# Patient Record
Sex: FEMALE | Race: BLACK OR AFRICAN AMERICAN
[De-identification: names, ages, dates, MRNs, and addresses within clinical notes are randomized per-mention and may not be internally consistent; named-entity substitution may affect disease eponyms.]

---

## 2019-08-16 ENCOUNTER — HOSPITAL ENCOUNTER (OUTPATIENT)
Dept: HOSPITAL 74 - JER | Age: 69
Discharge: HOME | End: 2019-08-16
Payer: SELF-PAY

## 2023-08-12 ENCOUNTER — EMERGENCY (EMERGENCY)
Facility: HOSPITAL | Age: 73
LOS: 0 days | Discharge: HOME | End: 2023-08-12
Payer: MEDICAID

## 2023-08-12 VITALS
HEART RATE: 80 BPM | HEIGHT: 65 IN | OXYGEN SATURATION: 96 % | WEIGHT: 154.98 LBS | TEMPERATURE: 99 F | RESPIRATION RATE: 16 BRPM | SYSTOLIC BLOOD PRESSURE: 133 MMHG | DIASTOLIC BLOOD PRESSURE: 79 MMHG

## 2023-08-12 DIAGNOSIS — Z99.2 DEPENDENCE ON RENAL DIALYSIS: ICD-10-CM

## 2023-08-12 DIAGNOSIS — Z95.5 PRESENCE OF CORONARY ANGIOPLASTY IMPLANT AND GRAFT: Chronic | ICD-10-CM

## 2023-08-12 DIAGNOSIS — I12.0 HYPERTENSIVE CHRONIC KIDNEY DISEASE WITH STAGE 5 CHRONIC KIDNEY DISEASE OR END STAGE RENAL DISEASE: ICD-10-CM

## 2023-08-12 DIAGNOSIS — E11.22 TYPE 2 DIABETES MELLITUS WITH DIABETIC CHRONIC KIDNEY DISEASE: ICD-10-CM

## 2023-08-12 DIAGNOSIS — L97.919 NON-PRESSURE CHRONIC ULCER OF UNSPECIFIED PART OF RIGHT LOWER LEG WITH UNSPECIFIED SEVERITY: ICD-10-CM

## 2023-08-12 DIAGNOSIS — M79.661 PAIN IN RIGHT LOWER LEG: ICD-10-CM

## 2023-08-12 DIAGNOSIS — N18.6 END STAGE RENAL DISEASE: ICD-10-CM

## 2023-08-12 DIAGNOSIS — Z86.79 PERSONAL HISTORY OF OTHER DISEASES OF THE CIRCULATORY SYSTEM: ICD-10-CM

## 2023-08-12 DIAGNOSIS — Z95.5 PRESENCE OF CORONARY ANGIOPLASTY IMPLANT AND GRAFT: ICD-10-CM

## 2023-08-12 PROCEDURE — 73590 X-RAY EXAM OF LOWER LEG: CPT | Mod: 26,RT

## 2023-08-12 PROCEDURE — 99284 EMERGENCY DEPT VISIT MOD MDM: CPT

## 2023-08-12 RX ORDER — ACETAMINOPHEN 500 MG
2 TABLET ORAL
Qty: 32 | Refills: 0
Start: 2023-08-12 | End: 2023-08-15

## 2023-08-12 RX ORDER — CEPHALEXIN 500 MG
500 CAPSULE ORAL ONCE
Refills: 0 | Status: COMPLETED | OUTPATIENT
Start: 2023-08-12 | End: 2023-08-12

## 2023-08-12 RX ORDER — TRAMADOL HYDROCHLORIDE 50 MG/1
50 TABLET ORAL ONCE
Refills: 0 | Status: DISCONTINUED | OUTPATIENT
Start: 2023-08-12 | End: 2023-08-12

## 2023-08-12 RX ORDER — CEPHALEXIN 500 MG
1 CAPSULE ORAL
Qty: 7 | Refills: 0
Start: 2023-08-12 | End: 2023-08-18

## 2023-08-12 RX ADMIN — TRAMADOL HYDROCHLORIDE 50 MILLIGRAM(S): 50 TABLET ORAL at 13:44

## 2023-08-12 RX ADMIN — Medication 500 MILLIGRAM(S): at 15:43

## 2023-08-12 NOTE — ED PROVIDER NOTE - NSICDXPASTMEDICALHX_GEN_ALL_CORE_FT
PAST MEDICAL HISTORY:  CAD (coronary artery disease)     DM (diabetes mellitus)     ESRD on dialysis     Hypertension

## 2023-08-12 NOTE — ED PROVIDER NOTE - CLINICAL SUMMARY MEDICAL DECISION MAKING FREE TEXT BOX
74 y/o female with ESRD MWF, dm, htn cad with stent here with right leg injury sustained 1 week ago with superficial ulcer.   Will obtain xray r/o fracture. Pt up to date on tetanus. NO signs of cellulitis Vs stable.   I have reviewed the xray and no fracture or dislocation.   Will cover with keflex.  Rx keflex sent to pharmacy.   Advised to keep wound clean and dry and follow up with PMD/ wound care. 72 y/o female with ESRD MWF, dm, htn cad with stent here with right leg injury sustained 1 week ago with superficial ulcer.   Will obtain xray r/o fracture. Pt up to date on tetanus. NO signs of cellulitis Vs stable.   I have reviewed the xray and no fracture or dislocation. Superficial ulcer to the right shin dressed with gauze and bacitracin.   Will cover with keflex.  Rx keflex sent to pharmacy.   Advised to keep wound clean and dry and follow up with PMD/ wound care.

## 2023-08-12 NOTE — ED ADULT TRIAGE NOTE - CHIEF COMPLAINT QUOTE
BIBEMS c/o right chin pain and getting worse started last week when hit chin on bed frame. hx dm   sore to right chin

## 2023-08-12 NOTE — ED PROVIDER NOTE - OBJECTIVE STATEMENT
74 y/o female with ESRD MWF, DM, htn, cad with stent here with right lower pain s/p injury. Pt states she bumped her right lower leg last week and has been having pain to the area. Denies fever, bleeding. Pt states she normally ambulates with a  walker. Denies numbness, tingling. Last dialysis yesterday. Pt took tylenol with no relief. Pt concerned for possible infection

## 2023-08-12 NOTE — ED PROVIDER NOTE - PATIENT PORTAL LINK FT
You can access the FollowMyHealth Patient Portal offered by [facility] by registering at the following website: [email or web address]. By joining [employer]’s FollowMyHealth portal, you will also be able to view your health information using other applications (apps) compatible with our system.

## 2023-08-12 NOTE — ED ADULT NURSE NOTE - OBJECTIVE STATEMENT
Patient is alert and oriented x4. Came in for wound on right shin. As per daughter, patient hit right shin 1 week ago. Patient is alert and oriented x4. Came in for wound on right shin. As per daughter, patient hit right shin 1 week ago on a bed. Denies any falls. Approximately 1 cm wound noted on right shin. No bleeding noted. Daughter reports patient has been having difficulty ambulating since then. PMH DM.

## 2023-08-12 NOTE — ED PROVIDER NOTE - NSFOLLOWUPINSTRUCTIONS_ED_ALL_ED_FT
Rest, drink plenty of fluids.  Advance activity as tolerated.  Continue all previously prescribed medications as directed.  Follow up with your primary care physician in 48-72 hours- bring copies of your results.  Return to the ER for worsening or persistent symptoms, and/or ANY NEW OR CONCERNING SYMPTOMS. If you have issues obtaining follow up, please call: [phone] to obtain a doctor or specialist who takes your insurance in your area.  You may call [phone] to make an appointment with the internal medicine clinic.

## 2023-08-12 NOTE — ED PROVIDER NOTE - PHYSICAL EXAMINATION
GEN: Awake, alert, interactive, NAD.  HEAD AND NECK: NC/AT. Airway patent. Neck supple.   EYES:  Clear b/l.   ENT: Moist mucus membranes.   CARDIAC: Regular rate, regular rhythm. No evident pedal edema.    RESP/CHEST: Normal respiratory effort with no use of accessory muscles or retractions. Clear throughout on auscultation.  ABD: soft, non-distended, non-tender. No rebound, no guarding.   BACK: No midline spinal TTP. No CVAT.   EXTREMITIES: RLE: (+) 1.5cm circular superficial ulcer to the right lower shin, (-) edema, (-) erythema, (-) drainage, (+) FROM of the knee, ankle and toes,  (+) pulses intact by doppler  SKIN: Warm, dry, intact normal color. No rash.   NEURO: AOx3, no focal deficits.   PSYCH: Appropriate mood and affect.

## 2023-08-12 NOTE — ED ADULT NURSE NOTE - NSFALLHARMRISKINTERV_ED_ALL_ED

## 2023-08-12 NOTE — ED PROVIDER NOTE - NSFOLLOWUPCLINICS_GEN_ALL_ED_FT
[facility]  Wound Care  [address] [place] [address]  Phone: [phone]  Fax: [phone]  Follow Up Time: 4-6 Days

## 2023-08-20 ENCOUNTER — INPATIENT (INPATIENT)
Facility: HOSPITAL | Age: 73
LOS: 4 days | Discharge: HOME | End: 2023-08-25
Attending: INTERNAL MEDICINE | Admitting: INTERNAL MEDICINE
Payer: MEDICAID

## 2023-08-20 VITALS
DIASTOLIC BLOOD PRESSURE: 70 MMHG | HEART RATE: 89 BPM | OXYGEN SATURATION: 98 % | SYSTOLIC BLOOD PRESSURE: 174 MMHG | HEIGHT: 65 IN | WEIGHT: 164.91 LBS

## 2023-08-20 DIAGNOSIS — Z95.5 PRESENCE OF CORONARY ANGIOPLASTY IMPLANT AND GRAFT: Chronic | ICD-10-CM

## 2023-08-20 LAB
ALBUMIN SERPL ELPH-MCNC: 3 G/DL — LOW (ref 3.3–5)
ALP SERPL-CCNC: 176 U/L — HIGH (ref 40–120)
ALT FLD-CCNC: 58 U/L — SIGNIFICANT CHANGE UP (ref 12–78)
ANION GAP SERPL CALC-SCNC: 12 MMOL/L — SIGNIFICANT CHANGE UP (ref 5–17)
AST SERPL-CCNC: 53 U/L — HIGH (ref 15–37)
BASOPHILS # BLD AUTO: 0.04 K/UL — SIGNIFICANT CHANGE UP (ref 0–0.2)
BASOPHILS NFR BLD AUTO: 0.7 % — SIGNIFICANT CHANGE UP (ref 0–2)
BILIRUB SERPL-MCNC: 0.3 MG/DL — SIGNIFICANT CHANGE UP (ref 0.2–1.2)
BUN SERPL-MCNC: 57 MG/DL — HIGH (ref 7–23)
CALCIUM SERPL-MCNC: 8.5 MG/DL — SIGNIFICANT CHANGE UP (ref 8.5–10.1)
CHLORIDE SERPL-SCNC: 101 MMOL/L — SIGNIFICANT CHANGE UP (ref 96–108)
CO2 SERPL-SCNC: 25 MMOL/L — SIGNIFICANT CHANGE UP (ref 22–31)
CREAT SERPL-MCNC: 11.6 MG/DL — HIGH (ref 0.5–1.3)
EGFR: 3 ML/MIN/1.73M2 — LOW
EOSINOPHIL # BLD AUTO: 0.08 K/UL — SIGNIFICANT CHANGE UP (ref 0–0.5)
EOSINOPHIL NFR BLD AUTO: 1.4 % — SIGNIFICANT CHANGE UP (ref 0–6)
GLUCOSE SERPL-MCNC: 135 MG/DL — HIGH (ref 70–99)
HCT VFR BLD CALC: 32.1 % — LOW (ref 34.5–45)
HGB BLD-MCNC: 9.8 G/DL — LOW (ref 11.5–15.5)
IMM GRANULOCYTES NFR BLD AUTO: 0.5 % — SIGNIFICANT CHANGE UP (ref 0–0.9)
LIDOCAIN IGE QN: 90 U/L — SIGNIFICANT CHANGE UP (ref 73–393)
LYMPHOCYTES # BLD AUTO: 1.16 K/UL — SIGNIFICANT CHANGE UP (ref 1–3.3)
LYMPHOCYTES # BLD AUTO: 19.7 % — SIGNIFICANT CHANGE UP (ref 13–44)
MCHC RBC-ENTMCNC: 28.8 PG — SIGNIFICANT CHANGE UP (ref 27–34)
MCHC RBC-ENTMCNC: 30.5 G/DL — LOW (ref 32–36)
MCV RBC AUTO: 94.4 FL — SIGNIFICANT CHANGE UP (ref 80–100)
MONOCYTES # BLD AUTO: 0.41 K/UL — SIGNIFICANT CHANGE UP (ref 0–0.9)
MONOCYTES NFR BLD AUTO: 7 % — SIGNIFICANT CHANGE UP (ref 2–14)
NEUTROPHILS # BLD AUTO: 4.16 K/UL — SIGNIFICANT CHANGE UP (ref 1.8–7.4)
NEUTROPHILS NFR BLD AUTO: 70.7 % — SIGNIFICANT CHANGE UP (ref 43–77)
NRBC # BLD: 0 /100 WBCS — SIGNIFICANT CHANGE UP (ref 0–0)
PLATELET # BLD AUTO: 217 K/UL — SIGNIFICANT CHANGE UP (ref 150–400)
POTASSIUM SERPL-MCNC: 4.3 MMOL/L — SIGNIFICANT CHANGE UP (ref 3.5–5.3)
POTASSIUM SERPL-SCNC: 4.3 MMOL/L — SIGNIFICANT CHANGE UP (ref 3.5–5.3)
PROT SERPL-MCNC: 6.8 GM/DL — SIGNIFICANT CHANGE UP (ref 6–8.3)
RBC # BLD: 3.4 M/UL — LOW (ref 3.8–5.2)
RBC # FLD: 13.6 % — SIGNIFICANT CHANGE UP (ref 10.3–14.5)
SODIUM SERPL-SCNC: 138 MMOL/L — SIGNIFICANT CHANGE UP (ref 135–145)
TROPONIN I, HIGH SENSITIVITY RESULT: 201.8 NG/L — HIGH
WBC # BLD: 5.88 K/UL — SIGNIFICANT CHANGE UP (ref 3.8–10.5)
WBC # FLD AUTO: 5.88 K/UL — SIGNIFICANT CHANGE UP (ref 3.8–10.5)

## 2023-08-20 PROCEDURE — 74177 CT ABD & PELVIS W/CONTRAST: CPT | Mod: 26,MA

## 2023-08-20 PROCEDURE — 99285 EMERGENCY DEPT VISIT HI MDM: CPT

## 2023-08-20 RX ORDER — FAMOTIDINE 10 MG/ML
20 INJECTION INTRAVENOUS ONCE
Refills: 0 | Status: COMPLETED | OUTPATIENT
Start: 2023-08-20 | End: 2023-08-20

## 2023-08-20 RX ORDER — ONDANSETRON 8 MG/1
4 TABLET, FILM COATED ORAL ONCE
Refills: 0 | Status: COMPLETED | OUTPATIENT
Start: 2023-08-20 | End: 2023-08-20

## 2023-08-20 RX ORDER — HYDROMORPHONE HYDROCHLORIDE 2 MG/ML
0.2 INJECTION INTRAMUSCULAR; INTRAVENOUS; SUBCUTANEOUS ONCE
Refills: 0 | Status: DISCONTINUED | OUTPATIENT
Start: 2023-08-20 | End: 2023-08-20

## 2023-08-20 RX ADMIN — ONDANSETRON 4 MILLIGRAM(S): 8 TABLET, FILM COATED ORAL at 21:01

## 2023-08-20 RX ADMIN — HYDROMORPHONE HYDROCHLORIDE 0.2 MILLIGRAM(S): 2 INJECTION INTRAMUSCULAR; INTRAVENOUS; SUBCUTANEOUS at 21:28

## 2023-08-20 RX ADMIN — FAMOTIDINE 20 MILLIGRAM(S): 10 INJECTION INTRAVENOUS at 21:01

## 2023-08-20 NOTE — ED ADULT NURSE NOTE - OBJECTIVE STATEMENT
pt a&O x3 fistula l arm  dialys m/w/f. walks with faulkner. pt c/o abdominal pain, nausea and vomiting for 3 days. pt states she was seen at [facility] on Tuesday for similar symptoms told fibroids and to consult surgery here at [facility]. history of htn, dm and ESRD on dialysis.

## 2023-08-20 NOTE — ED ADULT NURSE NOTE - CHIEF COMPLAINT QUOTE
pt c/o abdominal pain, nausea and vomiting for 3 days. pt states she was seen at [facility] on Tuesday for similar symptoms. history of htn, dm and ESRD on dialysis.

## 2023-08-20 NOTE — ED ADULT TRIAGE NOTE - CHIEF COMPLAINT QUOTE
pt c/o abdominal pain, nausea and vomiting for 3 days. pt states she was seen at [facility] on Tuesday for similar symptoms. history of htn, dm and ESRD on dialysis.

## 2023-08-20 NOTE — ED ADULT NURSE NOTE - NSFALLHARMRISKINTERV_ED_ALL_ED

## 2023-08-21 DIAGNOSIS — N18.6 END STAGE RENAL DISEASE: ICD-10-CM

## 2023-08-21 DIAGNOSIS — R10.9 UNSPECIFIED ABDOMINAL PAIN: ICD-10-CM

## 2023-08-21 DIAGNOSIS — D64.9 ANEMIA, UNSPECIFIED: ICD-10-CM

## 2023-08-21 DIAGNOSIS — R77.8 OTHER SPECIFIED ABNORMALITIES OF PLASMA PROTEINS: ICD-10-CM

## 2023-08-21 PROBLEM — I10 ESSENTIAL (PRIMARY) HYPERTENSION: Chronic | Status: ACTIVE | Noted: 2023-08-12

## 2023-08-21 LAB
GLUCOSE BLDC GLUCOMTR-MCNC: 119 MG/DL — HIGH (ref 70–99)
GLUCOSE BLDC GLUCOMTR-MCNC: 186 MG/DL — HIGH (ref 70–99)
GLUCOSE BLDC GLUCOMTR-MCNC: 197 MG/DL — HIGH (ref 70–99)
TROPONIN I, HIGH SENSITIVITY RESULT: 217.7 NG/L — HIGH

## 2023-08-21 PROCEDURE — 99223 1ST HOSP IP/OBS HIGH 75: CPT

## 2023-08-21 RX ORDER — DEXTROSE 50 % IN WATER 50 %
12.5 SYRINGE (ML) INTRAVENOUS ONCE
Refills: 0 | Status: DISCONTINUED | OUTPATIENT
Start: 2023-08-21 | End: 2023-08-25

## 2023-08-21 RX ORDER — DEXTROSE 50 % IN WATER 50 %
25 SYRINGE (ML) INTRAVENOUS ONCE
Refills: 0 | Status: DISCONTINUED | OUTPATIENT
Start: 2023-08-21 | End: 2023-08-25

## 2023-08-21 RX ORDER — AMLODIPINE BESYLATE 2.5 MG/1
10 TABLET ORAL DAILY
Refills: 0 | Status: DISCONTINUED | OUTPATIENT
Start: 2023-08-21 | End: 2023-08-25

## 2023-08-21 RX ORDER — PIPERACILLIN AND TAZOBACTAM 4; .5 G/20ML; G/20ML
3.38 INJECTION, POWDER, LYOPHILIZED, FOR SOLUTION INTRAVENOUS EVERY 12 HOURS
Refills: 0 | Status: DISCONTINUED | OUTPATIENT
Start: 2023-08-21 | End: 2023-08-25

## 2023-08-21 RX ORDER — CIPROFLOXACIN LACTATE 400MG/40ML
VIAL (ML) INTRAVENOUS
Refills: 0 | Status: DISCONTINUED | OUTPATIENT
Start: 2023-08-21 | End: 2023-08-21

## 2023-08-21 RX ORDER — HYDRALAZINE HCL 50 MG
50 TABLET ORAL EVERY 12 HOURS
Refills: 0 | Status: DISCONTINUED | OUTPATIENT
Start: 2023-08-21 | End: 2023-08-25

## 2023-08-21 RX ORDER — PANTOPRAZOLE SODIUM 20 MG/1
40 TABLET, DELAYED RELEASE ORAL
Refills: 0 | Status: DISCONTINUED | OUTPATIENT
Start: 2023-08-21 | End: 2023-08-25

## 2023-08-21 RX ORDER — ACETAMINOPHEN 500 MG
650 TABLET ORAL EVERY 6 HOURS
Refills: 0 | Status: DISCONTINUED | OUTPATIENT
Start: 2023-08-21 | End: 2023-08-25

## 2023-08-21 RX ORDER — DEXTROSE 50 % IN WATER 50 %
15 SYRINGE (ML) INTRAVENOUS ONCE
Refills: 0 | Status: DISCONTINUED | OUTPATIENT
Start: 2023-08-21 | End: 2023-08-25

## 2023-08-21 RX ORDER — SODIUM CHLORIDE 9 MG/ML
1000 INJECTION, SOLUTION INTRAVENOUS
Refills: 0 | Status: DISCONTINUED | OUTPATIENT
Start: 2023-08-21 | End: 2023-08-25

## 2023-08-21 RX ORDER — CIPROFLOXACIN LACTATE 400MG/40ML
400 VIAL (ML) INTRAVENOUS ONCE
Refills: 0 | Status: COMPLETED | OUTPATIENT
Start: 2023-08-21 | End: 2023-08-21

## 2023-08-21 RX ORDER — HYDROMORPHONE HYDROCHLORIDE 2 MG/ML
0.2 INJECTION INTRAMUSCULAR; INTRAVENOUS; SUBCUTANEOUS ONCE
Refills: 0 | Status: DISCONTINUED | OUTPATIENT
Start: 2023-08-21 | End: 2023-08-21

## 2023-08-21 RX ORDER — ONDANSETRON 8 MG/1
4 TABLET, FILM COATED ORAL EVERY 8 HOURS
Refills: 0 | Status: DISCONTINUED | OUTPATIENT
Start: 2023-08-21 | End: 2023-08-25

## 2023-08-21 RX ORDER — INSULIN LISPRO 100/ML
VIAL (ML) SUBCUTANEOUS
Refills: 0 | Status: DISCONTINUED | OUTPATIENT
Start: 2023-08-21 | End: 2023-08-25

## 2023-08-21 RX ORDER — ASPIRIN/CALCIUM CARB/MAGNESIUM 324 MG
81 TABLET ORAL DAILY
Refills: 0 | Status: DISCONTINUED | OUTPATIENT
Start: 2023-08-21 | End: 2023-08-25

## 2023-08-21 RX ORDER — METRONIDAZOLE 500 MG
500 TABLET ORAL ONCE
Refills: 0 | Status: COMPLETED | OUTPATIENT
Start: 2023-08-21 | End: 2023-08-21

## 2023-08-21 RX ORDER — GLUCAGON INJECTION, SOLUTION 0.5 MG/.1ML
1 INJECTION, SOLUTION SUBCUTANEOUS ONCE
Refills: 0 | Status: DISCONTINUED | OUTPATIENT
Start: 2023-08-21 | End: 2023-08-25

## 2023-08-21 RX ORDER — LANOLIN ALCOHOL/MO/W.PET/CERES
3 CREAM (GRAM) TOPICAL AT BEDTIME
Refills: 0 | Status: DISCONTINUED | OUTPATIENT
Start: 2023-08-21 | End: 2023-08-25

## 2023-08-21 RX ORDER — INSULIN LISPRO 100/ML
VIAL (ML) SUBCUTANEOUS AT BEDTIME
Refills: 0 | Status: DISCONTINUED | OUTPATIENT
Start: 2023-08-21 | End: 2023-08-25

## 2023-08-21 RX ADMIN — Medication 50 MILLIGRAM(S): at 05:19

## 2023-08-21 RX ADMIN — Medication 650 MILLIGRAM(S): at 05:08

## 2023-08-21 RX ADMIN — HYDROMORPHONE HYDROCHLORIDE 0.2 MILLIGRAM(S): 2 INJECTION INTRAMUSCULAR; INTRAVENOUS; SUBCUTANEOUS at 01:18

## 2023-08-21 RX ADMIN — Medication 200 MILLIGRAM(S): at 01:36

## 2023-08-21 RX ADMIN — PIPERACILLIN AND TAZOBACTAM 25 GRAM(S): 4; .5 INJECTION, POWDER, LYOPHILIZED, FOR SOLUTION INTRAVENOUS at 18:32

## 2023-08-21 RX ADMIN — AMLODIPINE BESYLATE 10 MILLIGRAM(S): 2.5 TABLET ORAL at 05:20

## 2023-08-21 RX ADMIN — Medication 1: at 11:15

## 2023-08-21 RX ADMIN — Medication 650 MILLIGRAM(S): at 11:14

## 2023-08-21 RX ADMIN — PIPERACILLIN AND TAZOBACTAM 25 GRAM(S): 4; .5 INJECTION, POWDER, LYOPHILIZED, FOR SOLUTION INTRAVENOUS at 05:08

## 2023-08-21 RX ADMIN — Medication 81 MILLIGRAM(S): at 11:14

## 2023-08-21 RX ADMIN — Medication 500 MILLIGRAM(S): at 01:39

## 2023-08-21 RX ADMIN — Medication 650 MILLIGRAM(S): at 17:23

## 2023-08-21 RX ADMIN — HYDROMORPHONE HYDROCHLORIDE 0.2 MILLIGRAM(S): 2 INJECTION INTRAMUSCULAR; INTRAVENOUS; SUBCUTANEOUS at 00:48

## 2023-08-21 RX ADMIN — ONDANSETRON 4 MILLIGRAM(S): 8 TABLET, FILM COATED ORAL at 03:05

## 2023-08-21 RX ADMIN — Medication 50 MILLIGRAM(S): at 18:31

## 2023-08-21 RX ADMIN — PANTOPRAZOLE SODIUM 40 MILLIGRAM(S): 20 TABLET, DELAYED RELEASE ORAL at 05:26

## 2023-08-21 RX ADMIN — Medication 100 MILLIGRAM(S): at 00:39

## 2023-08-21 NOTE — PATIENT PROFILE ADULT - FUNCTIONAL ASSESSMENT - BASIC MOBILITY 6.
Class II - visualization of the soft palate, fauces, and uvula
 3-calculated by average/Not able to assess (calculate score using [facility] averaging method)

## 2023-08-21 NOTE — H&P ADULT - PROBLEM SELECTOR PLAN 1
1 week history of abdominal pain   Recently discharged from [facility] for the same symptom   CT- Abd: Edematous wall thickening of the large bowel, most consistent with colitis  - IVF   - Zosyn   - Pain management

## 2023-08-21 NOTE — H&P ADULT - HISTORY OF PRESENT ILLNESS
73 year old female with a PMH of ESRD on dialysis (MWF), HTN, DM  presents to the ED with 1 week history of abdominal pain, patient went to [facility] for the same symptoms and was discharged 3 days ago. Endorses she is unclear of what her diagnosis was, she was prescribed antibiotics, but the pain persisted associated with nausea and NBNB vomiting.  Denies diarrhea, constipation, prior abd surgery, fever, SOB, chest pain, dysuria or any other symptoms. CT- Abd: Edematous wall thickening of the large bowel, most consistent with colitis.

## 2023-08-21 NOTE — CONSULT NOTE ADULT - CONSULT REQUESTED DATE/TIME
In an effort to ensure that our patients [employer], a clinical Team Member has reviewed your chart and identified an opportunity to provide the best care possible. An Outreach Attempt was made to discuss or schedule overdue Preventative or Disease Management screening.     The Outcome of the Outreach was Contact was not made, letter/portal message sent. Care Gaps include Immunizations and Wellness Visits.      
21-Aug-2023 11:27

## 2023-08-21 NOTE — ED PROVIDER NOTE - PROGRESS NOTE DETAILS
Colitis on CT, will treat with IV antibiotics as patient has had to hobble tolerating p.o. at home, also required 2 rounds of Dilaudid for pain control.  Troponin elevated, however no prior troponin likely from ESRD.  Patient will receive dialysis tomorrow as she is scheduled and received contrast today

## 2023-08-21 NOTE — ED PROVIDER NOTE - PHYSICAL EXAMINATION
General: No acute distress, mentation at baseline,  well nourished, well developed  HEENT: NCAT, Neck supple without meningismus, PERRL, no conjunctival injection  Lungs: CTAB, No wheeze or crackles, No retractions, No increased work of breathing  Heart: S1S2 RRR, No M/R/G, Pules equal Bilaterally in upper and lower extremities distally  Abd: soft, lower abd tenderness, ND, No guarding, No rebound.  No hernias, no palpable masses.  Extrem: FROM in all joints, no gross deformities appreciated, no significant edema noted, No ulcers. Cap refil < 2sec.  Skin: No rash noted, warm dry.  Neuro:  Grossly normal.  No difficulty ambulating. No focal deficits.  Psychiatric: Appropriate mood and affect.

## 2023-08-21 NOTE — H&P ADULT - NSHPPHYSICALEXAM_GEN_ALL_CORE
General: No acute distress  HEENT: NCAT, Neck supple without meningismus, PERRL, no conjunctival injection  Lungs: CTAB, No wheeze or crackles, No retractions, No increased work of breathing  Heart: = RRR, No M/R/G, Pules equal Bilaterally in upper and lower extremities distally  Abd: +BS, soft, lower abd tenderness, No guarding, No rebound.  No hernias, no palpable masses.  EXT: Full ROM. no gross deformities appreciated, no significant edema noted  Skin: No rash noted, warm dry.  Neuro:  Grossly normal.  No difficulty ambulating. No focal deficits.  Psychiatric: Appropriate mood and affect.

## 2023-08-21 NOTE — CONSULT NOTE ADULT - SUBJECTIVE AND OBJECTIVE BOX
[facility] NEPHROLOGY SERVICES, [facility]  NEPHROLOGY AND HYPERTENSION  [address]  [address]  [place] [address]  [phone]    MD GINA GIBSON MD YELENA ROSENBERG, MD BINNY KOSHY, MD CHRISTOPHER CAPUTO, MD EDWARD BOVER, MD      Information from chart:  "Patient is a 73y old  Female who presents with a chief complaint of Abdominal Pain (21 Aug 2023 02:55)    HPI:  73 year old female with a PMH of ESRD on dialysis (MWF), HTN, DM  presents to the ED with 1 week history of abdominal pain, patient went to [facility] for the same symptoms and was discharged 3 days ago. Endorses she is unclear of what her diagnosis was, she was prescribed antibiotics, but the pain persisted associated with nausea and NBNB vomiting.  Denies diarrhea, constipation, prior abd surgery, fever, SOB, chest pain, dysuria or any other symptoms. CT- Abd: Edematous wall thickening of the large bowel, most consistent with colitis.   (21 Aug 2023 02:55)   "  No distress    HD at [facility]  Dr. Smith    PAST MEDICAL & SURGICAL HISTORY:  ESRD on dialysis      DM (diabetes mellitus)      Hypertension      CAD (coronary artery disease)      H/O heart artery stent        FAMILY HISTORY:  No pertinent family history in first degree relatives      Allergies    No Known Allergies    Intolerances      Home Medications:    MEDICATIONS  (STANDING):  amLODIPine   Tablet 10 milliGRAM(s) Oral daily  aspirin  chewable 81 milliGRAM(s) Oral daily  dextrose 5%. 1000 milliLiter(s) (50 mL/Hr) IV Continuous <Continuous>  dextrose 5%. 1000 milliLiter(s) (100 mL/Hr) IV Continuous <Continuous>  dextrose 50% Injectable 12.5 Gram(s) IV Push once  dextrose 50% Injectable 25 Gram(s) IV Push once  dextrose 50% Injectable 25 Gram(s) IV Push once  glucagon  Injectable 1 milliGRAM(s) IntraMuscular once  hydrALAZINE 50 milliGRAM(s) Oral every 12 hours  insulin lispro (ADMELOG) corrective regimen sliding scale   SubCutaneous at bedtime  insulin lispro (ADMELOG) corrective regimen sliding scale   SubCutaneous three times a day before meals  pantoprazole    Tablet 40 milliGRAM(s) Oral before breakfast  piperacillin/tazobactam IVPB.. 3.375 Gram(s) IV Intermittent every 12 hours    MEDICATIONS  (PRN):  acetaminophen     Tablet .. 650 milliGRAM(s) Oral every 6 hours PRN Temp greater or equal to 38C (100.4F), Mild Pain (1 - 3)  aluminum hydroxide/magnesium hydroxide/simethicone Suspension 30 milliLiter(s) Oral every 4 hours PRN Dyspepsia  dextrose Oral Gel 15 Gram(s) Oral once PRN Blood Glucose LESS THAN 70 milliGRAM(s)/deciliter  melatonin 3 milliGRAM(s) Oral at bedtime PRN Insomnia  ondansetron Injectable 4 milliGRAM(s) IV Push every 8 hours PRN Nausea and/or Vomiting    Vital Signs Last 24 Hrs  T(C): 36.7 (21 Aug 2023 08:27), Max: 36.8 (21 Aug 2023 04:13)  T(F): 98 (21 Aug 2023 08:27), Max: 98.2 (21 Aug 2023 04:13)  HR: 79 (21 Aug 2023 08:27) (77 - 102)  BP: 187/78 (21 Aug 2023 08:27) (161/64 - 195/76)  BP(mean): --  RR: 18 (21 Aug 2023 08:27) (18 - 18)  SpO2: 98% (21 Aug 2023 08:27) (96% - 100%)    Parameters below as of 21 Aug 2023 08:27  Patient On (Oxygen Delivery Method): room air        Daily Height in cm: 165.1 (20 Aug 2023 19:05)    Daily     CAPILLARY BLOOD GLUCOSE      POCT Blood Glucose.: 186 mg/dL (21 Aug 2023 11:01)  POCT Blood Glucose.: 119 mg/dL (21 Aug 2023 04:36)    PHYSICAL EXAM:      T(C): 36.7 (08-21-23 @ 08:27), Max: 36.8 (08-21-23 @ 04:13)  HR: 79 (08-21-23 @ 08:27) (77 - 102)  BP: 187/78 (08-21-23 @ 08:27) (161/64 - 195/76)  RR: 18 (08-21-23 @ 08:27) (18 - 18)  SpO2: 98% (08-21-23 @ 08:27) (96% - 100%)  Wt(kg): --  Lungs clear  Heart S1S2  Abd soft NT ND  Extremities:   tr edema              08-20    138  |  101  |  57<H>  ----------------------------<  135<H>  4.3   |  25  |  11.60<H>    Ca    8.5      20 Aug 2023 20:50    TPro  6.8  /  Alb  3.0<L>  /  TBili  0.3  /  DBili  x   /  AST  53<H>  /  ALT  58  /  AlkPhos  176<H>  08-20                          9.8    5.88  )-----------( 217      ( 20 Aug 2023 20:50 )             32.1     Creatinine Trend: 11.60<--  Urinalysis Basic - ( 20 Aug 2023 20:50 )    Color: x / Appearance: x / SG: x / pH: x  Gluc: 135 mg/dL / Ketone: x  / Bili: x / Urobili: x   Blood: x / Protein: x / Nitrite: x   Leuk Esterase: x / RBC: x / WBC x   Sq Epi: x / Non Sq Epi: x / Bacteria: x            Assessment   ESRD; colitis;   Incidental adrenal nodule; HTN    Plan  Maintenance HD for today  Jaylen: renin level    Nicko Jean MD

## 2023-08-21 NOTE — PATIENT PROFILE ADULT - FALL HARM RISK - RISK INTERVENTIONS

## 2023-08-21 NOTE — H&P ADULT - NSHPLABSRESULTS_GEN_ALL_CORE
9.8    5.88  )-----------( 217      ( 20 Aug 2023 20:50 )             32.1     08-20    138  |  101  |  57<H>  ----------------------------<  135<H>  4.3   |  25  |  11.60<H>    Ca    8.5      20 Aug 2023 20:50    TPro  6.8  /  Alb  3.0<L>  /  TBili  0.3  /  DBili  x   /  AST  53<H>  /  ALT  58  /  AlkPhos  176<H>  08-20      Urinalysis Basic - ( 20 Aug 2023 20:50 )    Color: x / Appearance: x / SG: x / pH: x  Gluc: 135 mg/dL / Ketone: x  / Bili: x / Urobili: x   Blood: x / Protein: x / Nitrite: x   Leuk Esterase: x / RBC: x / WBC x   Sq Epi: x / Non Sq Epi: x / Bacteria: x    CT-Abd  Edematous wall thickening of the large bowel, most consistent with   colitis.    Cholelithiasis and/or gallbladder sludge.    1.4 cm right adrenal gland nodule cannot be adequately characterized on   this exam.    6 mm right lower lobe pulmonary nodule. Follow-up CT chest in 6-12 months   can be obtained.

## 2023-08-21 NOTE — ED PROVIDER NOTE - CLINICAL SUMMARY MEDICAL DECISION MAKING FREE TEXT BOX
Differential diagnosis includes: colitis vs appy vs SBO. Abdominal exam without peritoneal signs. No evidence of acute abdomen at this time. Well appearing. Low suspicion for acute hepatobiliary disease (includng acute cholecystitis), acute pancreatitis, PUD (including perforation), acute infectious processes (pneumonia, hepatitis, pyelonephritis), vascular catastrophe, or viscus perforation. Presentation not consistent with other acute, emergent causes of abdominal pain at this time.    Plan: labs, UA, CT AP, pain control, serial reassessment

## 2023-08-21 NOTE — H&P ADULT - ASSESSMENT
73 year old female with a PMH of ESRD on dialysis (MWF), HTN, DM  presents to the ED with 1 week history of abdominal pain, patient went to [facility] for the same symptoms and was discharged 3 days ago. Endorses she is unclear of what her diagnosis was, she was prescribed antibiotics, but the pain persisted associated with nausea and NBNB vomiting.  Denies diarrhea, constipation, prior abd surgery, fever, SOB, chest pain, dysuria or any other symptoms. CT- Abd: Edematous wall thickening of the large bowel, most consistent with colitis.

## 2023-08-21 NOTE — ED PROVIDER NOTE - NS ED ROS FT
CONST: no fevers, no chills  EYES: no pain, no vision changes  ENT: no sore throat, no ear pain, no change in hearing  CV: no chest pain, no leg swelling  RESP: no shortness of breath, no cough  ABD: (+) abdominal pain, nausea, vomiting  : no dysuria, no flank pain, no hematuria  MSK: no back pain, no extremity pain  NEURO: no headache or additional neurologic complaints  HEME: no easy bleeding  SKIN:  no rash

## 2023-08-21 NOTE — ED PROVIDER NOTE - OBJECTIVE STATEMENT
73-year-old female past ministry of ESRD dialysis Monday Wednesday Friday, hypertension diabetes presenting with abdominal pain for 1 week.  Patient states she was discharged from [facility] 3 days ago for same complaint.  Does not remember diagnosis at that time.  States that she has had persistent pain despite outpatient medications, has vomited medication few times.  Denies diarrhea constipation or prior abdominal surgery.  Denies fever vaginal bleeding discharge or dysuria or hematuria

## 2023-08-22 LAB
A1C WITH ESTIMATED AVERAGE GLUCOSE RESULT: 7.8 % — HIGH (ref 4–5.6)
ALBUMIN SERPL ELPH-MCNC: 2.6 G/DL — LOW (ref 3.3–5)
ALDOST SERPL-MCNC: 30.7 NG/DL — HIGH
ALDOSTERONE / DIRECT RENIN RATIO RESULT: 6 RATIO — HIGH
ALP SERPL-CCNC: 156 U/L — HIGH (ref 40–120)
ALT FLD-CCNC: 68 U/L — SIGNIFICANT CHANGE UP (ref 12–78)
ANION GAP SERPL CALC-SCNC: 6 MMOL/L — SIGNIFICANT CHANGE UP (ref 5–17)
AST SERPL-CCNC: 47 U/L — HIGH (ref 15–37)
BILIRUB SERPL-MCNC: 0.2 MG/DL — SIGNIFICANT CHANGE UP (ref 0.2–1.2)
BUN SERPL-MCNC: 30 MG/DL — HIGH (ref 7–23)
CALCIUM SERPL-MCNC: 8.6 MG/DL — SIGNIFICANT CHANGE UP (ref 8.5–10.1)
CHLORIDE SERPL-SCNC: 104 MMOL/L — SIGNIFICANT CHANGE UP (ref 96–108)
CHOLEST SERPL-MCNC: 141 MG/DL — SIGNIFICANT CHANGE UP
CO2 SERPL-SCNC: 27 MMOL/L — SIGNIFICANT CHANGE UP (ref 22–31)
CREAT SERPL-MCNC: 7.7 MG/DL — HIGH (ref 0.5–1.3)
EGFR: 5 ML/MIN/1.73M2 — LOW
ESTIMATED AVERAGE GLUCOSE: 177 MG/DL — HIGH (ref 68–114)
GLUCOSE BLDC GLUCOMTR-MCNC: 111 MG/DL — HIGH (ref 70–99)
GLUCOSE BLDC GLUCOMTR-MCNC: 139 MG/DL — HIGH (ref 70–99)
GLUCOSE BLDC GLUCOMTR-MCNC: 175 MG/DL — HIGH (ref 70–99)
GLUCOSE BLDC GLUCOMTR-MCNC: 177 MG/DL — HIGH (ref 70–99)
GLUCOSE SERPL-MCNC: 104 MG/DL — HIGH (ref 70–99)
HCT VFR BLD CALC: 29.4 % — LOW (ref 34.5–45)
HCV AB S/CO SERPL IA: 0.06 S/CO — SIGNIFICANT CHANGE UP (ref 0–0.99)
HCV AB SERPL-IMP: SIGNIFICANT CHANGE UP
HDLC SERPL-MCNC: 48 MG/DL — LOW
HGB BLD-MCNC: 8.8 G/DL — LOW (ref 11.5–15.5)
LIPID PNL WITH DIRECT LDL SERPL: 77 MG/DL — SIGNIFICANT CHANGE UP
MCHC RBC-ENTMCNC: 28.4 PG — SIGNIFICANT CHANGE UP (ref 27–34)
MCHC RBC-ENTMCNC: 29.9 G/DL — LOW (ref 32–36)
MCV RBC AUTO: 94.8 FL — SIGNIFICANT CHANGE UP (ref 80–100)
NON HDL CHOLESTEROL: 94 MG/DL — SIGNIFICANT CHANGE UP
NRBC # BLD: 0 /100 WBCS — SIGNIFICANT CHANGE UP (ref 0–0)
PLATELET # BLD AUTO: 195 K/UL — SIGNIFICANT CHANGE UP (ref 150–400)
POTASSIUM SERPL-MCNC: 4.1 MMOL/L — SIGNIFICANT CHANGE UP (ref 3.5–5.3)
POTASSIUM SERPL-SCNC: 4.1 MMOL/L — SIGNIFICANT CHANGE UP (ref 3.5–5.3)
PROT SERPL-MCNC: 6 GM/DL — SIGNIFICANT CHANGE UP (ref 6–8.3)
RBC # BLD: 3.1 M/UL — LOW (ref 3.8–5.2)
RBC # FLD: 13.7 % — SIGNIFICANT CHANGE UP (ref 10.3–14.5)
RENIN DIRECT, PLASMA: 5.1 PG/ML — SIGNIFICANT CHANGE UP
SODIUM SERPL-SCNC: 137 MMOL/L — SIGNIFICANT CHANGE UP (ref 135–145)
TRIGL SERPL-MCNC: 84 MG/DL — SIGNIFICANT CHANGE UP
WBC # BLD: 4.91 K/UL — SIGNIFICANT CHANGE UP (ref 3.8–10.5)
WBC # FLD AUTO: 4.91 K/UL — SIGNIFICANT CHANGE UP (ref 3.8–10.5)

## 2023-08-22 PROCEDURE — 99233 SBSQ HOSP IP/OBS HIGH 50: CPT

## 2023-08-22 RX ADMIN — PIPERACILLIN AND TAZOBACTAM 25 GRAM(S): 4; .5 INJECTION, POWDER, LYOPHILIZED, FOR SOLUTION INTRAVENOUS at 17:57

## 2023-08-22 RX ADMIN — Medication 1: at 12:28

## 2023-08-22 RX ADMIN — PIPERACILLIN AND TAZOBACTAM 25 GRAM(S): 4; .5 INJECTION, POWDER, LYOPHILIZED, FOR SOLUTION INTRAVENOUS at 05:39

## 2023-08-22 RX ADMIN — PANTOPRAZOLE SODIUM 40 MILLIGRAM(S): 20 TABLET, DELAYED RELEASE ORAL at 12:30

## 2023-08-22 RX ADMIN — AMLODIPINE BESYLATE 10 MILLIGRAM(S): 2.5 TABLET ORAL at 05:39

## 2023-08-22 RX ADMIN — Medication 650 MILLIGRAM(S): at 20:58

## 2023-08-22 RX ADMIN — Medication 650 MILLIGRAM(S): at 21:58

## 2023-08-22 RX ADMIN — ONDANSETRON 4 MILLIGRAM(S): 8 TABLET, FILM COATED ORAL at 20:58

## 2023-08-22 RX ADMIN — Medication 50 MILLIGRAM(S): at 17:57

## 2023-08-22 RX ADMIN — Medication 50 MILLIGRAM(S): at 05:40

## 2023-08-22 RX ADMIN — Medication 81 MILLIGRAM(S): at 12:29

## 2023-08-23 ENCOUNTER — TRANSCRIPTION ENCOUNTER (OUTPATIENT)
Age: 73
End: 2023-08-23

## 2023-08-23 LAB
GLUCOSE BLDC GLUCOMTR-MCNC: 117 MG/DL — HIGH (ref 70–99)
GLUCOSE BLDC GLUCOMTR-MCNC: 161 MG/DL — HIGH (ref 70–99)
GLUCOSE BLDC GLUCOMTR-MCNC: 180 MG/DL — HIGH (ref 70–99)
GLUCOSE BLDC GLUCOMTR-MCNC: 287 MG/DL — HIGH (ref 70–99)
HCT VFR BLD CALC: 29.3 % — LOW (ref 34.5–45)
HGB BLD-MCNC: 8.8 G/DL — LOW (ref 11.5–15.5)
MCHC RBC-ENTMCNC: 28.5 PG — SIGNIFICANT CHANGE UP (ref 27–34)
MCHC RBC-ENTMCNC: 30 G/DL — LOW (ref 32–36)
MCV RBC AUTO: 94.8 FL — SIGNIFICANT CHANGE UP (ref 80–100)
NRBC # BLD: 0 /100 WBCS — SIGNIFICANT CHANGE UP (ref 0–0)
PLATELET # BLD AUTO: 208 K/UL — SIGNIFICANT CHANGE UP (ref 150–400)
RBC # BLD: 3.09 M/UL — LOW (ref 3.8–5.2)
RBC # FLD: 13.8 % — SIGNIFICANT CHANGE UP (ref 10.3–14.5)
WBC # BLD: 4.99 K/UL — SIGNIFICANT CHANGE UP (ref 3.8–10.5)
WBC # FLD AUTO: 4.99 K/UL — SIGNIFICANT CHANGE UP (ref 3.8–10.5)

## 2023-08-23 PROCEDURE — 99232 SBSQ HOSP IP/OBS MODERATE 35: CPT

## 2023-08-23 RX ORDER — INSULIN GLARGINE 100 [IU]/ML
10 INJECTION, SOLUTION SUBCUTANEOUS AT BEDTIME
Refills: 0 | Status: DISCONTINUED | OUTPATIENT
Start: 2023-08-23 | End: 2023-08-25

## 2023-08-23 RX ORDER — CIPROFLOXACIN LACTATE 400MG/40ML
1 VIAL (ML) INTRAVENOUS
Qty: 8 | Refills: 0
Start: 2023-08-23 | End: 2023-08-30

## 2023-08-23 RX ORDER — INSULIN DEGLUDEC 100 U/ML
10 INJECTION, SOLUTION SUBCUTANEOUS
Qty: 5 | Refills: 0
Start: 2023-08-23 | End: 2023-09-21

## 2023-08-23 RX ORDER — AMLODIPINE BESYLATE 2.5 MG/1
1 TABLET ORAL
Qty: 30 | Refills: 0
Start: 2023-08-23 | End: 2023-09-21

## 2023-08-23 RX ORDER — METRONIDAZOLE 500 MG
1 TABLET ORAL
Qty: 24 | Refills: 0
Start: 2023-08-23 | End: 2023-08-30

## 2023-08-23 RX ORDER — INSULIN LISPRO 100/ML
1 VIAL (ML) SUBCUTANEOUS
Refills: 0 | DISCHARGE

## 2023-08-23 RX ORDER — PANTOPRAZOLE SODIUM 20 MG/1
1 TABLET, DELAYED RELEASE ORAL
Refills: 0 | DISCHARGE

## 2023-08-23 RX ORDER — SODIUM ZIRCONIUM CYCLOSILICATE 10 G/10G
10 POWDER, FOR SUSPENSION ORAL
Refills: 0 | DISCHARGE

## 2023-08-23 RX ORDER — INSULIN DETEMIR 100/ML (3)
10 INSULIN PEN (ML) SUBCUTANEOUS
Qty: 5 | Refills: 0
Start: 2023-08-23 | End: 2023-09-21

## 2023-08-23 RX ORDER — PANTOPRAZOLE SODIUM 20 MG/1
1 TABLET, DELAYED RELEASE ORAL
Qty: 30 | Refills: 0
Start: 2023-08-23 | End: 2023-09-21

## 2023-08-23 RX ORDER — INSULIN ASPART 100 [IU]/ML
1 INJECTION, SOLUTION SUBCUTANEOUS
Qty: 5 | Refills: 0
Start: 2023-08-23 | End: 2023-09-21

## 2023-08-23 RX ORDER — HYDRALAZINE HCL 50 MG
1 TABLET ORAL
Qty: 60 | Refills: 0
Start: 2023-08-23 | End: 2023-09-21

## 2023-08-23 RX ORDER — ASPIRIN/CALCIUM CARB/MAGNESIUM 324 MG
1 TABLET ORAL
Qty: 30 | Refills: 0
Start: 2023-08-23 | End: 2023-09-21

## 2023-08-23 RX ORDER — INSULIN LISPRO 100/ML
1 VIAL (ML) SUBCUTANEOUS
Qty: 5 | Refills: 0
Start: 2023-08-23 | End: 2023-09-21

## 2023-08-23 RX ORDER — ATORVASTATIN CALCIUM 80 MG/1
1 TABLET, FILM COATED ORAL
Refills: 0 | DISCHARGE

## 2023-08-23 RX ORDER — INSULIN DETEMIR 100/ML (3)
10 INSULIN PEN (ML) SUBCUTANEOUS
Refills: 0 | DISCHARGE

## 2023-08-23 RX ADMIN — Medication 81 MILLIGRAM(S): at 11:17

## 2023-08-23 RX ADMIN — AMLODIPINE BESYLATE 10 MILLIGRAM(S): 2.5 TABLET ORAL at 06:40

## 2023-08-23 RX ADMIN — INSULIN GLARGINE 10 UNIT(S): 100 INJECTION, SOLUTION SUBCUTANEOUS at 22:09

## 2023-08-23 RX ADMIN — Medication 50 MILLIGRAM(S): at 17:37

## 2023-08-23 RX ADMIN — Medication 3 MILLIGRAM(S): at 01:23

## 2023-08-23 RX ADMIN — PIPERACILLIN AND TAZOBACTAM 25 GRAM(S): 4; .5 INJECTION, POWDER, LYOPHILIZED, FOR SOLUTION INTRAVENOUS at 17:37

## 2023-08-23 RX ADMIN — Medication 50 MILLIGRAM(S): at 06:40

## 2023-08-23 RX ADMIN — Medication 650 MILLIGRAM(S): at 06:59

## 2023-08-23 RX ADMIN — Medication 1: at 17:37

## 2023-08-23 RX ADMIN — PIPERACILLIN AND TAZOBACTAM 25 GRAM(S): 4; .5 INJECTION, POWDER, LYOPHILIZED, FOR SOLUTION INTRAVENOUS at 06:48

## 2023-08-23 RX ADMIN — Medication 3: at 11:21

## 2023-08-23 RX ADMIN — PANTOPRAZOLE SODIUM 40 MILLIGRAM(S): 20 TABLET, DELAYED RELEASE ORAL at 08:12

## 2023-08-23 NOTE — DISCHARGE NOTE PROVIDER - HOSPITAL COURSE
73 year old female with a PMH of ESRD on dialysis (MWF), HTN, DM  presents to the ED with 1 week history of abdominal pain, patient went to [facility] for the same symptoms and was discharged 3 days ago. Endorses she is unclear of what her diagnosis was, she was prescribed antibiotics, but the pain persisted associated with nausea and NBNB vomiting.  Denies diarrhea, constipation, prior abd surgery, fever, SOB, chest pain, dysuria or any other symptoms. CT- Abd: Edematous wall thickening of the large bowel, most consistent with colitis.    1. Acute abdominal pain:  - Unchanged from yesterday.  - Colitis on CT  - On zosyn day 2  - Afebrile  - C.diff negative    2. ESRD:  - On IHD MWF    3. DM-2:  - SSI    On 8/23/23 this case was reviewed with Dr. Espitia, the patient is medically stable and optimized for discharge as per attending. All medications were reviewed and prescriptions were sent to mutually agreed upon pharmacy. Discharge plan reviewed with patient and/or family.   73 year old female with a PMH of ESRD on dialysis (MWF), HTN, DM  presents to the ED with 1 week history of abdominal pain, patient went to [facility] for the same symptoms and was discharged 3 days ago. Endorses she is unclear of what her diagnosis was, she was prescribed antibiotics, but the pain persisted associated with nausea and NBNB vomiting.  Denies diarrhea, constipation, prior abd surgery, fever, SOB, chest pain, dysuria or any other symptoms. CT- Abd: Edematous wall thickening of the large bowel, most consistent with colitis.    1. Acute abdominal pain:  - Colitis on CT, - C.diff negative  - On zosyn day 2, transition to cipro 250 mg q24 hrs and flagyl 500 mg TID for remainder of 10 day course on discharge    2. ESRD:  continue HD MWF    3. DM-2: A1C 7.2%, continue home dose of lantus and humalog, follow up with [facility] as scheduled on 9/7/23.       On 8/23/23 this case was reviewed with Dr. Espitia, the patient is medically stable and optimized for discharge as per attending. All medications were reviewed and prescriptions were sent to mutually agreed upon pharmacy. Discharge plan reviewed with patient and/or family.   73 year old female with a PMH of ESRD on dialysis (MWF), HTN, DM  presents to the ED with 1 week history of abdominal pain, patient went to [facility] for the same symptoms and was discharged 3 days ago. Endorses she is unclear of what her diagnosis was, she was prescribed antibiotics, but the pain persisted associated with nausea and NBNB vomiting.  Denies diarrhea, constipation, prior abd surgery, fever, SOB, chest pain, dysuria or any other symptoms. CT- Abd: Edematous wall thickening of the large bowel, most consistent with colitis.    1. Acute abdominal pain:  - Colitis on CT, - C.diff negative  - On zosyn day 2, transition to Cipro 250 mg q24 hrs and flagyl 500 mg TID for remainder of 10 day course on discharge    2. ESRD:  continue HD MWF    3. DM-2: A1C 7.2%, continue home dose of Lantus and Humalog, follow up with [facility] as scheduled on 9/7/23.     On 8/23/23 this case was reviewed with Dr. Espitia, the patient is medically stable and optimized for discharge as per attending. All medications were reviewed and prescriptions were sent to mutually agreed upon pharmacy. Discharge plan reviewed with patient and/or family.

## 2023-08-23 NOTE — DISCHARGE NOTE NURSING/CASE MANAGEMENT/SOCIAL WORK - NSDCPEFALRISK_GEN_ALL_CORE
For information on Fall & Injury Prevention, visit: [email or web address] OR  [email or web address] OR  https://www.cdc.gov/steadi/patient.html

## 2023-08-23 NOTE — DISCHARGE NOTE NURSING/CASE MANAGEMENT/SOCIAL WORK - PATIENT PORTAL LINK FT
You can access the FollowMyHealth Patient Portal offered by [facility] by registering at the following website: [email or web address]. By joining [employer]’s FollowMyHealth portal, you will also be able to view your health information using other applications (apps) compatible with our system.

## 2023-08-23 NOTE — DISCHARGE NOTE PROVIDER - NSDCCPCAREPLAN_GEN_ALL_CORE_FT
PRINCIPAL DISCHARGE DIAGNOSIS  Diagnosis: Colitis  Assessment and Plan of Treatment: Colitis means inflammation in your colon, where digested food becomes poop. Inflammation in your colon can make your poop more urgent, painful, runny or bloody. You can get temporary colitis from an infection.  Your provider has prescribed antibiotics to treat your colitis. Take the entire prescribed course of medication (8 days).         SECONDARY DISCHARGE DIAGNOSES  Diagnosis: Hypertensive kidney disease with ESRD on dialysis  Assessment and Plan of Treatment: .Continue blood pressure medication regimen as directed. Monitor for any visual changes, headaches or dizziness.  Monitor blood pressure regularly.  Follow up with your primary care provider for further management for high blood pressure.      Diagnosis: Anemia  Assessment and Plan of Treatment: Follow-up with your outpatient provider for further monitoring of your blood counts.   Monitor for signs/symptoms indicating worsening of disease, such as, easy bleeding/bruising, pale skin, fatigue, dizziness, increased heart rate, or chest pain.      Diagnosis: ESRD on dialysis  Assessment and Plan of Treatment: ,Please continue to follow your dialysis schedule and refer to your primary provider/nephrologist for further care and monitoring of kidney function and electrolytes. Continue a renal restricted diet (Avoiding foods high in potassium and phosphorus), your prescribed medications, and supplementations as directed.      Diagnosis: Type II diabetes mellitus with end-stage renal disease  Assessment and Plan of Treatment: .Your HgA1C during hospitalization was noted to be 7.2%  (Provide such information to your primary care).  Continue your medication regimen and a consistent carbohydrate diet (Meaning eating the same amount of carbohydrates at the same time each day). Monitor blood glucose levels throughout the day before meals and at bedtime. Record blood sugars and bring to outpatient providers appointment in order to be reviewed by your doctor for management modifications. If your sugars are more than 400 or less than 70 you should contact your PCP immediately.   Monitor for signs/symptoms of low blood glucose, such as, dizziness, altered mental status, or cool/clammy skin. In addition, monitor for signs/symptoms of high blood glucose, such as, feeling hot, dry, fatigued, or with increased thirst/urination.   Make regular podiatry appointments in order to have feet checked for wounds and uncontrolled toe nail growth to prevent infections, as well as, appointments with an ophthalmologist to monitor your vision.  continue home dose of lantus and humalog, follow up with [facility] as scheduled on 9/7/23.   ***KEEP INSULIN REFRIDGERATED***      Diagnosis: HLD (hyperlipidemia)  Assessment and Plan of Treatment: .Continue prescribed medications to control your cholesterol levels and a DASH (Low fat/salt) diet. Follow up with your primary care provider upon discharge for further management and monitoring of cholesterol levels.      Diagnosis: CAD (coronary artery disease)  Assessment and Plan of Treatment: .Please continue your statin medication to control cholesterol levels, as well as, Aspirin as prescribed in order to optimize your heart health.   Follow-up with your primary provider/cardiologist as outpatient for ongoing care. If you have persistent chest pain, shortness of breath, heart palpitations, or dizziness you should return to the emergency room.

## 2023-08-23 NOTE — DISCHARGE NOTE PROVIDER - CARE PROVIDER_API CALL
[facility]  Phone: (   )    -  Fax: (   )    -  Established Patient  Scheduled Appointment: 09/07/2023

## 2023-08-23 NOTE — DISCHARGE NOTE PROVIDER - PROVIDER TOKENS
FREE:[LAST:[[facility]],FIRST:[Medical Clinic],PHONE:[(   )    -],FAX:[(   )    -],SCHEDULEDAPPT:[09/07/2023],ESTABLISHEDPATIENT:[T]]

## 2023-08-23 NOTE — DISCHARGE NOTE PROVIDER - NSDCMRMEDTOKEN_GEN_ALL_CORE_FT
amLODIPine 10 mg oral tablet: 1 tab(s) orally once a day  aspirin 81 mg oral tablet, chewable: 1 tab(s) orally once a day  cephalexin 500 mg oral tablet: 1 tab(s) orally once a day  hydrALAZINE 50 mg oral tablet: 1 tab(s) orally every 12 hours  metroNIDAZOLE 500 mg oral tablet: 1 tab(s) orally 3 times a day  pantoprazole 40 mg oral delayed release tablet: 1 tab(s) orally once a day (before a meal)  Tylenol 325 mg oral capsule: 2 cap(s) orally every 6 hours   allopurinol 100 mg oral tablet: 1 tab(s) orally once a day  amLODIPine 10 mg oral tablet: 1 tab(s) orally once a day  aspirin 81 mg oral tablet, chewable: 1 tab(s) orally once a day  atorvastatin 80 mg oral tablet: 1 tab(s) orally once a day (at bedtime)  Cipro 250 mg oral tablet: 1 tab(s) orally once a day  gabapentin 100 mg oral capsule: 1 cap(s) orally 3 times a day  HumaLOG KwikPen 100 units/mL injectable solution: 1 unit(s) subcutaneously 3 times a day (with meals)  hydrALAZINE 50 mg oral tablet: 1 tab(s) orally every 12 hours  Levemir FlexTouch 100 units/mL subcutaneous solution: 10 unit(s) subcutaneous once a day (at bedtime)  metroNIDAZOLE 500 mg oral tablet: 1 tab(s) orally 3 times a day  pantoprazole 40 mg oral delayed release tablet: 1 tab(s) orally once a day (before a meal)  Renvela 800 mg oral tablet: 1 tab(s) orally 3 times a day  semaglutide 0.25 mg/0.5 mL (0.25 mg dose) subcutaneous solution: 0.25 milligram(s) subcutaneously every 7 days  sensipar: 60 mg po daily  Tylenol 325 mg oral capsule: 2 cap(s) orally every 6 hours   allopurinol 100 mg oral tablet: 1 tab(s) orally once a day  amLODIPine 10 mg oral tablet: 1 tab(s) orally once a day  aspirin 81 mg oral tablet, chewable: 1 tab(s) orally once a day  atorvastatin 80 mg oral tablet: 1 tab(s) orally once a day (at bedtime)  Cipro 250 mg oral tablet: 1 tab(s) orally once a day  gabapentin 100 mg oral capsule: 1 cap(s) orally 3 times a day  hydrALAZINE 50 mg oral tablet: 1 tab(s) orally every 12 hours  Insulin Pen Needles, 4mm: 1 application subcutaneously 4 times a day. ** Use with insulin pen **  metroNIDAZOLE 500 mg oral tablet: 1 tab(s) orally 3 times a day  NovoLOG FlexPen 100 units/mL injectable solution: 1 unit(s) injectable 3 times a day (with meals)  pantoprazole 40 mg oral delayed release tablet: 1 tab(s) orally once a day (before a meal)  Renvela 800 mg oral tablet: 1 tab(s) orally 3 times a day  semaglutide 0.25 mg/0.5 mL (0.25 mg dose) subcutaneous solution: 0.25 milligram(s) subcutaneously every 7 days  sensipar: 60 mg po daily  Tylenol 325 mg oral capsule: 2 cap(s) orally every 6 hours

## 2023-08-23 NOTE — CHART NOTE - NSCHARTNOTEFT_GEN_A_CORE
To Whom It May Concern,     Ms. Marina Reyes requires a refrigerator in her room to store her diabetic medications.   Please ensure that she has access to this medically necessary device.     If there are any additional questions or concerns please feel free to call.       GERARD Ruiz NP-BC  [phone]  On behalf of Dr. Espitia

## 2023-08-24 LAB
C DIFF BY PCR RESULT: SIGNIFICANT CHANGE UP
GLUCOSE BLDC GLUCOMTR-MCNC: 118 MG/DL — HIGH (ref 70–99)
GLUCOSE BLDC GLUCOMTR-MCNC: 176 MG/DL — HIGH (ref 70–99)
GLUCOSE BLDC GLUCOMTR-MCNC: 203 MG/DL — HIGH (ref 70–99)
GLUCOSE BLDC GLUCOMTR-MCNC: 214 MG/DL — HIGH (ref 70–99)

## 2023-08-24 PROCEDURE — 99232 SBSQ HOSP IP/OBS MODERATE 35: CPT

## 2023-08-24 RX ORDER — INSULIN ASPART 100 [IU]/ML
1 INJECTION, SOLUTION SUBCUTANEOUS
Qty: 5 | Refills: 0
Start: 2023-08-24 | End: 2023-09-22

## 2023-08-24 RX ADMIN — PANTOPRAZOLE SODIUM 40 MILLIGRAM(S): 20 TABLET, DELAYED RELEASE ORAL at 08:07

## 2023-08-24 RX ADMIN — ONDANSETRON 4 MILLIGRAM(S): 8 TABLET, FILM COATED ORAL at 10:17

## 2023-08-24 RX ADMIN — Medication 50 MILLIGRAM(S): at 06:22

## 2023-08-24 RX ADMIN — PIPERACILLIN AND TAZOBACTAM 25 GRAM(S): 4; .5 INJECTION, POWDER, LYOPHILIZED, FOR SOLUTION INTRAVENOUS at 17:17

## 2023-08-24 RX ADMIN — INSULIN GLARGINE 10 UNIT(S): 100 INJECTION, SOLUTION SUBCUTANEOUS at 21:44

## 2023-08-24 RX ADMIN — Medication 1: at 16:53

## 2023-08-24 RX ADMIN — PIPERACILLIN AND TAZOBACTAM 25 GRAM(S): 4; .5 INJECTION, POWDER, LYOPHILIZED, FOR SOLUTION INTRAVENOUS at 06:22

## 2023-08-24 RX ADMIN — Medication 2: at 11:34

## 2023-08-24 RX ADMIN — Medication 650 MILLIGRAM(S): at 08:15

## 2023-08-24 RX ADMIN — Medication 50 MILLIGRAM(S): at 17:17

## 2023-08-24 RX ADMIN — Medication 81 MILLIGRAM(S): at 11:34

## 2023-08-24 RX ADMIN — AMLODIPINE BESYLATE 10 MILLIGRAM(S): 2.5 TABLET ORAL at 06:22

## 2023-08-24 RX ADMIN — Medication 3 MILLIGRAM(S): at 00:27

## 2023-08-24 RX ADMIN — Medication 650 MILLIGRAM(S): at 09:15

## 2023-08-24 NOTE — PROGRESS NOTE ADULT - ASSESSMENT
A/P    1. Acute abdominal pain:  - Improved.  - Colitis on CT  - On zosyn day 3  - Afebrile  - Checking C.diff    2. ESRD:  - On IHD MWF    3. DM-2:  - SSI    Discharge ready - working on social issues.       Ryan Espitia MD    
A/P    1. Acute abdominal pain:  - Unchanged from yesterday.  - Colitis on CT  - On zosyn day 2  - Afebrile  - Checking C.diff    2. ESRD:  - On IHD MWF    3. DM-2:  - SSI      Ryan Espitia MD
A/P    1. Acute abdominal pain:  - Improved.  - Colitis on CT  - On zosyn day 4  - Afebrile  - Checking C.diff    2. ESRD:  - On IHD MWF    3. DM-2:  - SSI    Discharge tomorrow to shelter.       Ryan Espitia MD

## 2023-08-25 VITALS
OXYGEN SATURATION: 97 % | SYSTOLIC BLOOD PRESSURE: 152 MMHG | RESPIRATION RATE: 18 BRPM | HEART RATE: 117 BPM | DIASTOLIC BLOOD PRESSURE: 78 MMHG | TEMPERATURE: 97 F

## 2023-08-25 LAB
GLUCOSE BLDC GLUCOMTR-MCNC: 106 MG/DL — HIGH (ref 70–99)
GLUCOSE BLDC GLUCOMTR-MCNC: 142 MG/DL — HIGH (ref 70–99)
GLUCOSE BLDC GLUCOMTR-MCNC: 230 MG/DL — HIGH (ref 70–99)

## 2023-08-25 PROCEDURE — 99239 HOSP IP/OBS DSCHRG MGMT >30: CPT

## 2023-08-25 PROCEDURE — 93010 ELECTROCARDIOGRAM REPORT: CPT

## 2023-08-25 RX ADMIN — Medication 30 MILLILITER(S): at 12:18

## 2023-08-25 RX ADMIN — PANTOPRAZOLE SODIUM 40 MILLIGRAM(S): 20 TABLET, DELAYED RELEASE ORAL at 07:47

## 2023-08-25 RX ADMIN — Medication 650 MILLIGRAM(S): at 03:08

## 2023-08-25 RX ADMIN — Medication 3 MILLIGRAM(S): at 03:09

## 2023-08-25 RX ADMIN — Medication 2: at 16:12

## 2023-08-25 RX ADMIN — AMLODIPINE BESYLATE 10 MILLIGRAM(S): 2.5 TABLET ORAL at 06:21

## 2023-08-25 RX ADMIN — Medication 50 MILLIGRAM(S): at 06:22

## 2023-08-25 RX ADMIN — Medication 81 MILLIGRAM(S): at 13:39

## 2023-08-25 RX ADMIN — Medication 50 MILLIGRAM(S): at 17:38

## 2023-08-25 NOTE — PROGRESS NOTE ADULT - SUBJECTIVE AND OBJECTIVE BOX
[facility] NEPHROLOGY SERVICES, [facility]  NEPHROLOGY AND HYPERTENSION  [address]  [address]  [place] [address]  [phone]    MD GINA GIBSON MD YELENA ROSENBERG, MD BINNY KOSHY, MD CHRISTOPHER CAPUTO, MD EDWARD BOVER, MD          Patient events noted    MEDICATIONS  (STANDING):  amLODIPine   Tablet 10 milliGRAM(s) Oral daily  aspirin  chewable 81 milliGRAM(s) Oral daily  dextrose 5%. 1000 milliLiter(s) (50 mL/Hr) IV Continuous <Continuous>  dextrose 5%. 1000 milliLiter(s) (100 mL/Hr) IV Continuous <Continuous>  dextrose 50% Injectable 25 Gram(s) IV Push once  dextrose 50% Injectable 12.5 Gram(s) IV Push once  dextrose 50% Injectable 25 Gram(s) IV Push once  glucagon  Injectable 1 milliGRAM(s) IntraMuscular once  hydrALAZINE 50 milliGRAM(s) Oral every 12 hours  insulin lispro (ADMELOG) corrective regimen sliding scale   SubCutaneous three times a day before meals  insulin lispro (ADMELOG) corrective regimen sliding scale   SubCutaneous at bedtime  pantoprazole    Tablet 40 milliGRAM(s) Oral before breakfast  piperacillin/tazobactam IVPB.. 3.375 Gram(s) IV Intermittent every 12 hours    MEDICATIONS  (PRN):  acetaminophen     Tablet .. 650 milliGRAM(s) Oral every 6 hours PRN Temp greater or equal to 38C (100.4F), Mild Pain (1 - 3)  aluminum hydroxide/magnesium hydroxide/simethicone Suspension 30 milliLiter(s) Oral every 4 hours PRN Dyspepsia  dextrose Oral Gel 15 Gram(s) Oral once PRN Blood Glucose LESS THAN 70 milliGRAM(s)/deciliter  melatonin 3 milliGRAM(s) Oral at bedtime PRN Insomnia  ondansetron Injectable 4 milliGRAM(s) IV Push every 8 hours PRN Nausea and/or Vomiting      08-21-23 @ 07:01  -  08-22-23 @ 07:00  --------------------------------------------------------  IN: 0 mL / OUT: 1000 mL / NET: -1000 mL      PHYSICAL EXAM:      T(C): 36.4 (08-22-23 @ 11:35), Max: 37.1 (08-22-23 @ 05:24)  HR: 78 (08-22-23 @ 11:35) (76 - 81)  BP: 133/69 (08-22-23 @ 11:35) (133/69 - 167/70)  RR: 17 (08-22-23 @ 11:35) (17 - 17)  SpO2: 97% (08-22-23 @ 11:35) (97% - 97%)  Wt(kg): --  Lungs clear  Heart S1S2  Abd soft NT ND  Extremities:   tr edema                                    8.8    4.91  )-----------( 195      ( 22 Aug 2023 06:20 )             29.4     08-22    137  |  104  |  30<H>  ----------------------------<  104<H>  4.1   |  27  |  7.70<H>    Ca    8.6      22 Aug 2023 06:20    TPro  6.0  /  Alb  2.6<L>  /  TBili  0.2  /  DBili  x   /  AST  47<H>  /  ALT  68  /  AlkPhos  156<H>  08-22      LIVER FUNCTIONS - ( 22 Aug 2023 06:20 )  Alb: 2.6 g/dL / Pro: 6.0 gm/dL / ALK PHOS: 156 U/L / ALT: 68 U/L / AST: 47 U/L / GGT: x           Creatinine Trend: 7.70<--, 11.60<--    Aldosterone / Direct Renin Ratio (08.21.23 @ 14:15)    Aldosterone, Serum: 30.7: Values flagged as "Out of Range" require correlation with information  below to determine if clinically abnormal.  The expected values for Aldosterone are ng/dL  Patients Posture                                  Age            Range (ng/dL)  Upright                                            21-65                  <3.0-39.2  Supine                                               21-65                  <3.0-23.2  Upright - EDTA                               21-65          <3.0-35.3  Supine - EDTA                               21-65           <3.0-23.6 ng/dL   Renin Direct, Plasma: 5.1: Values flagged as "Out of Range" require correlation with information  below to determine if clinically abnormal.  The expected values for Renin are pg/mL  Patient's Posture                                  Age     Range(pg/mL)  Upright                                  < 40                4.2 - 52.2  Supine                                                  < 40                3.2 - 33.2  Upright                                                 > 40                3.6 - 81.6  Supine                                               > 40                2.5 - 45.1 pg/mL   Aldosterone / Direct Renin Ratio Result: 6.0: An Aldosterone/Direct Renin Activity Ratio (ARR) of greater than 3.7 is  suggestive of primary hyperaldosteronism (The Journal of Clinical  Endocrinology & Metabolism, Volume 101, Issue 5, 1 May 2016, Pages  5720-2094, https://doi.org/10.1210/nico.8157-5018). A positive ARR result  should be followed by one or more confirmatory tests to definitively  confirm or exclude the diagnosis. ratio        Assessment   ESRD; colitis;   Incidental adrenal nodule; HTN    Plan  Maintenance HD for tomorrow  Jaylen: renin level noted; ratio not significant     Nicko Jean MD
[facility] NEPHROLOGY SERVICES, [facility]  NEPHROLOGY AND HYPERTENSION  [address]  [address]  [place] [address]  [phone]    MD GINA GIBSON MD YELENA ROSENBERG, MD BINNY KOSHY, MD CHRISTOPHER CAPUTO, MD EDWARD BOVER, MD          Patient events noted    MEDICATIONS  (STANDING):  amLODIPine   Tablet 10 milliGRAM(s) Oral daily  aspirin  chewable 81 milliGRAM(s) Oral daily  dextrose 5%. 1000 milliLiter(s) (50 mL/Hr) IV Continuous <Continuous>  dextrose 5%. 1000 milliLiter(s) (100 mL/Hr) IV Continuous <Continuous>  dextrose 50% Injectable 12.5 Gram(s) IV Push once  dextrose 50% Injectable 25 Gram(s) IV Push once  dextrose 50% Injectable 25 Gram(s) IV Push once  glucagon  Injectable 1 milliGRAM(s) IntraMuscular once  hydrALAZINE 50 milliGRAM(s) Oral every 12 hours  insulin glargine Injectable (LANTUS) 10 Unit(s) SubCutaneous at bedtime  insulin lispro (ADMELOG) corrective regimen sliding scale   SubCutaneous at bedtime  insulin lispro (ADMELOG) corrective regimen sliding scale   SubCutaneous three times a day before meals  pantoprazole    Tablet 40 milliGRAM(s) Oral before breakfast  piperacillin/tazobactam IVPB.. 3.375 Gram(s) IV Intermittent every 12 hours    MEDICATIONS  (PRN):  acetaminophen     Tablet .. 650 milliGRAM(s) Oral every 6 hours PRN Temp greater or equal to 38C (100.4F), Mild Pain (1 - 3)  aluminum hydroxide/magnesium hydroxide/simethicone Suspension 30 milliLiter(s) Oral every 4 hours PRN Dyspepsia  dextrose Oral Gel 15 Gram(s) Oral once PRN Blood Glucose LESS THAN 70 milliGRAM(s)/deciliter  melatonin 3 milliGRAM(s) Oral at bedtime PRN Insomnia  ondansetron Injectable 4 milliGRAM(s) IV Push every 8 hours PRN Nausea and/or Vomiting      08-23-23 @ 07:01  -  08-23-23 @ 21:45  --------------------------------------------------------  IN: 0 mL / OUT: 1000 mL / NET: -1000 mL      PHYSICAL EXAM:      T(C): 36.7 (08-23-23 @ 16:28), Max: 36.7 (08-23-23 @ 10:17)  HR: 83 (08-23-23 @ 16:28) (77 - 86)  BP: 139/72 (08-23-23 @ 16:28) (134/72 - 178/73)  RR: 18 (08-23-23 @ 16:28) (17 - 18)  SpO2: 98% (08-23-23 @ 16:28) (97% - 99%)  Wt(kg): --  Lungs clear  Heart S1S2  Abd soft NT [place]  Extremities:   tr edema                                    8.8    4.99  )-----------( 208      ( 23 Aug 2023 10:10 )             29.3     08-22    137  |  104  |  30<H>  ----------------------------<  104<H>  4.1   |  27  |  7.70<H>    Ca    8.6      22 Aug 2023 06:20    TPro  6.0  /  Alb  2.6<L>  /  TBili  0.2  /  DBili  x   /  AST  47<H>  /  ALT  68  /  AlkPhos  156<H>  08-22      LIVER FUNCTIONS - ( 22 Aug 2023 06:20 )  Alb: 2.6 g/dL / Pro: 6.0 gm/dL / ALK PHOS: 156 U/L / ALT: 68 U/L / AST: 47 U/L / GGT: x           Creatinine Trend: 7.70<--, 11.60<--      Assessment   ESRD, maintenance    Plan:  HD for today  UF as tolerated     Nicko Jean MD
[facility] NEPHROLOGY SERVICES, [facility]  NEPHROLOGY AND HYPERTENSION  [address]  [address]  [place] [address]  [phone]    MD GINA GIBSON MD YELENA ROSENBERG, MD BINNY KOSHY, MD CHRISTOPHER CAPUTO, MD EDWARD BOVER, MD          Patient events noted  No distress  on hd     MEDICATIONS  (STANDING):  amLODIPine   Tablet 10 milliGRAM(s) Oral daily  aspirin  chewable 81 milliGRAM(s) Oral daily  dextrose 5%. 1000 milliLiter(s) (50 mL/Hr) IV Continuous <Continuous>  dextrose 5%. 1000 milliLiter(s) (100 mL/Hr) IV Continuous <Continuous>  dextrose 50% Injectable 12.5 Gram(s) IV Push once  dextrose 50% Injectable 25 Gram(s) IV Push once  dextrose 50% Injectable 25 Gram(s) IV Push once  glucagon  Injectable 1 milliGRAM(s) IntraMuscular once  hydrALAZINE 50 milliGRAM(s) Oral every 12 hours  insulin glargine Injectable (LANTUS) 10 Unit(s) SubCutaneous at bedtime  insulin lispro (ADMELOG) corrective regimen sliding scale   SubCutaneous three times a day before meals  insulin lispro (ADMELOG) corrective regimen sliding scale   SubCutaneous at bedtime  pantoprazole    Tablet 40 milliGRAM(s) Oral before breakfast  piperacillin/tazobactam IVPB.. 3.375 Gram(s) IV Intermittent every 12 hours    MEDICATIONS  (PRN):  acetaminophen     Tablet .. 650 milliGRAM(s) Oral every 6 hours PRN Temp greater or equal to 38C (100.4F), Mild Pain (1 - 3)  aluminum hydroxide/magnesium hydroxide/simethicone Suspension 30 milliLiter(s) Oral every 4 hours PRN Dyspepsia  dextrose Oral Gel 15 Gram(s) Oral once PRN Blood Glucose LESS THAN 70 milliGRAM(s)/deciliter  melatonin 3 milliGRAM(s) Oral at bedtime PRN Insomnia  ondansetron Injectable 4 milliGRAM(s) IV Push every 8 hours PRN Nausea and/or Vomiting      08-25-23 @ 07:01  -  08-25-23 @ 20:51  --------------------------------------------------------  IN: 160 mL / OUT: 1000 mL / NET: -840 mL      PHYSICAL EXAM:      T(C): 36.3 (08-25-23 @ 17:29), Max: 36.9 (08-25-23 @ 05:57)  HR: 117 (08-25-23 @ 17:29) (77 - 117)  BP: 152/78 (08-25-23 @ 17:29) (130/66 - 160/71)  RR: 18 (08-25-23 @ 17:29) (18 - 18)  SpO2: 97% (08-25-23 @ 17:29) (96% - 100%)  Wt(kg): --  Lungs clear  Heart S1S2  Abd soft NT ND  Extremities:   tr edema                          Creatinine Trend: 7.70<--, 11.60<--      Assessment   ESRD maintenance       Plan:  HD for today   UF as tolerated   Will follow  Discharge planning     Nicko Jean MD
Patient is a 73y old  Female who presents with a chief complaint of Abdominal Pain (23 Aug 2023 11:15)      INTERVAL HPI/OVERNIGHT EVENTS: No acute events overnight, pain improved.     MEDICATIONS  (STANDING):  amLODIPine   Tablet 10 milliGRAM(s) Oral daily  aspirin  chewable 81 milliGRAM(s) Oral daily  dextrose 5%. 1000 milliLiter(s) (50 mL/Hr) IV Continuous <Continuous>  dextrose 5%. 1000 milliLiter(s) (100 mL/Hr) IV Continuous <Continuous>  dextrose 50% Injectable 25 Gram(s) IV Push once  dextrose 50% Injectable 12.5 Gram(s) IV Push once  dextrose 50% Injectable 25 Gram(s) IV Push once  glucagon  Injectable 1 milliGRAM(s) IntraMuscular once  hydrALAZINE 50 milliGRAM(s) Oral every 12 hours  insulin lispro (ADMELOG) corrective regimen sliding scale   SubCutaneous three times a day before meals  insulin lispro (ADMELOG) corrective regimen sliding scale   SubCutaneous at bedtime  pantoprazole    Tablet 40 milliGRAM(s) Oral before breakfast  piperacillin/tazobactam IVPB.. 3.375 Gram(s) IV Intermittent every 12 hours    MEDICATIONS  (PRN):  acetaminophen     Tablet .. 650 milliGRAM(s) Oral every 6 hours PRN Temp greater or equal to 38C (100.4F), Mild Pain (1 - 3)  aluminum hydroxide/magnesium hydroxide/simethicone Suspension 30 milliLiter(s) Oral every 4 hours PRN Dyspepsia  dextrose Oral Gel 15 Gram(s) Oral once PRN Blood Glucose LESS THAN 70 milliGRAM(s)/deciliter  melatonin 3 milliGRAM(s) Oral at bedtime PRN Insomnia  ondansetron Injectable 4 milliGRAM(s) IV Push every 8 hours PRN Nausea and/or Vomiting      Allergies    No Known Allergies    Intolerances        REVIEW OF SYSTEMS:  CONSTITUTIONAL: +ve for fatigue  EYES: No eye pain, visual disturbances, or discharge  ENMT:  No difficulty hearing, tinnitus, vertigo; No sinus or throat pain  NECK: No pain or stiffness      Vital Signs Last 24 Hrs  T(C): 36.5 (23 Aug 2023 11:25), Max: 36.7 (23 Aug 2023 10:17)  T(F): 97.7 (23 Aug 2023 11:25), Max: 98 (23 Aug 2023 10:17)  HR: 85 (23 Aug 2023 11:25) (77 - 85)  BP: 152/66 (23 Aug 2023 11:25) (144/73 - 178/73)  BP(mean): --  RR: 17 (23 Aug 2023 11:25) (17 - 18)  SpO2: 99% (23 Aug 2023 11:25) (97% - 99%)    Parameters below as of 23 Aug 2023 11:25  Patient On (Oxygen Delivery Method): room air        PHYSICAL EXAM:  GENERAL: NAD  HEAD:  Atraumatic, Normocephalic  EYES: EOMI, PERRLA, conjunctiva and sclera clear  ENMT: No tonsillar erythema, exudates, or enlargement; Moist mucous membranes, Good dentition, No lesions  NECK: Supple, No JVD, Normal thyroid      LABS:                        8.8    4.99  )-----------( 208      ( 23 Aug 2023 10:10 )             29.3     08-22    137  |  104  |  30<H>  ----------------------------<  104<H>  4.1   |  27  |  7.70<H>    Ca    8.6      22 Aug 2023 06:20    TPro  6.0  /  Alb  2.6<L>  /  TBili  0.2  /  DBili  x   /  AST  47<H>  /  ALT  68  /  AlkPhos  156<H>  08-22      Urinalysis Basic - ( 22 Aug 2023 06:20 )    Color: x / Appearance: x / SG: x / pH: x  Gluc: 104 mg/dL / Ketone: x  / Bili: x / Urobili: x   Blood: x / Protein: x / Nitrite: x   Leuk Esterase: x / RBC: x / WBC x   Sq Epi: x / Non Sq Epi: x / Bacteria: x      
Patient is a 73y old  Female who presents with a chief complaint of Abdominal Pain (23 Aug 2023 21:45)      INTERVAL HPI/OVERNIGHT EVENTS: No acute events overnight, afebrile.     MEDICATIONS  (STANDING):  amLODIPine   Tablet 10 milliGRAM(s) Oral daily  aspirin  chewable 81 milliGRAM(s) Oral daily  dextrose 5%. 1000 milliLiter(s) (50 mL/Hr) IV Continuous <Continuous>  dextrose 5%. 1000 milliLiter(s) (100 mL/Hr) IV Continuous <Continuous>  dextrose 50% Injectable 25 Gram(s) IV Push once  dextrose 50% Injectable 12.5 Gram(s) IV Push once  dextrose 50% Injectable 25 Gram(s) IV Push once  glucagon  Injectable 1 milliGRAM(s) IntraMuscular once  hydrALAZINE 50 milliGRAM(s) Oral every 12 hours  insulin glargine Injectable (LANTUS) 10 Unit(s) SubCutaneous at bedtime  insulin lispro (ADMELOG) corrective regimen sliding scale   SubCutaneous at bedtime  insulin lispro (ADMELOG) corrective regimen sliding scale   SubCutaneous three times a day before meals  pantoprazole    Tablet 40 milliGRAM(s) Oral before breakfast  piperacillin/tazobactam IVPB.. 3.375 Gram(s) IV Intermittent every 12 hours    MEDICATIONS  (PRN):  acetaminophen     Tablet .. 650 milliGRAM(s) Oral every 6 hours PRN Temp greater or equal to 38C (100.4F), Mild Pain (1 - 3)  aluminum hydroxide/magnesium hydroxide/simethicone Suspension 30 milliLiter(s) Oral every 4 hours PRN Dyspepsia  dextrose Oral Gel 15 Gram(s) Oral once PRN Blood Glucose LESS THAN 70 milliGRAM(s)/deciliter  melatonin 3 milliGRAM(s) Oral at bedtime PRN Insomnia  ondansetron Injectable 4 milliGRAM(s) IV Push every 8 hours PRN Nausea and/or Vomiting      Allergies    No Known Allergies    Intolerances        REVIEW OF SYSTEMS:  CONSTITUTIONAL: +ve for fatigue  EYES: No eye pain, visual disturbances, or discharge  ENMT:  No difficulty hearing, tinnitus, vertigo; No sinus or throat pain  NECK: No pain or stiffness      Vital Signs Last 24 Hrs  T(C): 36.5 (24 Aug 2023 11:06), Max: 36.7 (23 Aug 2023 16:28)  T(F): 97.7 (24 Aug 2023 11:06), Max: 98.1 (23 Aug 2023 16:28)  HR: 80 (24 Aug 2023 11:06) (73 - 86)  BP: 144/74 (24 Aug 2023 11:06) (134/72 - 169/75)  BP(mean): 107 (24 Aug 2023 05:15) (104 - 107)  RR: 18 (24 Aug 2023 11:06) (17 - 19)  SpO2: 100% (24 Aug 2023 11:06) (98% - 100%)    Parameters below as of 24 Aug 2023 11:06  Patient On (Oxygen Delivery Method): room air        PHYSICAL EXAM:    HEAD:  Atraumatic, Normocephalic  EYES: EOMI, PERRLA, conjunctiva and sclera clear  ENMT: No tonsillar erythema, exudates, or enlargement; Moist mucous membranes, Good dentition, No lesions  NECK: Supple, No JVD, Normal thyroid  NERVOUS SYSTEM:  Alert & Oriented X3, Good concentration; Motor Strength 5/5 B/L upper and lower extremities; DTRs 2+ intact and symmetric      LABS:                        8.8    4.99  )-----------( 208      ( 23 Aug 2023 10:10 )             29.3               CAPILLARY BLOOD GLUCOSE      POCT Blood Glucose.: 214 mg/dL (24 Aug 2023 11:20)  POCT Blood Glucose.: 118 mg/dL (24 Aug 2023 07:45)  POCT Blood Glucose.: 180 mg/dL (23 Aug 2023 22:00)  POCT Blood Glucose.: 161 mg/dL (23 Aug 2023 17:05)    
Patient is a 73y old  Female who presents with a chief complaint of Abdominal Pain (21 Aug 2023 11:26)      INTERVAL HPI/OVERNIGHT EVENTS: No acute events overnight, still complaining of abdominal pain - no different than yesterday. Afebrile. Able to tolerate diet.     MEDICATIONS  (STANDING):  amLODIPine   Tablet 10 milliGRAM(s) Oral daily  aspirin  chewable 81 milliGRAM(s) Oral daily  dextrose 5%. 1000 milliLiter(s) (50 mL/Hr) IV Continuous <Continuous>  dextrose 5%. 1000 milliLiter(s) (100 mL/Hr) IV Continuous <Continuous>  dextrose 50% Injectable 25 Gram(s) IV Push once  dextrose 50% Injectable 12.5 Gram(s) IV Push once  dextrose 50% Injectable 25 Gram(s) IV Push once  glucagon  Injectable 1 milliGRAM(s) IntraMuscular once  hydrALAZINE 50 milliGRAM(s) Oral every 12 hours  insulin lispro (ADMELOG) corrective regimen sliding scale   SubCutaneous three times a day before meals  insulin lispro (ADMELOG) corrective regimen sliding scale   SubCutaneous at bedtime  pantoprazole    Tablet 40 milliGRAM(s) Oral before breakfast  piperacillin/tazobactam IVPB.. 3.375 Gram(s) IV Intermittent every 12 hours    MEDICATIONS  (PRN):  acetaminophen     Tablet .. 650 milliGRAM(s) Oral every 6 hours PRN Temp greater or equal to 38C (100.4F), Mild Pain (1 - 3)  aluminum hydroxide/magnesium hydroxide/simethicone Suspension 30 milliLiter(s) Oral every 4 hours PRN Dyspepsia  dextrose Oral Gel 15 Gram(s) Oral once PRN Blood Glucose LESS THAN 70 milliGRAM(s)/deciliter  melatonin 3 milliGRAM(s) Oral at bedtime PRN Insomnia  ondansetron Injectable 4 milliGRAM(s) IV Push every 8 hours PRN Nausea and/or Vomiting      Allergies    No Known Allergies    Intolerances        REVIEW OF SYSTEMS:  CONSTITUTIONAL: No fever, weight loss, or fatigue  EYES: No eye pain, visual disturbances, or discharge  ENMT:  No difficulty hearing, tinnitus, vertigo; No sinus or throat pain  RESPIRATORY: No cough, wheezing, chills or hemoptysis; No shortness of breath  CARDIOVASCULAR: No chest pain, palpitations, dizziness, or leg swelling  GASTROINTESTINAL: +ve for abdominal pain.   GENITOURINARY: No dysuria, frequency, hematuria, or incontinence  NEUROLOGICAL: No headaches, memory loss, loss of strength, numbness, or tremors  SKIN: No itching, burning, rashes, or lesions   LYMPH NODES: No enlarged glands  ENDOCRINE: No heat or cold intolerance; No hair loss  MUSCULOSKELETAL: No joint pain or swelling; No muscle, back, or extremity pain  PSYCHIATRIC: No depression, anxiety, mood swings, or difficulty sleeping  HEME/LYMPH: No easy bruising, or bleeding gums  ALLERGY AND IMMUNOLOGIC: No hives or eczema    Vital Signs Last 24 Hrs  T(C): 37.1 (22 Aug 2023 05:24), Max: 37.2 (21 Aug 2023 11:32)  T(F): 98.8 (22 Aug 2023 05:24), Max: 98.9 (21 Aug 2023 11:32)  HR: 76 (22 Aug 2023 05:24) (76 - 83)  BP: 167/70 (22 Aug 2023 05:24) (145/69 - 167/70)  BP(mean): --  RR: 17 (22 Aug 2023 05:24) (16 - 20)  SpO2: 97% (22 Aug 2023 05:24) (97% - 99%)    Parameters below as of 22 Aug 2023 05:24  Patient On (Oxygen Delivery Method): room air        PHYSICAL EXAM:  GENERAL: NAD  HEAD:  Atraumatic, Normocephalic  EYES: EOMI, PERRLA, conjunctiva and sclera clear  ENMT: No tonsillar erythema, exudates, or enlargement; Moist mucous membranes, Good dentition, No lesions  NECK: Supple, No JVD, Normal thyroid  NERVOUS SYSTEM:  Alert & Oriented X3, Good concentration; Motor Strength 5/5 B/L upper and lower extremities; DTRs 2+ intact and symmetric  CHEST/LUNG: Clear to ascultation  bilaterally; No rales, rhonchi, wheezing, or rubs      LABS:                        8.8    4.91  )-----------( 195      ( 22 Aug 2023 06:20 )             29.4     08-22    137  |  104  |  30<H>  ----------------------------<  104<H>  4.1   |  27  |  7.70<H>    Ca    8.6      22 Aug 2023 06:20    TPro  6.0  /  Alb  2.6<L>  /  TBili  0.2  /  DBili  x   /  AST  47<H>  /  ALT  68  /  AlkPhos  156<H>  08-22      Urinalysis Basic - ( 22 Aug 2023 06:20 )    Color: x / Appearance: x / SG: x / pH: x  Gluc: 104 mg/dL / Ketone: x  / Bili: x / Urobili: x   Blood: x / Protein: x / Nitrite: x   Leuk Esterase: x / RBC: x / WBC x   Sq Epi: x / Non Sq Epi: x / Bacteria: x      CAPILLARY BLOOD GLUCOSE      POCT Blood Glucose.: 111 mg/dL (22 Aug 2023 07:55)  POCT Blood Glucose.: 197 mg/dL (21 Aug 2023 21:42)  POCT Blood Glucose.: 186 mg/dL (21 Aug 2023 11:01)

## 2023-08-27 ENCOUNTER — EMERGENCY (EMERGENCY)
Facility: HOSPITAL | Age: 73
LOS: 0 days | Discharge: HOME | End: 2023-08-27
Attending: STUDENT IN AN ORGANIZED HEALTH CARE EDUCATION/TRAINING PROGRAM
Payer: MEDICAID

## 2023-08-27 VITALS
WEIGHT: 164.91 LBS | TEMPERATURE: 98 F | DIASTOLIC BLOOD PRESSURE: 66 MMHG | OXYGEN SATURATION: 99 % | SYSTOLIC BLOOD PRESSURE: 141 MMHG | HEART RATE: 102 BPM | HEIGHT: 65 IN | RESPIRATION RATE: 16 BRPM

## 2023-08-27 VITALS
OXYGEN SATURATION: 98 % | HEART RATE: 88 BPM | DIASTOLIC BLOOD PRESSURE: 73 MMHG | TEMPERATURE: 98 F | RESPIRATION RATE: 18 BRPM | SYSTOLIC BLOOD PRESSURE: 180 MMHG

## 2023-08-27 DIAGNOSIS — R11.10 VOMITING, UNSPECIFIED: ICD-10-CM

## 2023-08-27 DIAGNOSIS — Z95.5 PRESENCE OF CORONARY ANGIOPLASTY IMPLANT AND GRAFT: Chronic | ICD-10-CM

## 2023-08-27 DIAGNOSIS — R19.7 DIARRHEA, UNSPECIFIED: ICD-10-CM

## 2023-08-27 DIAGNOSIS — E11.22 TYPE 2 DIABETES MELLITUS WITH DIABETIC CHRONIC KIDNEY DISEASE: ICD-10-CM

## 2023-08-27 DIAGNOSIS — Z87.19 PERSONAL HISTORY OF OTHER DISEASES OF THE DIGESTIVE SYSTEM: ICD-10-CM

## 2023-08-27 DIAGNOSIS — N18.6 END STAGE RENAL DISEASE: ICD-10-CM

## 2023-08-27 DIAGNOSIS — Z79.85 LONG-TERM (CURRENT) USE OF INJECTABLE NON-INSULIN ANTIDIABETIC DRUGS: ICD-10-CM

## 2023-08-27 DIAGNOSIS — R10.30 LOWER ABDOMINAL PAIN, UNSPECIFIED: ICD-10-CM

## 2023-08-27 DIAGNOSIS — I25.10 ATHEROSCLEROTIC HEART DISEASE OF NATIVE CORONARY ARTERY WITHOUT ANGINA PECTORIS: ICD-10-CM

## 2023-08-27 DIAGNOSIS — I12.0 HYPERTENSIVE CHRONIC KIDNEY DISEASE WITH STAGE 5 CHRONIC KIDNEY DISEASE OR END STAGE RENAL DISEASE: ICD-10-CM

## 2023-08-27 DIAGNOSIS — Z79.82 LONG TERM (CURRENT) USE OF ASPIRIN: ICD-10-CM

## 2023-08-27 DIAGNOSIS — Z79.4 LONG TERM (CURRENT) USE OF INSULIN: ICD-10-CM

## 2023-08-27 DIAGNOSIS — Z99.2 DEPENDENCE ON RENAL DIALYSIS: ICD-10-CM

## 2023-08-27 LAB
ALBUMIN SERPL ELPH-MCNC: 2.9 G/DL — LOW (ref 3.3–5)
ALP SERPL-CCNC: 139 U/L — HIGH (ref 40–120)
ALT FLD-CCNC: 88 U/L — HIGH (ref 12–78)
ANION GAP SERPL CALC-SCNC: 8 MMOL/L — SIGNIFICANT CHANGE UP (ref 5–17)
AST SERPL-CCNC: 53 U/L — HIGH (ref 15–37)
BASOPHILS # BLD AUTO: 0.03 K/UL — SIGNIFICANT CHANGE UP (ref 0–0.2)
BASOPHILS NFR BLD AUTO: 0.5 % — SIGNIFICANT CHANGE UP (ref 0–2)
BILIRUB SERPL-MCNC: 0.2 MG/DL — SIGNIFICANT CHANGE UP (ref 0.2–1.2)
BUN SERPL-MCNC: 47 MG/DL — HIGH (ref 7–23)
CALCIUM SERPL-MCNC: 9 MG/DL — SIGNIFICANT CHANGE UP (ref 8.5–10.1)
CHLORIDE SERPL-SCNC: 99 MMOL/L — SIGNIFICANT CHANGE UP (ref 96–108)
CO2 SERPL-SCNC: 28 MMOL/L — SIGNIFICANT CHANGE UP (ref 22–31)
CREAT SERPL-MCNC: 10.1 MG/DL — HIGH (ref 0.5–1.3)
EGFR: 4 ML/MIN/1.73M2 — LOW
EOSINOPHIL # BLD AUTO: 0.05 K/UL — SIGNIFICANT CHANGE UP (ref 0–0.5)
EOSINOPHIL NFR BLD AUTO: 0.8 % — SIGNIFICANT CHANGE UP (ref 0–6)
GLUCOSE SERPL-MCNC: 246 MG/DL — HIGH (ref 70–99)
HCT VFR BLD CALC: 24.8 % — LOW (ref 34.5–45)
HGB BLD-MCNC: 7.4 G/DL — LOW (ref 11.5–15.5)
IMM GRANULOCYTES NFR BLD AUTO: 0.3 % — SIGNIFICANT CHANGE UP (ref 0–0.9)
LIDOCAIN IGE QN: 47 U/L — SIGNIFICANT CHANGE UP (ref 13–75)
LYMPHOCYTES # BLD AUTO: 1.19 K/UL — SIGNIFICANT CHANGE UP (ref 1–3.3)
LYMPHOCYTES # BLD AUTO: 18.7 % — SIGNIFICANT CHANGE UP (ref 13–44)
MCHC RBC-ENTMCNC: 28.5 PG — SIGNIFICANT CHANGE UP (ref 27–34)
MCHC RBC-ENTMCNC: 29.8 G/DL — LOW (ref 32–36)
MCV RBC AUTO: 95.4 FL — SIGNIFICANT CHANGE UP (ref 80–100)
MONOCYTES # BLD AUTO: 0.48 K/UL — SIGNIFICANT CHANGE UP (ref 0–0.9)
MONOCYTES NFR BLD AUTO: 7.6 % — SIGNIFICANT CHANGE UP (ref 2–14)
NEUTROPHILS # BLD AUTO: 4.58 K/UL — SIGNIFICANT CHANGE UP (ref 1.8–7.4)
NEUTROPHILS NFR BLD AUTO: 72.1 % — SIGNIFICANT CHANGE UP (ref 43–77)
NRBC # BLD: 0 /100 WBCS — SIGNIFICANT CHANGE UP (ref 0–0)
PLATELET # BLD AUTO: 245 K/UL — SIGNIFICANT CHANGE UP (ref 150–400)
POTASSIUM SERPL-MCNC: 4.1 MMOL/L — SIGNIFICANT CHANGE UP (ref 3.5–5.3)
POTASSIUM SERPL-SCNC: 4.1 MMOL/L — SIGNIFICANT CHANGE UP (ref 3.5–5.3)
PROT SERPL-MCNC: 6.3 GM/DL — SIGNIFICANT CHANGE UP (ref 6–8.3)
RBC # BLD: 2.6 M/UL — LOW (ref 3.8–5.2)
RBC # FLD: 14.2 % — SIGNIFICANT CHANGE UP (ref 10.3–14.5)
SODIUM SERPL-SCNC: 135 MMOL/L — SIGNIFICANT CHANGE UP (ref 135–145)
WBC # BLD: 6.35 K/UL — SIGNIFICANT CHANGE UP (ref 3.8–10.5)
WBC # FLD AUTO: 6.35 K/UL — SIGNIFICANT CHANGE UP (ref 3.8–10.5)

## 2023-08-27 PROCEDURE — 74176 CT ABD & PELVIS W/O CONTRAST: CPT | Mod: 26,MA

## 2023-08-27 PROCEDURE — 99284 EMERGENCY DEPT VISIT MOD MDM: CPT

## 2023-08-27 RX ORDER — IOHEXOL 300 MG/ML
30 INJECTION, SOLUTION INTRAVENOUS ONCE
Refills: 0 | Status: COMPLETED | OUTPATIENT
Start: 2023-08-27 | End: 2023-08-27

## 2023-08-27 RX ADMIN — IOHEXOL 30 MILLILITER(S): 300 INJECTION, SOLUTION INTRAVENOUS at 19:16

## 2023-08-27 NOTE — ED PROVIDER NOTE - PHYSICAL EXAMINATION
GENERAL: Awake, alert, NAD  HEENT: NC/AT, moist mucous membranes  LUNGS: CTAB, no wheezes or crackles   CARDIAC: RRR, no m/r/g  ABDOMEN: Soft, very mild lower abd tenderness, non distended, no rebound, no guarding  EXT: No edema, no calf tenderness, 2+ DP pulses bilaterally, no deformities.  NEURO: A&Ox3. Moving all extremities.  SKIN: Warm and dry. No rash.  PSYCH: Normal affect.

## 2023-08-27 NOTE — ED ADULT NURSE NOTE - NS ED NURSE DC INFO COMPLEXITY
Simple: Patient demonstrates quick and easy understanding Simple: Patient demonstrates quick and easy understanding/Verbalized Understanding

## 2023-08-27 NOTE — ED ADULT NURSE NOTE - NSFALLUNIVINTERV_ED_ALL_ED
Bed/Stretcher in lowest position, wheels locked, appropriate side rails in place/Call bell, personal items and telephone in reach/Instruct patient to call for assistance before getting out of bed/chair/stretcher/Non-slip footwear applied when patient is off stretcher/[place] to call system/Physically safe environment - no spills, clutter or unnecessary equipment/Purposeful proactive rounding/Room/bathroom lighting operational, light cord in reach

## 2023-08-27 NOTE — ED PROVIDER NOTE - PATIENT PORTAL LINK FT
You can access the FollowMyHealth Patient Portal offered by [facility] by registering at the following website: [email or web address]. By joining [facility]’s FollowMyHealth portal, you will also be able to view your health information using other applications (apps) compatible with our system.

## 2023-08-27 NOTE — ED ADULT TRIAGE NOTE - CHIEF COMPLAINT QUOTE
BIBA from shelter for lower abdominal pain with vomiting on and off x 2 weeks, was seen two days for the same thing but has appt with gastro sept 9th

## 2023-08-27 NOTE — ED ADULT NURSE NOTE - OBJECTIVE STATEMENT
pt axox3 presents to the ED c/o lower abd pain x2 weeks with vomiting. Pt has dialysis MWF with L arm fistula access. Denies cp, sob, headache, dizziness, numbess, weakness. States she was here last week for similar issue but dc'd with no relief.

## 2023-08-27 NOTE — ED ADULT TRIAGE NOTE - HEART RATE (BEATS/MIN)
Consult Note


Consult Note


I was asked to evaluate the patient at the request of Dr. Silva for dialysis 

management


Patient seen in emergency room room 7 discussed with RN





Chief Complaint:  Shortness of breath





HPI: 85-year-old male history of ESRD on hemodialysis, COPD oxygen dependent at 

home presents for evaluation of shortness of breath.  


Per EMS, they were called by family stated he was having increased work of 

breathing and shortness of breath this morning.  No cough or fevers have been 

reported.  His son is currently in the intensive care unit for COVID-19 

infection.  Patient finished hemodialysis yesterday after a full treatment.  

Denies any swelling.  Currently denying any shortness of breath, chest pain, 

nausea, vomiting, diarrhea.


 


PMH: ESRD, hypertension, COPD


 


PSH: Reviewed


 


Allergies: None reported


 Social Hx: Denies drug or alcohol use


No Known Allergies (Unverified , 4/16/13)





Hx Hypertension:  Yes


Hx Diabetes:  Yes


Hx Dialysis:  Yes


Hx Seizures:  Yes - pt. claims to have had one 35 years ago


























Assessment/Plan








End-stage renal disease on hemodialysis


Has arm fistula for dialysis-Next dialysis Wednesday, April 15


Dyspnea and shortness of breath, history of COPD, oxygen dependent at home


Exposure to COVID 19


Diabetes mellitus


Hypertension


Remote history of seizure


Anemia of kidney disease








Hemodialysis tomorrow


Keep the blood pressure and blood sugar in check


2D echocardiogram


Antibiotics and pulmonary support per consultants





Chest x-ray:


Findings: There is a left chest  HERO catheter, tip projected at the level of 

the


superior vena cava. Some atelectatic bands are seen in the left midlung. The 

lungs


and pleural spaces are otherwise clear. The heart size is upper limits of 

normal. A


stent is seen in the left axilla


Impression: Left midlung atelectasis. No acute process otherwise














Vik Morrison MD Apr 14, 2020 19:27 102

## 2023-08-27 NOTE — ED PROVIDER NOTE - OBJECTIVE STATEMENT
74 y/o F with PMH HTN, DM, CAD, ESRD on dialysis presenting to the ED for abdominal pain. Patient was previously admitted on 8/23 for similar symptoms and diagnosed with colitis. States she was taking her antibiotics but still having some lower abd pain. She does endorse some intermittent vomiting and diarrhea. No fever or chills. She is scheduled to f/u with a GI in September.

## 2023-08-27 NOTE — ED ADULT TRIAGE NOTE - ESI TRIAGE ACUITY LEVEL, MLM
89 YO female with PMHx of esophageal dysfunction, hiatal hernia, ?CVA, and glaucoma who presented with chief complaint of progressive dysphagia and regurgitation possibly stricture vs. dysmotility vs. other pathology resulting in decreased PO intake. On hospital day 2 she was found to be hypotensive (SBPs 70s down from 120s) and in AFib with RVR with subsequent desaturation and hypoglycemia to 47. Transferred from [facility] to [facility] for management and workup for new onset AFib RVR which resolved s/p resuscitation and etiologies of esophageal dysphagia. Awaiting step down to regional medicine floor. 3

## 2023-08-27 NOTE — ED PROVIDER NOTE - CLINICAL SUMMARY MEDICAL DECISION MAKING FREE TEXT BOX
74 y/o F with PMH HTN, DM, CAD, ESRD on dialysis presenting to the ED for abdominal pain.  Vitals stable.  Patient is well appearing in NAD.  She has minimal lower abd tenderness.  Will repeat CT w/ oral contrast.  Reassess following labs/imaging    Labs reviewed and within normal limits. CT imaging reviewed and colitis has improved. Patient is hemodynamically stable and not in acute pain in the ED. Will discharge to f/u with her GI.

## 2023-08-27 NOTE — ED PROVIDER NOTE - NS ED ROS FT
CONST: no fevers, no chills  EYES: no pain, no vision changes  ENT: no sore throat, no ear pain, no change in hearing  CV: no chest pain, no leg swelling  RESP: no shortness of breath, no cough  ABD: + abdominal pain, no nausea, + vomiting, + diarrhea  : no dysuria, no flank pain, no hematuria  MSK: no back pain, no extremity pain  NEURO: no headache or additional neurologic complaints  HEME: no easy bleeding  SKIN:  no rash

## 2023-08-30 DIAGNOSIS — R77.8 OTHER SPECIFIED ABNORMALITIES OF PLASMA PROTEINS: ICD-10-CM

## 2023-08-30 DIAGNOSIS — Z99.2 DEPENDENCE ON RENAL DIALYSIS: ICD-10-CM

## 2023-08-30 DIAGNOSIS — I12.0 HYPERTENSIVE CHRONIC KIDNEY DISEASE WITH STAGE 5 CHRONIC KIDNEY DISEASE OR END STAGE RENAL DISEASE: ICD-10-CM

## 2023-08-30 DIAGNOSIS — R10.9 UNSPECIFIED ABDOMINAL PAIN: ICD-10-CM

## 2023-08-30 DIAGNOSIS — K52.9 NONINFECTIVE GASTROENTERITIS AND COLITIS, UNSPECIFIED: ICD-10-CM

## 2023-08-30 DIAGNOSIS — E11.22 TYPE 2 DIABETES MELLITUS WITH DIABETIC CHRONIC KIDNEY DISEASE: ICD-10-CM

## 2023-08-30 DIAGNOSIS — E78.5 HYPERLIPIDEMIA, UNSPECIFIED: ICD-10-CM

## 2023-08-30 DIAGNOSIS — Z79.4 LONG TERM (CURRENT) USE OF INSULIN: ICD-10-CM

## 2023-08-30 DIAGNOSIS — D64.9 ANEMIA, UNSPECIFIED: ICD-10-CM

## 2023-08-30 DIAGNOSIS — N18.6 END STAGE RENAL DISEASE: ICD-10-CM

## 2023-08-30 DIAGNOSIS — Z79.82 LONG TERM (CURRENT) USE OF ASPIRIN: ICD-10-CM

## 2023-10-09 ENCOUNTER — INPATIENT (INPATIENT)
Facility: HOSPITAL | Age: 73
LOS: 12 days | Discharge: NURSING, REHAB OR LONG-TERM CARE | DRG: 321 | End: 2023-10-22
Attending: INTERNAL MEDICINE | Admitting: INTERNAL MEDICINE
Payer: MEDICAID

## 2023-10-09 VITALS
WEIGHT: 163.58 LBS | RESPIRATION RATE: 17 BRPM | HEART RATE: 90 BPM | HEIGHT: 65 IN | TEMPERATURE: 97 F | DIASTOLIC BLOOD PRESSURE: 63 MMHG | OXYGEN SATURATION: 98 % | SYSTOLIC BLOOD PRESSURE: 142 MMHG

## 2023-10-09 DIAGNOSIS — E78.00 PURE HYPERCHOLESTEROLEMIA, UNSPECIFIED: ICD-10-CM

## 2023-10-09 DIAGNOSIS — I21.4 NON-ST ELEVATION (NSTEMI) MYOCARDIAL INFARCTION: ICD-10-CM

## 2023-10-09 DIAGNOSIS — I10 ESSENTIAL (PRIMARY) HYPERTENSION: ICD-10-CM

## 2023-10-09 DIAGNOSIS — M10.9 GOUT, UNSPECIFIED: ICD-10-CM

## 2023-10-09 DIAGNOSIS — N18.6 END STAGE RENAL DISEASE: ICD-10-CM

## 2023-10-09 DIAGNOSIS — Z95.5 PRESENCE OF CORONARY ANGIOPLASTY IMPLANT AND GRAFT: Chronic | ICD-10-CM

## 2023-10-09 DIAGNOSIS — E11.9 TYPE 2 DIABETES MELLITUS WITHOUT COMPLICATIONS: ICD-10-CM

## 2023-10-09 LAB
ALBUMIN SERPL ELPH-MCNC: 3.8 G/DL — SIGNIFICANT CHANGE UP (ref 3.3–5)
ALP SERPL-CCNC: 133 U/L — HIGH (ref 40–120)
ALT FLD-CCNC: <5 U/L — LOW (ref 10–45)
ANION GAP SERPL CALC-SCNC: 17 MMOL/L — SIGNIFICANT CHANGE UP (ref 5–17)
APTT BLD: 32 SEC — SIGNIFICANT CHANGE UP (ref 24.5–35.6)
AST SERPL-CCNC: 17 U/L — SIGNIFICANT CHANGE UP (ref 10–40)
BILIRUB SERPL-MCNC: 0.2 MG/DL — SIGNIFICANT CHANGE UP (ref 0.2–1.2)
BUN SERPL-MCNC: 28 MG/DL — HIGH (ref 7–23)
CALCIUM SERPL-MCNC: 9.2 MG/DL — SIGNIFICANT CHANGE UP (ref 8.4–10.5)
CHLORIDE SERPL-SCNC: 94 MMOL/L — LOW (ref 96–108)
CO2 SERPL-SCNC: 27 MMOL/L — SIGNIFICANT CHANGE UP (ref 22–31)
CREAT SERPL-MCNC: 8.18 MG/DL — HIGH (ref 0.5–1.3)
EGFR: 5 ML/MIN/1.73M2 — LOW
GLUCOSE SERPL-MCNC: 162 MG/DL — HIGH (ref 70–99)
HCT VFR BLD CALC: 38.6 % — SIGNIFICANT CHANGE UP (ref 34.5–45)
HGB BLD-MCNC: 11.7 G/DL — SIGNIFICANT CHANGE UP (ref 11.5–15.5)
INR BLD: 0.98 — SIGNIFICANT CHANGE UP (ref 0.85–1.18)
MAGNESIUM SERPL-MCNC: 2.3 MG/DL — SIGNIFICANT CHANGE UP (ref 1.6–2.6)
MCHC RBC-ENTMCNC: 30.2 PG — SIGNIFICANT CHANGE UP (ref 27–34)
MCHC RBC-ENTMCNC: 30.3 GM/DL — LOW (ref 32–36)
MCV RBC AUTO: 99.5 FL — SIGNIFICANT CHANGE UP (ref 80–100)
NRBC # BLD: 0 /100 WBCS — SIGNIFICANT CHANGE UP (ref 0–0)
PHOSPHATE SERPL-MCNC: 6.3 MG/DL — HIGH (ref 2.5–4.5)
PLATELET # BLD AUTO: 293 K/UL — SIGNIFICANT CHANGE UP (ref 150–400)
POTASSIUM SERPL-MCNC: 4.3 MMOL/L — SIGNIFICANT CHANGE UP (ref 3.5–5.3)
POTASSIUM SERPL-SCNC: 4.3 MMOL/L — SIGNIFICANT CHANGE UP (ref 3.5–5.3)
PROT SERPL-MCNC: 6.7 G/DL — SIGNIFICANT CHANGE UP (ref 6–8.3)
PROTHROM AB SERPL-ACNC: 11.2 SEC — SIGNIFICANT CHANGE UP (ref 9.5–13)
RBC # BLD: 3.88 M/UL — SIGNIFICANT CHANGE UP (ref 3.8–5.2)
RBC # FLD: 18 % — HIGH (ref 10.3–14.5)
SODIUM SERPL-SCNC: 138 MMOL/L — SIGNIFICANT CHANGE UP (ref 135–145)
WBC # BLD: 6.88 K/UL — SIGNIFICANT CHANGE UP (ref 3.8–10.5)
WBC # FLD AUTO: 6.88 K/UL — SIGNIFICANT CHANGE UP (ref 3.8–10.5)

## 2023-10-09 RX ORDER — SODIUM CHLORIDE 9 MG/ML
1000 INJECTION, SOLUTION INTRAVENOUS
Refills: 0 | Status: DISCONTINUED | OUTPATIENT
Start: 2023-10-09 | End: 2023-10-22

## 2023-10-09 RX ORDER — INSULIN LISPRO 100/ML
VIAL (ML) SUBCUTANEOUS
Refills: 0 | Status: DISCONTINUED | OUTPATIENT
Start: 2023-10-09 | End: 2023-10-22

## 2023-10-09 RX ORDER — ATORVASTATIN CALCIUM 80 MG/1
80 TABLET, FILM COATED ORAL AT BEDTIME
Refills: 0 | Status: DISCONTINUED | OUTPATIENT
Start: 2023-10-09 | End: 2023-10-22

## 2023-10-09 RX ORDER — ATORVASTATIN CALCIUM 80 MG/1
1 TABLET, FILM COATED ORAL
Refills: 0 | DISCHARGE

## 2023-10-09 RX ORDER — DEXTROSE 50 % IN WATER 50 %
25 SYRINGE (ML) INTRAVENOUS ONCE
Refills: 0 | Status: DISCONTINUED | OUTPATIENT
Start: 2023-10-09 | End: 2023-10-22

## 2023-10-09 RX ORDER — GABAPENTIN 400 MG/1
100 CAPSULE ORAL
Refills: 0 | Status: DISCONTINUED | OUTPATIENT
Start: 2023-10-11 | End: 2023-10-22

## 2023-10-09 RX ORDER — ASPIRIN/CALCIUM CARB/MAGNESIUM 324 MG
81 TABLET ORAL DAILY
Refills: 0 | Status: DISCONTINUED | OUTPATIENT
Start: 2023-10-09 | End: 2023-10-22

## 2023-10-09 RX ORDER — INSULIN LISPRO 100/ML
VIAL (ML) SUBCUTANEOUS AT BEDTIME
Refills: 0 | Status: DISCONTINUED | OUTPATIENT
Start: 2023-10-09 | End: 2023-10-22

## 2023-10-09 RX ORDER — INSULIN DETEMIR 100/ML (3)
10 INSULIN PEN (ML) SUBCUTANEOUS
Refills: 0 | DISCHARGE

## 2023-10-09 RX ORDER — GLUCAGON INJECTION, SOLUTION 0.5 MG/.1ML
1 INJECTION, SOLUTION SUBCUTANEOUS ONCE
Refills: 0 | Status: DISCONTINUED | OUTPATIENT
Start: 2023-10-09 | End: 2023-10-22

## 2023-10-09 RX ORDER — CLOPIDOGREL BISULFATE 75 MG/1
75 TABLET, FILM COATED ORAL DAILY
Refills: 0 | Status: DISCONTINUED | OUTPATIENT
Start: 2023-10-09 | End: 2023-10-22

## 2023-10-09 RX ORDER — DEXTROSE 50 % IN WATER 50 %
15 SYRINGE (ML) INTRAVENOUS ONCE
Refills: 0 | Status: DISCONTINUED | OUTPATIENT
Start: 2023-10-09 | End: 2023-10-22

## 2023-10-09 RX ORDER — ALLOPURINOL 300 MG
100 TABLET ORAL DAILY
Refills: 0 | Status: DISCONTINUED | OUTPATIENT
Start: 2023-10-09 | End: 2023-10-10

## 2023-10-09 RX ORDER — CINACALCET 30 MG/1
60 TABLET, FILM COATED ORAL DAILY
Refills: 0 | Status: DISCONTINUED | OUTPATIENT
Start: 2023-10-09 | End: 2023-10-22

## 2023-10-09 RX ORDER — CINACALCET 30 MG/1
1 TABLET, FILM COATED ORAL
Refills: 0 | DISCHARGE

## 2023-10-09 RX ORDER — DEXTROSE 50 % IN WATER 50 %
12.5 SYRINGE (ML) INTRAVENOUS ONCE
Refills: 0 | Status: DISCONTINUED | OUTPATIENT
Start: 2023-10-09 | End: 2023-10-22

## 2023-10-09 RX ORDER — GABAPENTIN 400 MG/1
1 CAPSULE ORAL
Refills: 0 | DISCHARGE

## 2023-10-09 RX ORDER — INSULIN LISPRO 100/ML
1 VIAL (ML) SUBCUTANEOUS
Refills: 0 | DISCHARGE

## 2023-10-09 RX ORDER — PANTOPRAZOLE SODIUM 20 MG/1
40 TABLET, DELAYED RELEASE ORAL
Refills: 0 | Status: DISCONTINUED | OUTPATIENT
Start: 2023-10-10 | End: 2023-10-22

## 2023-10-09 RX ORDER — ASPIRIN/CALCIUM CARB/MAGNESIUM 324 MG
1 TABLET ORAL
Refills: 0 | DISCHARGE

## 2023-10-09 RX ORDER — AMLODIPINE BESYLATE 2.5 MG/1
10 TABLET ORAL DAILY
Refills: 0 | Status: DISCONTINUED | OUTPATIENT
Start: 2023-10-09 | End: 2023-10-22

## 2023-10-09 RX ADMIN — ATORVASTATIN CALCIUM 80 MILLIGRAM(S): 80 TABLET, FILM COATED ORAL at 23:38

## 2023-10-09 NOTE — PATIENT PROFILE ADULT - NSPROHMDIABETHBA1C_GEN_A_NUR
Lipid abnormalities are improving with treatment.  Nutritional counseling was provided. and Pharmacotherapy as ordered.  Lipids will be reassessed in 3 months.   known

## 2023-10-09 NOTE — H&P ADULT - NSHPLABSRESULTS_GEN_ALL_CORE
11.7   6.88  )-----------( 293      ( 09 Oct 2023 23:07 )             38.6       10-09    138  |  94<L>  |  28<H>  ----------------------------<  162<H>  4.3   |  27  |  8.18<H>    Ca    9.2      09 Oct 2023 23:07  Phos  6.3     10-09  Mg     2.3     10-09    TPro  6.7  /  Alb  3.8  /  TBili  0.2  /  DBili  x   /  AST  17  /  ALT  <5<L>  /  AlkPhos  133<H>  10-09      PT/INR - ( 09 Oct 2023 23:07 )   PT: 11.2 sec;   INR: 0.98          PTT - ( 09 Oct 2023 23:07 )  PTT:32.0 sec          Urinalysis Basic - ( 09 Oct 2023 23:07 )    Color: x / Appearance: x / SG: x / pH: x  Gluc: 162 mg/dL / Ketone: x  / Bili: x / Urobili: x   Blood: x / Protein: x / Nitrite: x   Leuk Esterase: x / RBC: x / WBC x   Sq Epi: x / Non Sq Epi: x / Bacteria: x        EKG:

## 2023-10-09 NOTE — H&P ADULT - HISTORY OF PRESENT ILLNESS
**INCOMPLETE / IN PROGRESS NOTE**    72 yo F, residing in shelter, with PMHx of ESRD on HD via left arm fistula (MWF), HTN, DM-II, CAD s/p PCI 2017, gout, and fibroids, recently treated in August for Colitisn presented to [facility] on 10/7/23 complaining of sudden onset of lower abdominal pain at 1AM, 10/10 in severity. She endores associated palpitations but denied any dizziness, chest pain, diaphoresis, n/v/d, recent sick contacts.     Vitals in [facility] ED /92 mmHg, , RR 18, SpO2 100% on RA, T 98.1F. Labs significant for elevated BUN/Cr, AST, alk phos, and elevated troponin 0.25 > 2.75 > 3.6. CXR clear. CT A/P non significant. EKG with sinus tachycardia w possible T wave inversion in V4-V5. Pt received 4mg morphine, aspirin 325mg POx1, Plavix 300mg PO x1, initiated on hep gtt and admitted for NSTEMI.     S/p cardiac cath on 10/8/23 with Dr. Wilson revealing 3VCAD. LM minimal LI, severely calcified. mLAD 75% severly calcified, tortous. pLCx 70%, tortous. pRCA 90% at edge of previous stent, tortous.     TTE 10/1/23: LV EF 60-65%, LVH, normal wall motion. G1DD. Trace MR. Aortic valve mildly calcified.     Transferred to [facility] for rota/PCI of RCA with Dr. Wilson on Tuesday 10/10/23.    72 yo female, residing in shelter, with PMHx of ESRD on HD via left arm fistula (MWF), HTN, DM-II, CAD s/p PCI 2017, gout, and fibroids, recently treated in August for Colitis in August, presented to [facility] on 10/7/23 complaining of sudden onset of lower abdominal pain at 1AM, 10/10 in severity. She endorsed associated palpitations and vomiting but denied any dizziness, chest pain, diaphoresis, recent sick contacts.     Vitals in [facility] ED /92 mmHg, , RR 18, SpO2 100% on RA, T 98.1F. Labs significant for elevated BUN/Cr, AST, alk phos, and elevated troponin 0.25 > 2.75 > 3.6. CXR clear. CT A/P non significant. EKG with sinus tachycardia w possible T wave inversion in V4-V5. Pt received 4mg morphine, aspirin 325mg POx1, Plavix 300mg PO x1, initiated on heparin gtt and admitted for NSTEMI.     S/p cardiac cath on 10/8/23 with Dr. Wilson revealing 3VCAD. LM minimal LI, severely calcified. mLAD 75% severly calcified, tortous. pLCx 70%, tortous. pRCA 90% at edge of previous stent, tortous.     TTE 10/1/23: LV EF 60-65%, LVH, normal wall motion. G1DD. Trace MR. Aortic valve mildly calcified.     She is now transferred to [facility] for rota/PCI of RCA with Dr. Wilson on Tuesday 10/10/23.

## 2023-10-09 NOTE — H&P ADULT - NSHPREVIEWOFSYSTEMS_GEN_ALL_CORE
GENERAL, CONSTITUTIONAL : denies recent weight loss, fever, chills  EYES, VISION: denies changes in vision   EARS, NOSE, THROAT: denies hearing loss  HEART, CARDIOVASCULAR: +  palpitations. Denies chest pain, arrhythmia, SOB,  LE edema, claudication  RESPIRATORY: Denies cough, SOB, wheezing, PND, orthopnea  GASTROINTESTINAL: +  abdominal pain. Denies heartburn, bloody stool, dark tarry stool  GENITOURINARY: Denies frequent urination, urgency  MUSCULOSKELETAL: + legs neuropathy. Denies joint pain or swelling, restricted motion, musculoskeletal pain.   SKIN & INTEGUMENTARY: Denies rashes, sores, blisters, blisters, growths.  NEUROLOGICAL: Denies numbness or tingling sensations, sensation loss, burning.   PSYCHIATRIC: Denies nervousness, anxiety, depression  ENDOCRINE Denies heat or cold intolerance, excessive thirst  HEMATOLOGIC/LYMPHATIC: Denies abnormal bleeding, bleeding of any kind

## 2023-10-09 NOTE — H&P ADULT - ASSESSMENT
73F, HTN, HPL, DM-II, Gout, known CAD with prior PCI, transferred from [facility] where she was admitted with NSTEMI and underwent diagnostic cardiac catheterization for PCI roto of RCA 73F, HTN, HPL, DM-II, Gout, known CAD with prior PCI, transferred from [facility] where she was admitted with NSTEMI and underwent diagnostic cardiac catheterization for PCI roto of RCA  R/b/a/ of L/PCI d/w pt including, but not ltd to death, Mi, CVA, bleed. Pt wishes to proceed.      1. Cont ASA/Plavix  2, NPO for cath  3. Continue dialysis as per nephrology  4. Monitor CBC/lytes

## 2023-10-09 NOTE — PATIENT PROFILE ADULT - FUNCTIONAL SCREEN CURRENT LEVEL: COMMUNICATION, MLM
MD Laura Espino MA   Caller: Unspecified (Today,  8:48 AM)             Please let the patient know the CT scan showed an heterogenous mass in the tail (3.4 cm by 3.2 cm). It looks inflammatory (probable related to her pancreatitis). Will like to repeat her EUS in 1 month. I will also like to reviewed her previous CT scan (this was done in a different facility, will like a CD/DVD of the images for comparison).   Thanks   Demario Menjivar MD      Patient informed.   0 = understands/communicates without difficulty

## 2023-10-09 NOTE — H&P ADULT - NS ATTEND AMEND GEN_ALL_CORE FT
73F, HTN, HPL, DM-II, Gout, known CAD with prior PCI, transferred from [facility] where she was admitted with NSTEMI and underwent diagnostic cardiac catheterization for PCI roto of RCA  R/b/a/ of L/PCI d/w pt including, but not ltd to death, Mi, CVA, bleed. Pt wishes to proceed.      1. Cont ASA/Plavix  2, NPO for cath  3. Continue dialysis as per nephrology  4. Monitor CBC/lytes

## 2023-10-09 NOTE — PATIENT PROFILE ADULT - FALL HARM RISK - FACTORS
OOB with cane at home/Frequent toileting needed/Impaired gait/Impaired vision/IV and/or equipment tethered to patient/Poor balance/Weakness/Other

## 2023-10-09 NOTE — PATIENT PROFILE ADULT - FALL HARM RISK - HARM RISK INTERVENTIONS
Assistance with ambulation/Assistance OOB with selected safe patient handling equipment/Communicate Risk of Fall with Harm to all staff/Discuss with provider need for PT consult/Monitor gait and stability/Provide patient with walking aids - walker, cane, crutches/Reinforce activity limits and safety measures with patient and family/Sit up slowly, dangle for a short time, stand at bedside before walking/Tailored Fall Risk Interventions/Toileting schedule using arm’s reach rule for commode and bathroom/Use of alarms - bed, chair and/or voice tab/Visual Cue: Yellow wristband and red socks/Bed in lowest position, wheels locked, appropriate side rails in place/Call bell, personal items and telephone in reach/Instruct patient to call for assistance before getting out of bed or chair/Non-slip footwear when patient is out of bed/[place] to call system/Physically safe environment - no spills, clutter or unnecessary equipment/Purposeful Proactive Rounding/Room/bathroom lighting operational, light cord in reach

## 2023-10-09 NOTE — H&P ADULT - PROBLEM SELECTOR PLAN 2
ESRD, s/p HD prior to transfer  Cath on Tuesday, will get renal on board for HD post cath  Continue Cinacalcet  Lokelma prn on non HD days

## 2023-10-09 NOTE — H&P ADULT - NSICDXPASTMEDICALHX_GEN_ALL_CORE_FT
PAST MEDICAL HISTORY:  CAD (coronary artery disease)     Colitis     Diabetes mellitus     ESRD on dialysis     Gout     Hypertension     Mild hypercholesterolemia

## 2023-10-09 NOTE — H&P ADULT - NSHPPHYSICALEXAM_GEN_ALL_CORE
Appearance: Normal	  HEENT:  Normal oral mucosa, PERRL, EOMI	  Neck: Supple,  - JVD; No Carotid Bruit and 2+ pulses B/L  Cardiovascular: Normal S1 S2, No JVD, No murmurs  Respiratory: Lungs clear to auscultation, No Rales, Rhonchi, Wheezing	  Gastrointestinal:  Soft, Non-tender, + BS	  Skin: No rashes, No ecchymoses, No cyanosis  Extremities: Normal range of motion, No clubbing, cyanosis or edema  Vascular: Femoral pulses 1+ b/l without bruit, DP doppler+ b/l, PT doppler + b/l  Neurologic: Non-focal  Psychiatry: A & O x 3, Mood & affect appropriate

## 2023-10-09 NOTE — H&P ADULT - PROBLEM SELECTOR PLAN 1
Peak trop 3  S/P diagnostic cardiac catheterization with  revealing 3VCAD. LM minimal LI, severely calcified. mLAD 75% severly calcified, tortous. pLCx 70%, tortous. pRCA 90% at edge of previous stent, tortous.   NPO for PCI  Continue Aspirin and Plavix  No IVF

## 2023-10-10 ENCOUNTER — TRANSCRIPTION ENCOUNTER (OUTPATIENT)
Age: 73
End: 2023-10-10

## 2023-10-10 DIAGNOSIS — E11.9 TYPE 2 DIABETES MELLITUS WITHOUT COMPLICATIONS: ICD-10-CM

## 2023-10-10 DIAGNOSIS — G62.9 POLYNEUROPATHY, UNSPECIFIED: ICD-10-CM

## 2023-10-10 DIAGNOSIS — Z29.9 ENCOUNTER FOR PROPHYLACTIC MEASURES, UNSPECIFIED: ICD-10-CM

## 2023-10-10 LAB
A1C WITH ESTIMATED AVERAGE GLUCOSE RESULT: 5.1 % — SIGNIFICANT CHANGE UP (ref 4–5.6)
ALBUMIN SERPL ELPH-MCNC: 3.6 G/DL — SIGNIFICANT CHANGE UP (ref 3.3–5)
ALP SERPL-CCNC: 124 U/L — HIGH (ref 40–120)
ALT FLD-CCNC: <5 U/L — LOW (ref 10–45)
ANION GAP SERPL CALC-SCNC: 17 MMOL/L — SIGNIFICANT CHANGE UP (ref 5–17)
ANION GAP SERPL CALC-SCNC: 18 MMOL/L — HIGH (ref 5–17)
APTT BLD: 90.8 SEC — HIGH (ref 24.5–35.6)
AST SERPL-CCNC: 15 U/L — SIGNIFICANT CHANGE UP (ref 10–40)
BASOPHILS # BLD AUTO: 0.02 K/UL — SIGNIFICANT CHANGE UP (ref 0–0.2)
BASOPHILS NFR BLD AUTO: 0.3 % — SIGNIFICANT CHANGE UP (ref 0–2)
BILIRUB SERPL-MCNC: 0.2 MG/DL — SIGNIFICANT CHANGE UP (ref 0.2–1.2)
BLD GP AB SCN SERPL QL: NEGATIVE — SIGNIFICANT CHANGE UP
BUN SERPL-MCNC: 36 MG/DL — HIGH (ref 7–23)
BUN SERPL-MCNC: 41 MG/DL — HIGH (ref 7–23)
CALCIUM SERPL-MCNC: 9 MG/DL — SIGNIFICANT CHANGE UP (ref 8.4–10.5)
CALCIUM SERPL-MCNC: 9.1 MG/DL — SIGNIFICANT CHANGE UP (ref 8.4–10.5)
CHLORIDE SERPL-SCNC: 94 MMOL/L — LOW (ref 96–108)
CHLORIDE SERPL-SCNC: 96 MMOL/L — SIGNIFICANT CHANGE UP (ref 96–108)
CO2 SERPL-SCNC: 25 MMOL/L — SIGNIFICANT CHANGE UP (ref 22–31)
CO2 SERPL-SCNC: 26 MMOL/L — SIGNIFICANT CHANGE UP (ref 22–31)
CREAT SERPL-MCNC: 9.13 MG/DL — HIGH (ref 0.5–1.3)
CREAT SERPL-MCNC: 9.89 MG/DL — HIGH (ref 0.5–1.3)
EGFR: 4 ML/MIN/1.73M2 — LOW
EGFR: 4 ML/MIN/1.73M2 — LOW
EOSINOPHIL # BLD AUTO: 0.03 K/UL — SIGNIFICANT CHANGE UP (ref 0–0.5)
EOSINOPHIL NFR BLD AUTO: 0.4 % — SIGNIFICANT CHANGE UP (ref 0–6)
ESTIMATED AVERAGE GLUCOSE: 100 MG/DL — SIGNIFICANT CHANGE UP (ref 68–114)
GLUCOSE BLDC GLUCOMTR-MCNC: 115 MG/DL — HIGH (ref 70–99)
GLUCOSE BLDC GLUCOMTR-MCNC: 122 MG/DL — HIGH (ref 70–99)
GLUCOSE BLDC GLUCOMTR-MCNC: 132 MG/DL — HIGH (ref 70–99)
GLUCOSE BLDC GLUCOMTR-MCNC: 190 MG/DL — HIGH (ref 70–99)
GLUCOSE BLDC GLUCOMTR-MCNC: 190 MG/DL — HIGH (ref 70–99)
GLUCOSE SERPL-MCNC: 116 MG/DL — HIGH (ref 70–99)
GLUCOSE SERPL-MCNC: 125 MG/DL — HIGH (ref 70–99)
HCT VFR BLD CALC: 36.2 % — SIGNIFICANT CHANGE UP (ref 34.5–45)
HCT VFR BLD CALC: 38.3 % — SIGNIFICANT CHANGE UP (ref 34.5–45)
HCV AB S/CO SERPL IA: 0.04 S/CO — SIGNIFICANT CHANGE UP
HCV AB SERPL-IMP: SIGNIFICANT CHANGE UP
HGB BLD-MCNC: 11 G/DL — LOW (ref 11.5–15.5)
HGB BLD-MCNC: 11.4 G/DL — LOW (ref 11.5–15.5)
IMM GRANULOCYTES NFR BLD AUTO: 0.4 % — SIGNIFICANT CHANGE UP (ref 0–0.9)
LYMPHOCYTES # BLD AUTO: 0.7 K/UL — LOW (ref 1–3.3)
LYMPHOCYTES # BLD AUTO: 9 % — LOW (ref 13–44)
MAGNESIUM SERPL-MCNC: 2.3 MG/DL — SIGNIFICANT CHANGE UP (ref 1.6–2.6)
MCHC RBC-ENTMCNC: 29.8 GM/DL — LOW (ref 32–36)
MCHC RBC-ENTMCNC: 30.1 PG — SIGNIFICANT CHANGE UP (ref 27–34)
MCHC RBC-ENTMCNC: 30.3 PG — SIGNIFICANT CHANGE UP (ref 27–34)
MCHC RBC-ENTMCNC: 30.4 GM/DL — LOW (ref 32–36)
MCV RBC AUTO: 101.1 FL — HIGH (ref 80–100)
MCV RBC AUTO: 99.7 FL — SIGNIFICANT CHANGE UP (ref 80–100)
MONOCYTES # BLD AUTO: 0.5 K/UL — SIGNIFICANT CHANGE UP (ref 0–0.9)
MONOCYTES NFR BLD AUTO: 6.4 % — SIGNIFICANT CHANGE UP (ref 2–14)
NEUTROPHILS # BLD AUTO: 6.53 K/UL — SIGNIFICANT CHANGE UP (ref 1.8–7.4)
NEUTROPHILS NFR BLD AUTO: 83.5 % — HIGH (ref 43–77)
NRBC # BLD: 0 /100 WBCS — SIGNIFICANT CHANGE UP (ref 0–0)
NRBC # BLD: 0 /100 WBCS — SIGNIFICANT CHANGE UP (ref 0–0)
PHOSPHATE SERPL-MCNC: 6.8 MG/DL — HIGH (ref 2.5–4.5)
PLATELET # BLD AUTO: 266 K/UL — SIGNIFICANT CHANGE UP (ref 150–400)
PLATELET # BLD AUTO: 282 K/UL — SIGNIFICANT CHANGE UP (ref 150–400)
POTASSIUM SERPL-MCNC: 4.4 MMOL/L — SIGNIFICANT CHANGE UP (ref 3.5–5.3)
POTASSIUM SERPL-MCNC: 5 MMOL/L — SIGNIFICANT CHANGE UP (ref 3.5–5.3)
POTASSIUM SERPL-SCNC: 4.4 MMOL/L — SIGNIFICANT CHANGE UP (ref 3.5–5.3)
POTASSIUM SERPL-SCNC: 5 MMOL/L — SIGNIFICANT CHANGE UP (ref 3.5–5.3)
PROT SERPL-MCNC: 6.3 G/DL — SIGNIFICANT CHANGE UP (ref 6–8.3)
RBC # BLD: 3.63 M/UL — LOW (ref 3.8–5.2)
RBC # BLD: 3.79 M/UL — LOW (ref 3.8–5.2)
RBC # FLD: 17.9 % — HIGH (ref 10.3–14.5)
RBC # FLD: 18 % — HIGH (ref 10.3–14.5)
RH IG SCN BLD-IMP: POSITIVE — SIGNIFICANT CHANGE UP
SODIUM SERPL-SCNC: 136 MMOL/L — SIGNIFICANT CHANGE UP (ref 135–145)
SODIUM SERPL-SCNC: 140 MMOL/L — SIGNIFICANT CHANGE UP (ref 135–145)
T4 AB SER-ACNC: 7.01 UG/DL — SIGNIFICANT CHANGE UP (ref 4.5–11.7)
TSH SERPL-MCNC: 1.81 UIU/ML — SIGNIFICANT CHANGE UP (ref 0.27–4.2)
WBC # BLD: 7.22 K/UL — SIGNIFICANT CHANGE UP (ref 3.8–10.5)
WBC # BLD: 7.81 K/UL — SIGNIFICANT CHANGE UP (ref 3.8–10.5)
WBC # FLD AUTO: 7.22 K/UL — SIGNIFICANT CHANGE UP (ref 3.8–10.5)
WBC # FLD AUTO: 7.81 K/UL — SIGNIFICANT CHANGE UP (ref 3.8–10.5)

## 2023-10-10 PROCEDURE — 99152 MOD SED SAME PHYS/QHP 5/>YRS: CPT

## 2023-10-10 PROCEDURE — 92933 PRQ TRLML C ATHRC ST ANGIOP1: CPT | Mod: RC

## 2023-10-10 PROCEDURE — 92978 ENDOLUMINL IVUS OCT C 1ST: CPT | Mod: 26,RC

## 2023-10-10 PROCEDURE — 93010 ELECTROCARDIOGRAM REPORT: CPT

## 2023-10-10 PROCEDURE — 99233 SBSQ HOSP IP/OBS HIGH 50: CPT

## 2023-10-10 RX ORDER — METOPROLOL TARTRATE 50 MG
25 TABLET ORAL
Refills: 0 | Status: DISCONTINUED | OUTPATIENT
Start: 2023-10-10 | End: 2023-10-14

## 2023-10-10 RX ORDER — HEPARIN SODIUM 5000 [USP'U]/ML
INJECTION INTRAVENOUS; SUBCUTANEOUS
Qty: 25000 | Refills: 0 | Status: DISCONTINUED | OUTPATIENT
Start: 2023-10-10 | End: 2023-10-10

## 2023-10-10 RX ORDER — MORPHINE SULFATE 50 MG/1
2 CAPSULE, EXTENDED RELEASE ORAL ONCE
Refills: 0 | Status: DISCONTINUED | OUTPATIENT
Start: 2023-10-10 | End: 2023-10-10

## 2023-10-10 RX ORDER — HYDRALAZINE HCL 50 MG
50 TABLET ORAL ONCE
Refills: 0 | Status: COMPLETED | OUTPATIENT
Start: 2023-10-10 | End: 2023-10-10

## 2023-10-10 RX ORDER — HEPARIN SODIUM 5000 [USP'U]/ML
4400 INJECTION INTRAVENOUS; SUBCUTANEOUS ONCE
Refills: 0 | Status: COMPLETED | OUTPATIENT
Start: 2023-10-10 | End: 2023-10-10

## 2023-10-10 RX ORDER — HEPARIN SODIUM 5000 [USP'U]/ML
4400 INJECTION INTRAVENOUS; SUBCUTANEOUS EVERY 6 HOURS
Refills: 0 | Status: DISCONTINUED | OUTPATIENT
Start: 2023-10-10 | End: 2023-10-10

## 2023-10-10 RX ADMIN — AMLODIPINE BESYLATE 10 MILLIGRAM(S): 2.5 TABLET ORAL at 06:36

## 2023-10-10 RX ADMIN — Medication 81 MILLIGRAM(S): at 07:54

## 2023-10-10 RX ADMIN — PANTOPRAZOLE SODIUM 40 MILLIGRAM(S): 20 TABLET, DELAYED RELEASE ORAL at 06:36

## 2023-10-10 RX ADMIN — CLOPIDOGREL BISULFATE 75 MILLIGRAM(S): 75 TABLET, FILM COATED ORAL at 07:54

## 2023-10-10 RX ADMIN — MORPHINE SULFATE 2 MILLIGRAM(S): 50 CAPSULE, EXTENDED RELEASE ORAL at 21:00

## 2023-10-10 RX ADMIN — HEPARIN SODIUM 4400 UNIT(S): 5000 INJECTION INTRAVENOUS; SUBCUTANEOUS at 01:45

## 2023-10-10 RX ADMIN — CINACALCET 60 MILLIGRAM(S): 30 TABLET, FILM COATED ORAL at 17:38

## 2023-10-10 RX ADMIN — Medication 25 MILLIGRAM(S): at 17:38

## 2023-10-10 RX ADMIN — Medication 50 MILLIGRAM(S): at 02:33

## 2023-10-10 RX ADMIN — MORPHINE SULFATE 2 MILLIGRAM(S): 50 CAPSULE, EXTENDED RELEASE ORAL at 14:24

## 2023-10-10 RX ADMIN — HEPARIN SODIUM 0 UNIT(S)/HR: 5000 INJECTION INTRAVENOUS; SUBCUTANEOUS at 07:54

## 2023-10-10 RX ADMIN — MORPHINE SULFATE 2 MILLIGRAM(S): 50 CAPSULE, EXTENDED RELEASE ORAL at 20:37

## 2023-10-10 RX ADMIN — HEPARIN SODIUM 900 UNIT(S)/HR: 5000 INJECTION INTRAVENOUS; SUBCUTANEOUS at 01:46

## 2023-10-10 NOTE — DIETITIAN INITIAL EVALUATION ADULT - PERSON TAUGHT/METHOD
Attempted however assume limited based on pt current ability to engage with RD while getting RN care

## 2023-10-10 NOTE — DISCHARGE NOTE PROVIDER - CARE PROVIDER_API CALL
Piotr Wilson  Interventional Cardiology  [address], [address]  [place] [address]  Phone: [phone]  Fax: [phone]  Follow Up Time:     Clifton Larsen  Cardiology  [address] [place] [address], [address]  [place] [address]  Phone: [phone]  Fax: [phone]  Follow Up Time:

## 2023-10-10 NOTE — DISCHARGE NOTE PROVIDER - NSDCCPCAREPLAN_GEN_ALL_CORE_FT
PRINCIPAL DISCHARGE DIAGNOSIS  Diagnosis: NSTEMI (non-ST elevation myocardial infarction)  Assessment and Plan of Treatment: You were found to have blockages in the arteries of your heart, also known as Coronary Artery Disease. You underwent a cardiac catheterization on 10/10/23 and received a stent to the RCA artery.  PLEASE CONTINUE ASPIRIN 81MG DAILY AND PLAVIX 75MG DAILY. DO NOT STOP THESE MEDICATIONS FOR ANY REASON AS THEY ARE KEEPING YOUR STENT OPEN AND PREVENTING A HEART ATTACK.   Aspirin and Plavix can put you at increased risk of bleeding; please avoid NSAIDS (such as Motrin, Advil, Ibuprofen, Naproxen, or Aleve, as these medications may further your risk of bleeding  Avoid strenuous activity or heavy lifting anything more than 5lbs for the next five days. Do not take a bath or swim for the next five days; you may shower. For any bleeding or hematoma formation (hardened blood collection under the skin) at the access site of both of your groins please hold pressure and go to the emergency room.   Please follow up with  ___ in 1-2 weeks. For recurrent chest pain, please call your doctor or go to the emergency room.  We have provided you with a prescription for cardiac rehab which is medically supervised exercise program for your heart and has been shown to improve the quantity and quality of life of people with heart disease like yours. You should attend cardiac rehab 3 times per week for 12 weeks. We have provided you with a list of nearby facilities. Please call your insurance carrier to determine which of these facilities are covered under your plan. Please bring this prescription with you to your follow up appointment with your cardiologist who can then further assist you to enroll into a cardiac rehab program.      SECONDARY DISCHARGE DIAGNOSES  Diagnosis: ESRD on dialysis  Assessment and Plan of Treatment: End-Stage Renal Disease (ESRD) is a medical condition in which a person's kidneys cease functioning on a permanent basis leading to the need for a regular course of long-term dialysis or a kidney transplant to maintain life. You are currently on dialysis as a result of your ESRD. Dialysis is a treatment that uses a machine to clean your blood and remove fluid from your body. You are to resume your dialysis schedule of [facility].   Please follow-up with your Nephrologist ____ in 1-2 weeks.    Diagnosis: HTN (hypertension)  Assessment and Plan of Treatment: Please continue Metoprolol tartrate 25mg twice daily and Amlodipine 10mg daily as listed to keep your blood pressure controlled. For blood pressure that is too high or too low please see your doctor or go to the emergency room as necessary.      Diagnosis: Type 2 diabetes mellitus  Assessment and Plan of Treatment: Your Hemoglobin A1c is 5.1 and your goal A1c is less than 7.0%. This number measures your average blood sugar level over the last three months.  Please continue your Lispro 1 unit three times daily and Detemir 10 units nightly as listed for diabetes. Maintain a low carbohydrate, low sugar diet, exercise, monitor your fingerstick blood sugars regarly and follow up with your Endocrinologist/Primary Care Doctor.    Diagnosis: Peripheral neuropathy  Assessment and Plan of Treatment: Peripheral neuropathy happens when the nerves that are located outside of the brain and spinal cord (peripheral nerves) are damaged. This condition often causes weakness, numbness and pain, usually in the hands and feet.  Please continue to take Gabapentin 100mg three times daily as listed. Please follow-up with your PCP.     PRINCIPAL DISCHARGE DIAGNOSIS  Diagnosis: NSTEMI (non-ST elevation myocardial infarction)  Assessment and Plan of Treatment: You were found to have blockages in the arteries of your heart, also known as Coronary Artery Disease. You underwent a cardiac catheterization on 10/10/23 and received a stent to the RCA artery.  PLEASE CONTINUE ASPIRIN 81MG DAILY, PLAVIX 75MG DAILY and ATORVASTATIN 80MG DAILY. DO NOT STOP THESE MEDICATIONS FOR ANY REASON AS THEY ARE KEEPING YOUR STENT OPEN AND PREVENTING A HEART ATTACK.   Aspirin and Plavix can put you at increased risk of bleeding; please avoid NSAIDS (such as Motrin, Advil, Ibuprofen, Naproxen, or Aleve, as these medications may further your risk of bleeding  Please follow up with Dr. Wilson in 1-2 weeks to schedule a time for another CARDIAC CATHERTERIZATION. For recurrent chest pain, please call your doctor or go to the emergency room.      SECONDARY DISCHARGE DIAGNOSES  Diagnosis: ESRD on dialysis  Assessment and Plan of Treatment: End-Stage Renal Disease (ESRD) is a medical condition in which a person's kidneys cease functioning on a permanent basis leading to the need for a regular course of long-term dialysis or a kidney transplant to maintain life. You are currently on dialysis as a result of your ESRD. Dialysis is a treatment that uses a machine to clean your blood and remove fluid from your body. You are to resume your dialysis schedule of [facility].   Please follow-up with your Nephrologist in 1-2 weeks.    Diagnosis: HTN (hypertension)  Assessment and Plan of Treatment: Please continue Carvedilol 25mg twice daily and Amlodipine 10mg daily as listed to keep your blood pressure controlled. For blood pressure that is too high or too low please see your doctor or go to the emergency room as necessary.      Diagnosis: Type 2 diabetes mellitus  Assessment and Plan of Treatment: Your Hemoglobin A1c is 5.1 and your goal A1c is less than 7.0%. This number measures your average blood sugar level over the last three months.  Please continue your Lispro 1 unit three times daily and Detemir 10 units nightly as listed for diabetes. Maintain a low carbohydrate, low sugar diet, exercise, monitor your fingerstick blood sugars regarly and follow up with your Endocrinologist/Primary Care Doctor.    Diagnosis: Peripheral neuropathy  Assessment and Plan of Treatment: Peripheral neuropathy happens when the nerves that are located outside of the brain and spinal cord (peripheral nerves) are damaged. This condition often causes weakness, numbness and pain, usually in the hands and feet.  Please continue to take Gabapentin 100mg three times daily as listed. Please follow-up with your PCP.

## 2023-10-10 NOTE — PROGRESS NOTE ADULT - PROBLEM SELECTOR PLAN 1
- DAPT: ASA 81mg + Plavix 75mg   - Continue Atorvastatin 80mg QHS   - [facility] ([facility]): 3vCAD with mLAD 75%, pLCx 70%, and tortous pRCA 90%; s/p Heparin gtt for ACS coverage   - [facility] (10/10/23): Rota/JAMIL x1 to p-mRCA (90% ISR), LAD 75%. Plan for Staged PCI of residual LAD lesion in 5 weeks   - [facility] (10/01/23): LV EF 60-65%, LVH, normal wall motion. G1DD. Trace MR. Aortic valve mildly calcified

## 2023-10-10 NOTE — DIETITIAN INITIAL EVALUATION ADULT - PERTINENT MEDS FT
MEDICATIONS  (STANDING):  amLODIPine   Tablet 10 milliGRAM(s) Oral daily  aspirin enteric coated 81 milliGRAM(s) Oral daily  atorvastatin 80 milliGRAM(s) Oral at bedtime  cinacalcet 60 milliGRAM(s) Oral daily  clopidogrel Tablet 75 milliGRAM(s) Oral daily  dextrose 5%. 1000 milliLiter(s) (100 mL/Hr) IV Continuous <Continuous>  dextrose 5%. 1000 milliLiter(s) (50 mL/Hr) IV Continuous <Continuous>  dextrose 50% Injectable 25 Gram(s) IV Push once  dextrose 50% Injectable 25 Gram(s) IV Push once  dextrose 50% Injectable 12.5 Gram(s) IV Push once  gabapentin 100 milliGRAM(s) Oral <User Schedule>  glucagon  Injectable 1 milliGRAM(s) IntraMuscular once  insulin lispro (ADMELOG) corrective regimen sliding scale   SubCutaneous three times a day before meals  insulin lispro (ADMELOG) corrective regimen sliding scale   SubCutaneous at bedtime  metoprolol tartrate 25 milliGRAM(s) Oral two times a day  pantoprazole    Tablet 40 milliGRAM(s) Oral before breakfast    MEDICATIONS  (PRN):  dextrose Oral Gel 15 Gram(s) Oral once PRN Blood Glucose LESS THAN 70 milliGRAM(s)/deciliter

## 2023-10-10 NOTE — CHART NOTE - NSCHARTNOTEFT_GEN_A_CORE
Called to patient bedside by RN for left groin ooze s/p cardiac cath. Patient noted with left groin hematoma superior to access site 7 F sheath in place (Perclose failed secondary to calcification) . Manual compression held by PAs ~ 1 hour while sheath in place as sheath not due to be pulled. VSS remained stable throughout. Sheath pulled without issue however required prolonged hold due to prolonged bleeding. Case discussed with Dr. Wilson and Dr. Alatorre. STAT CBC and Type and screen ordered and will continue to monitor access sites closely. Patient hypertensive prior to procedure and intraprocedure (160-190s/70-80s) treated with Labetalol 10 mg IV x 1 and Hydralazine 10 mg IV x 1. Patient is ESRD on HD M,W,F, planned for HD today however given hematoma and prolonged bleeding from access site will hold off on HD at this time to ensure HD stability prior to HD session. Called to patient bedside by RN ~12:50 pm for left groin ooze s/p cardiac cath. Patient noted with left groin hematoma superior to access site 7 F sheath in place (Perclose failed secondary to calcification) . Manual compression held by PAs ~ 1 hour while sheath in place as sheath not due to be pulled. VSS remained stable throughout. Sheath pulled without issue however required prolonged hold due to prolonged bleeding. Case discussed with Dr. Wilson and Dr. Alatorre. STAT CBC and Type and screen ordered and will continue to monitor access sites closely. Patient hypertensive prior to procedure and intraprocedure (160-190s/70-80s) treated with Labetalol 10 mg IV x 1 and Hydralazine 10 mg IV x 1. Patient is ESRD on HD M,W,F, planned for HD today however given hematoma and prolonged bleeding from access site will hold off on HD at this time to ensure HD stability prior to HD session. Called to patient bedside by RN ~12:50 pm for left groin ooze s/p cardiac cath. Patient noted with left groin hematoma superior to access site 7 F sheath in place (Perclose failed secondary to calcification) . Manual compression held by PAs ~ 1 hour while sheath in place as sheath not due to be pulled. VSS remained stable throughout. Sheath pulled without issue however required prolonged hold due to prolonged bleeding. Case discussed with Dr. Wilson and Dr. Alatorre. STAT CBC and Type and screen ordered and will continue to monitor access sites closely. Patient hypertensive prior to procedure and intraprocedure (160-190s/70-80s) treated with Labetalol 10 mg IV x 1 and Hydralazine 10 mg IV x 1. Patient is ESRD on HD M,W,F, planned for HD today however given hematoma and prolonged bleeding from access site will hold off on HD at this time to ensure HD stability prior to HD session.    PA Addendum- 6 PM  Repeat CBC stable. BP remains stable 130's/60s and bilateral groins stable with no hematoma or bleed. Patient noted to be tachycardic -110s however was tachycardic intraprocedure. Tachycardia may be secondary to Hydralazine (rebound tachycardia) . Continue to monitor. Plan to initiate BB given CAD and NSTEMI. Dr. Wilson and Dr. Alatorre updated on patient status. Called to patient bedside by RN ~12:50 pm for left groin ooze s/p cardiac cath. Patient noted with left groin hematoma superior to access site 7 F sheath in place (Perclose failed secondary to calcification) . Manual compression held by PAs ~ 1 hour while sheath in place as sheath not due to be pulled. VSS remained stable throughout. Sheath pulled without issue however required prolonged hold due to prolonged bleeding. Case discussed with Dr. Wilson and Dr. Alatorre. STAT CBC and Type and screen ordered and will continue to monitor access sites closely. Patient hypertensive prior to procedure and intraprocedure (160-190s/70-80s) treated with Labetalol 10 mg IV x 1 and Hydralazine 10 mg IV x 1. Patient is ESRD on HD M,W,F, planned for HD today however given hematoma and prolonged bleeding from access site will hold off on HD at this time to ensure HD stability prior to HD session.    PA Addendum- 6 PM  Repeat CBC stable. BP remains stable 130's/60s and bilateral groins stable with no hematoma or bleed. Patient noted to be tachycardic -110s however was tachycardic intraprocedure. Tachycardia may be secondary to Hydralazine (rebound tachycardia) . Continue to monitor. Initial Plan to initiate BB given CAD and NSTEMI however will hold off at this time and consider initiating tomorrow pending BP, HR trends as patient is planned for HD tomorrow as well . Dr. Wilson and Dr. Alatorre updated on patient status.

## 2023-10-10 NOTE — DIETITIAN INITIAL EVALUATION ADULT - OTHER INFO
74 yo female, with PMHx of ESRD/HD via left arm fistula (MWF), HTN, DM-II, CAD s/p PCI 2017, gout, and fibroids, recently treated in August for Colitis in August, [place] [facility] [place] complaining of sudden onset of lower abdominal pain at 1AM, 10/10 in severity. She endorsed associated palpitations and vomiting but denied any dizziness, chest pain, diaphoresis, recent sick contacts. Found to have NSTEMI. Pt S/p cardiac cath 10/8/23 with Dr. Wilson revealing 3VCAD. LM minimal LI, severely calcified. mLAD 75% severly calcified, tortous. pLCx 70%, tortous. pRCA 90% at edge of previous stent, tortous. She is now transferred to [facility] for rota/PCI of RCA with Dr. Wilson on Tuesday 10/10/23. Pt with left groin ooze s/p cardiac cath today; found with hematoma superior to access site 7 F sheath in place.     Pt seen this afternoon on 5UR. RN drawing blood. Limited RD visit at this time. Noted PTA pt is a residing in shelter. ? Access to food sources PTA. When asked about %PO PTA, pt reported decreased PO intake d/t being sick x 1 week. Pt has taken PO supplements prior however not taking nepro d/t reported cost. When asked about wt loss i/s/o of decreased PO intake pt reported "it doesnt matter." Noted admit wt 163 pounds. No prior RD notes to pull wts from. Unable to preform NFPE d/t ongoing RN care. Diet: DASH. RN reports pt has not yet eaten d/t need to lay flat s/p Cath. Protonix ordered. No pain. Camilo 17. No Edema. Had been noted with a pressure ulcer bundle however is now d/c. A1c 5.8%, potassium 4.4, phosphorus 6.8.   Please see below for nutritions recommendations.

## 2023-10-10 NOTE — PROGRESS NOTE ADULT - SUBJECTIVE AND OBJECTIVE BOX
CARDIOLOGY PA PROGRESS NOTE    Interval Events:  - Transferred from [facility] [place] yesterday for NSTEMI and possible staged PCI. Continued on Heparin gtt for ACS coverage   - Plan for [facility] 10/10/23    Overnight Events: PREMA    Subjective:  - Pt seen and examined this AM sitting comfortably in bed. Feels well; no acute complaints of CP, palpitations, SOB, abdominal discomfort, N/V/D    ROS: Negative except per HPI and Subjective     Tele: NSR    MEDICATIONS:  amLODIPine   Tablet 10 milliGRAM(s) Oral daily  metoprolol tartrate 25 milliGRAM(s) Oral two times a day  gabapentin 100 milliGRAM(s) Oral <User Schedule>  pantoprazole    Tablet 40 milliGRAM(s) Oral before breakfast  atorvastatin 80 milliGRAM(s) Oral at bedtime  cinacalcet 60 milliGRAM(s) Oral daily  dextrose 50% Injectable 25 Gram(s) IV Push once  dextrose 50% Injectable 25 Gram(s) IV Push once  dextrose 50% Injectable 12.5 Gram(s) IV Push once  dextrose Oral Gel 15 Gram(s) Oral once PRN  glucagon  Injectable 1 milliGRAM(s) IntraMuscular once  insulin lispro (ADMELOG) corrective regimen sliding scale   SubCutaneous at bedtime  insulin lispro (ADMELOG) corrective regimen sliding scale   SubCutaneous three times a day before meals    aspirin enteric coated 81 milliGRAM(s) Oral daily  clopidogrel Tablet 75 milliGRAM(s) Oral daily  dextrose 5%. 1000 milliLiter(s) IV Continuous <Continuous>  dextrose 5%. 1000 milliLiter(s) IV Continuous <Continuous>    	  VITAL SIGNS:  T(C): 36.4 (10-10-23 @ 06:34), Max: 36.4 (10-10-23 @ 06:34)  HR: 104 (10-10-23 @ 12:00) (90 - 104)  BP: 165/68 (10-10-23 @ 12:00) (142/63 - 186/81)  RR: 18 (10-10-23 @ 12:00) (17 - 18)  SpO2: 95% (10-10-23 @ 12:00) (95% - 98%)    I&O's Summary    09 Oct 2023 07:01  -  10 Oct 2023 07:00  --------------------------------------------------------  IN: 0 mL / OUT: 0 mL / NET: 0 mL    10 Oct 2023 07:01  -  10 Oct 2023 15:31  --------------------------------------------------------  IN: 0 mL / OUT: 0 mL / NET: 0 mL      Height (cm): 165.1 (10-09 @ 22:00)  Weight (kg): 74.2 (10-09 @ 22:00)  BMI (kg/m2): 27.2 (10-09 @ 22:00)  BSA (m2): 1.82 (10-09 @ 22:00)                                     PHYSICAL EXAM:  HENT: NCAT, EOMI, PEERLA  CV: RRR, S1/S2. No JVD. No murmurs, rubs or gallops   PULM: CTA B/L. No wheezing, rales, or rhonchi   ABD: Soft, NT/ND, +BS throughout   EXT: Warm, edema  VASC:   NEURO: AOx3; no focal neuro deficits      LABS:	 	                                  11.4   7.22  )-----------( 282      ( 10 Oct 2023 05:30 )             38.3     10-10    140  |  96  |  36<H>  ----------------------------<  116<H>  4.4   |  26  |  9.13<H>    Ca    9.0      10 Oct 2023 05:30  Phos  6.8     10-10  Mg     2.3     10-10    TPro  6.3  /  Alb  3.6  /  TBili  0.2  /  DBili  x   /  AST  15  /  ALT  <5<L>  /  AlkPhos  124<H>  10-10    proBNP:   Lipid Profile:   HgA1c:   TSH: Thyroid Stimulating Hormone, Serum: 1.810 uIU/mL (10-10 @ 05:30)    PT/INR - ( 09 Oct 2023 23:07 )   PT: 11.2 sec;   INR: 0.98          PTT - ( 10 Oct 2023 05:30 )  PTT:90.8 sec    IMAGING/DATA:   CARDIOLOGY PA PROGRESS NOTE    Interval Events:  - [place] [facility] [place] and possible staged PCI. Continued on Heparin gtt for ACS coverage   - Plan for [facility] 10/10/23    Overnight Events: PREMA    Subjective:  - Pt seen and examined this AM sitting comfortably in bed. Feels well; no acute complaints of CP, palpitations, SOB, abdominal discomfort, N/V/D    ROS: Negative except per HPI and Subjective     Tele: NSR    MEDICATIONS:  amLODIPine   Tablet 10 milliGRAM(s) Oral daily  metoprolol tartrate 25 milliGRAM(s) Oral two times a day  gabapentin 100 milliGRAM(s) Oral <User Schedule>  pantoprazole    Tablet 40 milliGRAM(s) Oral before breakfast  atorvastatin 80 milliGRAM(s) Oral at bedtime  cinacalcet 60 milliGRAM(s) Oral daily  dextrose 50% Injectable 25 Gram(s) IV Push once  dextrose 50% Injectable 25 Gram(s) IV Push once  dextrose 50% Injectable 12.5 Gram(s) IV Push once  dextrose Oral Gel 15 Gram(s) Oral once PRN  glucagon  Injectable 1 milliGRAM(s) IntraMuscular once  insulin lispro (ADMELOG) corrective regimen sliding scale   SubCutaneous at bedtime  insulin lispro (ADMELOG) corrective regimen sliding scale   SubCutaneous three times a day before meals  aspirin enteric coated 81 milliGRAM(s) Oral daily  clopidogrel Tablet 75 milliGRAM(s) Oral daily  dextrose 5%. 1000 milliLiter(s) IV Continuous <Continuous>  dextrose 5%. 1000 milliLiter(s) IV Continuous <Continuous>    	  VITAL SIGNS:  T(C): 36.4 (10-10-23 @ 06:34), Max: 36.4 (10-10-23 @ 06:34)  HR: 104 (10-10-23 @ 12:00) (90 - 104)  BP: 165/68 (10-10-23 @ 12:00) (142/63 - 186/81)  RR: 18 (10-10-23 @ 12:00) (17 - 18)  SpO2: 95% (10-10-23 @ 12:00) (95% - 98%)    I&O's Summary    09 Oct 2023 07:01  -  10 Oct 2023 07:00  --------------------------------------------------------  IN: 0 mL / OUT: 0 mL / NET: 0 mL    10 Oct 2023 07:01  -  10 Oct 2023 15:31  --------------------------------------------------------  IN: 0 mL / OUT: 0 mL / NET: 0 mL      Height (cm): 165.1 (10-09 @ 22:00)  Weight (kg): 74.2 (10-09 @ 22:00)  BMI (kg/m2): 27.2 (10-09 @ 22:00)  BSA (m2): 1.82 (10-09 @ 22:00)                                     PHYSICAL EXAM:  HENT: NCAT, EOMI, PEERLA  CV: RRR, S1/S2. No JVD. No murmurs, rubs or gallops   PULM: CTA B/L. No wheezing, rales, or rhonchi   ABD: Soft, NT/ND, +BS throughout   EXT: Warm, no LE edema  NEURO: AOx3; no focal neuro deficits    LABS:	 	               11.4   7.22  )-----------( 282      ( 10 Oct 2023 05:30 )             38.3     10-10    140  |  96  |  36<H>  ----------------------------<  116<H>  4.4   |  26  |  9.13<H>    Ca    9.0      10 Oct 2023 05:30  Phos  6.8     10-10  Mg     2.3     10-10    TPro  6.3  /  Alb  3.6  /  TBili  0.2  /  DBili  x   /  AST  15  /  ALT  <5<L>  /  AlkPhos  124<H>  10-10      TSH: Thyroid Stimulating Hormone, Serum: 1.810 uIU/mL (10-10 @ 05:30)    PT/INR - ( 09 Oct 2023 23:07 )   PT: 11.2 sec;   INR: 0.98          PTT - ( 10 Oct 2023 05:30 )  PTT:90.8 sec

## 2023-10-10 NOTE — DIETITIAN INITIAL EVALUATION ADULT - OTHER CALCULATIONS
5'5''  pounds +-10%  Wt 163 pounds BMI 27.1 %JTJ=[id number]  IBW used to calculate energy needs due to pt's current body weight exceeding 120% of IBW  Adjust for age, ESRD/HD; fluids per team (or 1000+UOP)

## 2023-10-10 NOTE — DISCHARGE NOTE PROVIDER - NSDCMRMEDTOKEN_GEN_ALL_CORE_FT
allopurinol 100 mg oral tablet: 1 tab(s) orally once a day  amLODIPine 10 mg oral tablet: 1 tab(s) orally once a day  aspirin 81 mg oral delayed release tablet: 1 tab(s) orally once a day  cinacalcet 60 mg oral tablet: 1 tab(s) orally once a day  gabapentin 100 mg oral capsule: 1 cap(s) orally 3 times a day  hydrALAZINE 100 mg oral tablet: 1 tab(s) orally 2 times a day  hydrALAZINE 50 mg oral tablet: 1 tab(s) orally 3 times a day  insulin detemir 100 units/mL subcutaneous solution: 10 unit(s) subcutaneous once a day (at bedtime)  insulin lispro 100 units/mL injectable solution: 1 unit(s) injectable 3 times a day  Lipitor 80 mg oral tablet: 1 tab(s) orally once a day (at bedtime)  Lokelma 10 g oral powder for reconstitution: 10 gram(s) orally 3 times a week  pantoprazole 20 mg oral delayed release tablet: 1 tab(s) orally once a day  pantoprazole 40 mg oral delayed release tablet: 1 tab(s) orally once a day   allopurinol 100 mg oral tablet: 1 tab(s) orally once a day  allopurinol 100 mg oral tablet: 1 tab(s) orally once a day  amLODIPine 10 mg oral tablet: 1 tab(s) orally once a day  amLODIPine 10 mg oral tablet: 1 tab(s) orally once a day  aspirin 81 mg oral delayed release tablet: 1 tab(s) orally once a day  aspirin 81 mg oral tablet, chewable: 1 tab(s) orally once a day  atorvastatin 80 mg oral tablet: 1 tab(s) orally once a day (at bedtime)  cinacalcet 60 mg oral tablet: 1 tab(s) orally once a day  Cipro 250 mg oral tablet: 1 tab(s) orally once a day  gabapentin 100 mg oral capsule: 1 cap(s) orally 3 times a day  gabapentin 100 mg oral capsule: 1 cap(s) orally 3 times a day  hydrALAZINE 100 mg oral tablet: 1 tab(s) orally 2 times a day  hydrALAZINE 50 mg oral tablet: 1 tab(s) orally every 12 hours  hydrALAZINE 50 mg oral tablet: 1 tab(s) orally 3 times a day  insulin detemir 100 units/mL subcutaneous solution: 10 unit(s) subcutaneous once a day (at bedtime)  insulin lispro 100 units/mL injectable solution: 1 unit(s) injectable 3 times a day  Insulin Pen Needles, 4mm: 1 application subcutaneously 4 times a day. ** Use with insulin pen **  Lipitor 80 mg oral tablet: 1 tab(s) orally once a day (at bedtime)  Lokelma 10 g oral powder for reconstitution: 10 gram(s) orally 3 times a week  metroNIDAZOLE 500 mg oral tablet: 1 tab(s) orally 3 times a day  NovoLOG FlexPen 100 units/mL injectable solution: 1 unit(s) injectable 3 times a day (with meals)  pantoprazole 20 mg oral delayed release tablet: 1 tab(s) orally once a day  pantoprazole 40 mg oral delayed release tablet: 1 tab(s) orally once a day  pantoprazole 40 mg oral delayed release tablet: 1 tab(s) orally once a day (before a meal)  Renvela 800 mg oral tablet: 1 tab(s) orally 3 times a day  semaglutide 0.25 mg/0.5 mL (0.25 mg dose) subcutaneous solution: 0.25 milligram(s) subcutaneously every 7 days  sensipar: 60 mg po daily  Tylenol 325 mg oral capsule: 2 cap(s) orally every 6 hours   allopurinol 100 mg oral tablet: 1 tab(s) orally once a day  amLODIPine 10 mg oral tablet: 1 tab(s) orally once a day  aspirin 81 mg oral delayed release tablet: 1 tab(s) orally once a day  cinacalcet 60 mg oral tablet: 1 tab(s) orally once a day  Cipro 250 mg oral tablet: 1 tab(s) orally once a day  clopidogrel 75 mg oral tablet: 1 tab(s) orally once a day  Coreg 25 mg oral tablet: 1 tab(s) orally every 12 hours  gabapentin 100 mg oral capsule: 1 cap(s) orally 3 times a day  hydrALAZINE 50 mg oral tablet: 1 tab(s) orally 3 times a day  insulin detemir 100 units/mL subcutaneous solution: 10 unit(s) subcutaneous once a day (at bedtime)  insulin lispro 100 units/mL injectable solution: 1 unit(s) injectable 3 times a day  Insulin Pen Needles, 4mm: 1 application subcutaneously 4 times a day. ** Use with insulin pen **  Lipitor 80 mg oral tablet: 1 tab(s) orally once a day (at bedtime)  Lokelma 10 g oral powder for reconstitution: 10 gram(s) orally 3 times a week  metroNIDAZOLE 500 mg oral tablet: 1 tab(s) orally 3 times a day  NovoLOG FlexPen 100 units/mL injectable solution: 1 unit(s) injectable 3 times a day (with meals)  pantoprazole 40 mg oral delayed release tablet: 1 tab(s) orally once a day  Renvela 800 mg oral tablet: 1 tab(s) orally 3 times a day  semaglutide 0.25 mg/0.5 mL (0.25 mg dose) subcutaneous solution: 0.25 milligram(s) subcutaneously every 7 days  sensipar: 60 mg po daily  Tylenol 325 mg oral capsule: 2 cap(s) orally every 6 hours   allopurinol 100 mg oral tablet: 1 tab(s) orally once a day  amLODIPine 10 mg oral tablet: 1 tab(s) orally once a day  aspirin 81 mg oral delayed release tablet: 1 tab(s) orally once a day  cinacalcet 60 mg oral tablet: 1 tab(s) orally once a day  Cipro 250 mg oral tablet: 1 tab(s) orally once a day  clopidogrel 75 mg oral tablet: 1 tab(s) orally once a day  Coreg 25 mg oral tablet: 1 tab(s) orally every 12 hours  gabapentin 100 mg oral capsule: 1 cap(s) orally 3 times a day  hydrALAZINE 50 mg oral tablet: 1 tab(s) orally 3 times a day  insulin detemir 100 units/mL subcutaneous solution: 10 unit(s) subcutaneous once a day (at bedtime)  insulin lispro 100 units/mL injectable solution: 1 unit(s) injectable 3 times a day  Insulin Pen Needles, 4mm: 1 application subcutaneously 4 times a day. ** Use with insulin pen **  Lipitor 80 mg oral tablet: 1 tab(s) orally once a day (at bedtime)  Lokelma 10 g oral powder for reconstitution: 10 gram(s) orally 3 times a week  metroNIDAZOLE 500 mg oral tablet: 1 tab(s) orally 3 times a day  NovoLOG FlexPen 100 units/mL injectable solution: 1 unit(s) injectable 3 times a day (with meals)  pantoprazole 40 mg oral delayed release tablet: 1 tab(s) orally once a day  Renvela 800 mg oral tablet: 1 tab(s) orally 3 times a day  semaglutide 0.25 mg/0.5 mL (0.25 mg dose) subcutaneous solution: 0.25 milligram(s) subcutaneously every 7 days  sensipar: 60 mg po daily  simethicone 80 mg oral tablet, chewable: 1 tab(s) orally 2 times a day  Tylenol 325 mg oral capsule: 2 cap(s) orally every 6 hours

## 2023-10-10 NOTE — PROGRESS NOTE ADULT - PROBLEM SELECTOR PLAN 2
- via LUE AVF  - - HD via LUE AVF  - Continue Cinacalcet 60mg QD   - Renal following, appreciate recs

## 2023-10-10 NOTE — DISCHARGE NOTE PROVIDER - ATTENDING DISCHARGE PHYSICAL EXAMINATION:
74 yo female, residing in shelter, with PMHx of ESRD on HD via left arm fistula (MWF), HTN, DM-II, CAD s/p PCI 2017, gout, and fibroids, recently treated in August for Colitis in August, [place] [facility] [place] complaining of sudden onset of lower abdominal pain at 1AM, 10/10 in severity. She endorsed associated palpitations and vomiting but denied any dizziness, chest pain, diaphoresis, recent sick contacts.     1. CAD  S/p cardiac cath on 10/8/23 with Dr. Wilson revealing 3VCAD. LM minimal LI, severely calcified. mLAD 75% severly calcified, tortous. pLCx 70%, tortous. pRCA 90% at edge of previous stent, tortous. Now s/p -mRCA 90% ISR s/p Rota/JAMIL x1 and residual LAD 75%. Post-cath course c/b Lt groin hematoma and oozing requiring multiple manual pressures; repeat labs with stable Hb 10.6.  Discharge on aspirin, plavix, atorvastatin and metoprolol.

## 2023-10-10 NOTE — DISCHARGE NOTE PROVIDER - HOSPITAL COURSE
73F with hx of HTN, HLD, DM2, CAD, presenting as a transfer from [facility] s/p LHC with 3vCAD with mLAD 75%, pLCx 70%, and tortous pRCA 90%. Pt was then transferred to [facility] for further management and possible staged PCI.     On 10/10/23, pt underwent LHC with p-mRCA 90% ISR s/p Rota/JAMIL x1 and residual LAD 75%. Post-cath course c/b Lt groin hematoma and oozing requiring multiple manual pressures; repeat CBC with Hgb ___.       CAD REGIMEN UPON DISCHARGE  DAPT: ASA 81mg QD + Plavix 75mg QD  Atorvastatin 80mg QHS   Metoprolol tartrate 25mg BID     Cardiac Rehab (STEMI/NSTEMI/ACS/Unstable Angina/CHF/Post PCI):            *Education on benefits of Cardiac Rehab provided to patient: Yes/No         *Referral and Prescription Given for Cardiac Rehab : Yes/No.  If No, Why Not?         *Pt given list of locations & instructed to contact their insurance company to review list of participating providers       73F with hx of HTN, HLD, DM2, CAD, presenting as a transfer from [facility] s/p LHC with 3vCAD with mLAD 75%, pLCx 70%, and tortous pRCA 90%. Pt was then transferred to [facility] for further management and possible staged PCI.     On 10/10/23, pt underwent LHC with p-mRCA 90% ISR s/p Rota/JAMIL x1 and residual LAD 75%. Post-cath course c/b Lt groin hematoma and oozing requiring multiple manual pressures; repeat CBC with Hgb 11. Pt underwent HD session on 10/11/23.     Pt seen and examined at bedside this AM without any complaints or events overnight, VSS, labs and telemetry reviewed and pt stable for discharge as discussed with Dr. Alatorre. Pt has received appropriate discharge instructions, including medication regimen, access site management and follow up with  __ in 1-2 weeks.      CAD REGIMEN UPON DISCHARGE  DAPT: ASA 81mg QD + Plavix 75mg QD  Atorvastatin 80mg QHS   Metoprolol tartrate 25mg BID     Cardiac Rehab (STEMI/NSTEMI/ACS/Unstable Angina/CHF/Post PCI):            *Education on benefits of Cardiac Rehab provided to patient: Yes/No         *Referral and Prescription Given for Cardiac Rehab : Yes/No.  If No, Why Not?         *Pt given list of locations & instructed to contact their insurance company to review list of participating providers       73F with hx of HTN, HLD, DM2, CAD, presenting as a transfer from [facility] s/p LHC with 3vCAD with mLAD 75%, pLCx 70%, and tortous pRCA 90%. Pt was then transferred to [facility] for further management and possible staged PCI.     On 10/10/23, pt underwent LHC with p-mRCA 90% ISR s/p Rota/JAMIL x1 and residual LAD 75%. Post-cath course c/b Lt groin hematoma and oozing requiring multiple manual pressures; repeat CBC with Hgb 11. Pt underwent HD session on 10/11/23.     Pt seen and examined at bedside this AM without any complaints or events overnight, VSS, labs and telemetry reviewed and pt stable for discharge as discussed with  ___. Pt has received appropriate discharge instructions, including medication regimen, access site management and follow up with Dr. Larsen on 10/19/23 at 2:45pm.       CAD REGIMEN UPON DISCHARGE  DAPT: ASA 81mg QD + Plavix 75mg QD  Atorvastatin 80mg QHS   Metoprolol tartrate 25mg BID     Cardiac Rehab (STEMI/NSTEMI/ACS/Unstable Angina/CHF/Post PCI):            *Education on benefits of Cardiac Rehab provided to patient: Yes/No         *Referral and Prescription Given for Cardiac Rehab : Yes/No.  If No, Why Not?         *Pt given list of locations & instructed to contact their insurance company to review list of participating providers       73F with hx of HTN, HLD, DM2, CAD, presenting as a transfer from [facility] s/p LHC with 3vCAD with mLAD 75%, pLCx 70%, and tortous pRCA 90%. Pt was then transferred to [facility] for further management and possible staged PCI.     On 10/10/23, pt underwent LHC with p-mRCA 90% ISR s/p Rota/JAMIL x1 and residual LAD 75%. Post-cath course c/b Lt groin hematoma and oozing requiring multiple manual pressures; repeat CBC with Hgb 11. Pt underwent HD session on 10/11/23.     Found to be COVID+ 10/12/23 and kept on the cardiac floor for 10 days of isolation. Last day 10/22.    Pt to continue dialysis 10/23/23 for MWF schedule.    Pt seen and examined at bedside this AM without any complaints or events overnight, VSS, labs and telemetry reviewed and pt stable for discharge as discussed with Dr. Cr. Pt has received appropriate discharge instructions, including medication regimen, access site management and follow up with Dr. Larsen.     Pt to follow-up with Dr. Wilson for staged PCI in 4-5 weeks.      CAD REGIMEN UPON DISCHARGE  DAPT: ASA 81mg QD + Plavix 75mg QD  Atorvastatin 80mg QHS   Coreg 25mg BID

## 2023-10-10 NOTE — CHART NOTE - NSCHARTNOTEFT_GEN_A_CORE
Interventional Cardiology PA Sheath Pull Note    Pt without complaints. VSS      Pre-Sheath Removal:    [X] Right     [ ] Left          [ X] Groin    [ ] Brachial    [X ] 5Fr      [ ] 6Fr    [ ] 7Fr     [ ] 8Fr    [ ] Arterial  [ X] Venous sheath in place    [ ] Hematoma        [ ] Bleed    Pulses:    [ X] Right     [ ] Left       DP:  [ ]  Doppler   [ ]  Faint    [ X]   1+    [ ] 2+       Hemostasis Achieved with: 15 minutes manual pressure    Vasovagal Reaction: No    Meds Given: None    Post-Sheath Removal:    [X] Right     [ ] Left          [ X] Groin    [ ] Brachial    [ ] Hematoma        [ ] Bleed       [ ] Bruit    Pulses:    [ X] Right     [ ] Left       DP:  [ ]  Doppler   [ ]  Faint    [ X]   1+    [ ] 2+     A/P:  s/p [ ] Dx Cath      [ ] IVUS/FFR     [ ] PTA/STENT       [ X] PTCA/STENT    -Continue bedrest (pt given instructions)  -Continue to monitor

## 2023-10-10 NOTE — CHART NOTE - NSCHARTNOTEFT_GEN_A_CORE
Interventional Cardiology PA Sheath Pull Note    Pt without complaints. VSS      Pre-Sheath Removal:    [X ] Left          [X ] Groin    [X ] 7Fr     [X ] Arterial  sheath in place    [resolved Hematoma after compression prior to sheath removal, pt with continued oozing around sheath hub    Pulses:  Left DP: 1+/Doppler      Hemostasis Achieved with:  40 minutes manual pressure held by ROSA ISELA Bowman and ROSA ISELA High    Vasovagal Reaction: No    Meds Given:  Morphine 2mg IV X1 prior to sheath removal      Post-Sheath Removal:    [ X] Left          [X ] Groin    [no ] Hematoma [no ] Bleed       [no ] Bruit    Pulses:    [X ] Left       DP:  1+/Doppler    A/P:  s/p [X ] PTCA/STENT    -Continue bedrest (pt given instructions)  -Continue to monitor

## 2023-10-10 NOTE — PROVIDER CONTACT NOTE (OTHER) - SITUATION
Received pt from cath lab approximately 12pm, sites noted to be oozing a little, cath lab RN stated "this is from the sheath being placed" next check was due at 1300.
Pt has complaints of R groin discomfort on palpation.

## 2023-10-10 NOTE — CONSULT NOTE ADULT - ASSESSMENT
ESRD:  Last HD   Rx:   EDW:    Plan:  HD today as below   Renal diet   Daily BMP         Access:  LUE AVF functional       HTN:  BP elevated   UF with HD as tolerated   continue home BP meds    Anemia:  Hgb at goal 11.4   No indication for EPO or iron at this time     MBD:  Calcium: 9.0  Phos: 6.8  Continue phos binders          ESRD:  Last HD: 10/9 at [facility]   Rx: 3hrs   EDW:  Will obtain outpatient notes     Plan:  HD today as below   Renal diet   Daily BMP         Access:  LUE AVF functional       HTN:  BP elevated   UF with HD as tolerated   continue home BP meds    Anemia:  Hgb at goal 11.4   No indication for EPO or iron at this time     MBD:  Calcium: 9.0  Phos: 6.8  Continue phos binders  73Y F ESRD found to have NSTEMI, transferred to [facility], last HD at [facility] on 10/9, now s/p cardiac cath, c/o by hematoma, stable lytes and volume status, consulted for in patient HD management         ESRD:  Last HD: 10/9 at [facility]   Rx: 3hrs   EDW:   Will obtain outpatient notes     Plan:  Will hold off HD today given stable lytes and volume status and now possible rp bleed,   Plan for iHD 10/11 will reassess HDS    Renal diet   Daily BMP         Access:  LUE AVF functional       HTN:  BP elevated   UF with HD as tolerated   continue home BP meds    Anemia:  Hgb at goal 11.4 would follow up repeat CBC given hematoma, and can consider CT abdomen/pelvis if concern for RP bleed.  No indication for EPO or iron at this time     MBD:  Calcium: 9.0  Phos: 6.8  Continue phos binders

## 2023-10-10 NOTE — PROGRESS NOTE ADULT - PROBLEM SELECTOR PLAN 6
- Fluids: not indicated   - Replete Lytes to K>4, Mg>2 PRN   - Diet: DASH   - GI ppx: PPI   - PT Eval: pending   - Dispo/SW: Medically active

## 2023-10-10 NOTE — DIETITIAN INITIAL EVALUATION ADULT - PROBLEM/PLAN-1
DISPLAY PLAN FREE TEXT
63 Y/O F,. non-verbal, bed bound, Trach, PEG tube, from NH, PMhx of HTN, asthma, convulsion disorder, bipolar, DVT legs, DM, sent from NH due to dislodgement of PEG tube and for PEG tube insertion. Unable to get any history from patient, all source of info is NH papers. No record of fever, SOB there. Patient was started on Tamiflu 75 mg daily and supposed to continue upto 2/20/18. In the Ed initial vitals, labs stable, got 1 L bolus, admitted for placement of PEG tube.     Allergies: NKDA

## 2023-10-10 NOTE — DIETITIAN INITIAL EVALUATION ADULT - PERTINENT LABORATORY DATA
10-10    140  |  96  |  36<H>  ----------------------------<  116<H>  4.4   |  26  |  9.13<H>    Ca    9.0      10 Oct 2023 05:30  Phos  6.8     10-10  Mg     2.3     10-10    TPro  6.3  /  Alb  3.6  /  TBili  0.2  /  DBili  x   /  AST  15  /  ALT  <5<L>  /  AlkPhos  124<H>  10-10  POCT Blood Glucose.: 122 mg/dL (10-10-23 @ 11:00)  A1C with Estimated Average Glucose Result: 5.1 % (10-10-23 @ 05:30)

## 2023-10-10 NOTE — PROGRESS NOTE ADULT - NS ATTEND AMEND GEN_ALL_CORE FT
74 yo female, residing in shelter, with PMHx of ESRD on HD via left arm fistula (MWF), HTN, DM-II, CAD s/p PCI 2017, gout, and fibroids, admitted for NSTEMI.       1. CAD/NSTEMI type 1  S/p cardiac cath on 10/8/23 with Dr. Wilson revealing 3VCAD. LM minimal LI, severely calcified. mLAD 75% severly calcified, tortous. pLCx 70%, tortous. pRCA 90% at edge of previous stent, tortous.   Continue aspirin 81 mg and Plavix 75 mg. Atorvastatin 80 mg.    2. ESRD HD  HD tomorrow after PCI.    3. Hypertension  Continue amlodipine 10 mg.  Add lopressor 25 mg bid.  TTE 10/1/23: LV EF 60-65%, LVH, normal wall motion. G1DD. Trace MR. Aortic valve mildly calcified. 74 yo female, residing in shelter, with PMHx of ESRD on HD via left arm fistula (MWF), HTN, DM-II, CAD s/p PCI 2017, gout, and fibroids, admitted for NSTEMI.       1. CAD/NSTEMI type 1  S/p cardiac cath on 10/8/23 with Dr. Wilson revealing 3VCAD. LM minimal LI, severely calcified. mLAD 75% severly calcified, tortous. pLCx 70%, tortous. pRCA 90% at edge of previous stent, tortous.   Continue aspirin 81 mg and Plavix 75 mg. Atorvastatin 80 mg.    2. ESRD HD  HD tomorrow after PCI.    3. Hypertension  Continue amlodipine 10 mg.  Add lopressor 25 mg bid.    4. Macrocytic anemia  send folate b12 TSH for further evaluation.    TTE 10/1/23: LV EF 60-65%, LVH, normal wall motion. G1DD. Trace MR. Aortic valve mildly calcified.

## 2023-10-10 NOTE — CONSULT NOTE ADULT - SUBJECTIVE AND OBJECTIVE BOX
HPI:  72 yo female, residing in shelter, with PMHx of ESRD on HD via left arm fistula (MWF), HTN, DM-II, CAD s/p PCI 2017, gout, and fibroids, recently treated in August for Colitis in August, presented to [facility] on 10/7/23 complaining of sudden onset of lower abdominal pain at 1AM, 10/10 in severity. She endorsed associated palpitations and vomiting but denied any dizziness, chest pain, diaphoresis, recent sick contacts.     Vitals in [facility] ED /92 mmHg, , RR 18, SpO2 100% on RA, T 98.1F. Labs significant for elevated BUN/Cr, AST, alk phos, and elevated troponin 0.25 > 2.75 > 3.6. CXR clear. CT A/P non significant. EKG with sinus tachycardia w possible T wave inversion in V4-V5. Pt received 4mg morphine, aspirin 325mg POx1, Plavix 300mg PO x1, initiated on heparin gtt and admitted for NSTEMI.     S/p cardiac cath on 10/8/23 with Dr. Wilson revealing 3VCAD. LM minimal LI, severely calcified. mLAD 75% severly calcified, tortous. pLCx 70%, tortous. pRCA 90% at edge of previous stent, tortous.     TTE 10/1/23: LV EF 60-65%, LVH, normal wall motion. G1DD. Trace MR. Aortic valve mildly calcified.     She is now transferred to [facility] for rota/PCI of RCA with Dr. Wilson on Tuesday 10/10/23.     Nephrology consulted for in patient HD management       PAST MEDICAL & SURGICAL HISTORY:  Gout      Diabetes mellitus      Hypertension      Mild hypercholesterolemia      ESRD on dialysis      CAD (coronary artery disease)      Colitis      No significant past surgical history            Allergies:  No Known Allergies      Home Medications:   allopurinol 100 mg oral tablet: 1 tab(s) orally once a day  amLODIPine 10 mg oral tablet: 1 tab(s) orally once a day  aspirin 81 mg oral delayed release tablet: 1 tab(s) orally once a day  cinacalcet 60 mg oral tablet: 1 tab(s) orally once a day  gabapentin 100 mg oral capsule: 1 cap(s) orally 3 times a day  hydrALAZINE 100 mg oral tablet: 1 tab(s) orally 2 times a day  hydrALAZINE 50 mg oral tablet: 1 tab(s) orally 3 times a day  insulin detemir 100 units/mL subcutaneous solution: 10 unit(s) subcutaneous once a day (at bedtime)  insulin lispro 100 units/mL injectable solution: 1 unit(s) injectable 3 times a day  Lipitor 80 mg oral tablet: 1 tab(s) orally once a day (at bedtime)  Lokelma 10 g oral powder for reconstitution: 10 gram(s) orally 3 times a week  pantoprazole 20 mg oral delayed release tablet: 1 tab(s) orally once a day  pantoprazole 40 mg oral delayed release tablet: 1 tab(s) orally once a day      Hospital Medications:   MEDICATIONS  (STANDING):  amLODIPine   Tablet 10 milliGRAM(s) Oral daily  aspirin enteric coated 81 milliGRAM(s) Oral daily  atorvastatin 80 milliGRAM(s) Oral at bedtime  cinacalcet 60 milliGRAM(s) Oral daily  clopidogrel Tablet 75 milliGRAM(s) Oral daily  dextrose 5%. 1000 milliLiter(s) (50 mL/Hr) IV Continuous <Continuous>  dextrose 5%. 1000 milliLiter(s) (100 mL/Hr) IV Continuous <Continuous>  dextrose 50% Injectable 25 Gram(s) IV Push once  dextrose 50% Injectable 25 Gram(s) IV Push once  dextrose 50% Injectable 12.5 Gram(s) IV Push once  gabapentin 100 milliGRAM(s) Oral <User Schedule>  glucagon  Injectable 1 milliGRAM(s) IntraMuscular once  insulin lispro (ADMELOG) corrective regimen sliding scale   SubCutaneous at bedtime  insulin lispro (ADMELOG) corrective regimen sliding scale   SubCutaneous three times a day before meals  metoprolol tartrate 25 milliGRAM(s) Oral two times a day  pantoprazole    Tablet 40 milliGRAM(s) Oral before breakfast      SOCIAL HISTORY:  Denies ETOh, Smoking    Family History:  FAMILY HISTORY:  No pertinent family history in first degree relatives          VITALS:  T(F): 97.5 (10-10-23 @ 06:34), Max: 97.5 (10-10-23 @ 06:34)  HR: 104 (10-10-23 @ 12:00)  BP: 165/68 (10-10-23 @ 12:00)  RR: 18 (10-10-23 @ 12:00)  SpO2: 95% (10-10-23 @ 12:00)  Wt(kg): --    10-09 @ 07:01  -  10-10 @ 07:00  --------------------------------------------------------  IN: 0 mL / OUT: 0 mL / NET: 0 mL    10-10 @ 07:01  -  10-10 @ 15:06  --------------------------------------------------------  IN: 0 mL / OUT: 0 mL / NET: 0 mL      Height (cm): 165.1 (10-09 @ 22:00)  Weight (kg): 74.2 (10-09 @ 22:00)  BMI (kg/m2): 27.2 (10-09 @ 22:00)  BSA (m2): 1.82 (10-09 @ 22:00)  CAPILLARY BLOOD GLUCOSE      POCT Blood Glucose.: 122 mg/dL (10 Oct 2023 11:00)  POCT Blood Glucose.: 132 mg/dL (10 Oct 2023 07:35)      Review of Systems:  CONSTITUTIONAL: No fever or chills.  RESPIRATORY: No shortness of breath, cough  CARDIOVASCULAR: No Chest pain, shortness of breath, palpitations  GASTROINTESTINAL: No abdominal pain, nausea, vomiting, diarrhea  GENITOURINARY: No urinary frequency, gross hematuria, dysuria  NEUROLOGICAL: No headache, weakness  SKIN: No rash or skin lesion  MUSCULOSKELETAL: No swelling  Psych: Denies suicidal or homicidal ideation    PHYSICAL EXAM:  GENERAL: Alert, awake, oriented x3   HEENT: AMY, EOMI, neck supple, no JVP  CHEST/LUNG: Bilateral clear breath sounds  HEART: Regular rate and rhythm, no murmur, no gallops, no rub   ABDOMEN: Soft, nontender, non distended  : No flank or supra pubic tenderness.  EXTREMITIES: no pedal edema  Neurology: AAOx3, no focal neurological deficit  SKIN: No rash or skin lesion     LABS:  10-10    140  |  96  |  36<H>  ----------------------------<  116<H>  4.4   |  26  |  9.13<H>    Ca    9.0      10 Oct 2023 05:30  Phos  6.8     10-10  Mg     2.3     10-10    TPro  6.3  /  Alb  3.6  /  TBili  0.2  /  DBili      /  AST  15  /  ALT  <5<L>  /  AlkPhos  124<H>  10-10    Creatinine Trend: 9.13 <--, 8.18 <--                        11.4   7.22  )-----------( 282      ( 10 Oct 2023 05:30 )             38.3     Urine Studies:  Urinalysis Basic - ( 10 Oct 2023 05:30 )    Color:  / Appearance:  / SG:  / pH:   Gluc: 116 mg/dL / Ketone:   / Bili:  / Urobili:    Blood:  / Protein:  / Nitrite:    Leuk Esterase:  / RBC:  / WBC    Sq Epi:  / Non Sq Epi:  / Bacteria:

## 2023-10-10 NOTE — PROGRESS NOTE ADULT - ASSESSMENT
73F with hx of HTN, HLD, DM2, 3vCAD with mLAD 75%, pLCx 70%, and tortous pRCA 90%, presenting as a transfer from [facility] for NSTEMI and possible staged PCI. Now s/p LHC with p-mRCA 90% ISR s/p Rota/JAMIL x1 and residual LAD 75%. Plan for Staged PCI for residual LAD lesion in 5 weeks.

## 2023-10-10 NOTE — DISCHARGE NOTE PROVIDER - NSDCFUSCHEDAPPT_GEN_ALL_CORE_FT
Clifton Larsen  [facility] Physician [facility]  [facility]ART[facility] [address]  Scheduled Appointment: 10/19/2023

## 2023-10-10 NOTE — PROVIDER CONTACT NOTE (OTHER) - ASSESSMENT
went to assess pt's groin sites and pulses, noted to be oozing more but pulses +1 and palpable. /73 (99), , 94% on RA, 18 RR.
Slightly above incision site a hard spot when pressing down, pt complaints of discomfort and pain.

## 2023-10-11 ENCOUNTER — TRANSCRIPTION ENCOUNTER (OUTPATIENT)
Age: 73
End: 2023-10-11

## 2023-10-11 LAB
ANION GAP SERPL CALC-SCNC: 16 MMOL/L — SIGNIFICANT CHANGE UP (ref 5–17)
BUN SERPL-MCNC: 52 MG/DL — HIGH (ref 7–23)
CALCIUM SERPL-MCNC: 8.3 MG/DL — LOW (ref 8.4–10.5)
CHLORIDE SERPL-SCNC: 96 MMOL/L — SIGNIFICANT CHANGE UP (ref 96–108)
CO2 SERPL-SCNC: 25 MMOL/L — SIGNIFICANT CHANGE UP (ref 22–31)
CREAT SERPL-MCNC: 11.65 MG/DL — HIGH (ref 0.5–1.3)
EGFR: 3 ML/MIN/1.73M2 — LOW
FOLATE SERPL-MCNC: >20 NG/ML — SIGNIFICANT CHANGE UP
GLUCOSE BLDC GLUCOMTR-MCNC: 134 MG/DL — HIGH (ref 70–99)
GLUCOSE BLDC GLUCOMTR-MCNC: 134 MG/DL — HIGH (ref 70–99)
GLUCOSE BLDC GLUCOMTR-MCNC: 144 MG/DL — HIGH (ref 70–99)
GLUCOSE BLDC GLUCOMTR-MCNC: 162 MG/DL — HIGH (ref 70–99)
GLUCOSE SERPL-MCNC: 150 MG/DL — HIGH (ref 70–99)
HCT VFR BLD CALC: 35 % — SIGNIFICANT CHANGE UP (ref 34.5–45)
HGB BLD-MCNC: 10.6 G/DL — LOW (ref 11.5–15.5)
MAGNESIUM SERPL-MCNC: 2.5 MG/DL — SIGNIFICANT CHANGE UP (ref 1.6–2.6)
MCHC RBC-ENTMCNC: 30.1 PG — SIGNIFICANT CHANGE UP (ref 27–34)
MCHC RBC-ENTMCNC: 30.3 GM/DL — LOW (ref 32–36)
MCV RBC AUTO: 99.4 FL — SIGNIFICANT CHANGE UP (ref 80–100)
NRBC # BLD: 0 /100 WBCS — SIGNIFICANT CHANGE UP (ref 0–0)
PHOSPHATE SERPL-MCNC: 7.9 MG/DL — HIGH (ref 2.5–4.5)
PLATELET # BLD AUTO: 246 K/UL — SIGNIFICANT CHANGE UP (ref 150–400)
POTASSIUM SERPL-MCNC: 5.5 MMOL/L — HIGH (ref 3.5–5.3)
POTASSIUM SERPL-SCNC: 5.5 MMOL/L — HIGH (ref 3.5–5.3)
RBC # BLD: 3.52 M/UL — LOW (ref 3.8–5.2)
RBC # FLD: 17.8 % — HIGH (ref 10.3–14.5)
SODIUM SERPL-SCNC: 137 MMOL/L — SIGNIFICANT CHANGE UP (ref 135–145)
VIT B12 SERPL-MCNC: 1935 PG/ML — HIGH (ref 232–1245)
WBC # BLD: 6.61 K/UL — SIGNIFICANT CHANGE UP (ref 3.8–10.5)
WBC # FLD AUTO: 6.61 K/UL — SIGNIFICANT CHANGE UP (ref 3.8–10.5)

## 2023-10-11 PROCEDURE — 99222 1ST HOSP IP/OBS MODERATE 55: CPT | Mod: GC

## 2023-10-11 PROCEDURE — 99239 HOSP IP/OBS DSCHRG MGMT >30: CPT

## 2023-10-11 RX ORDER — POLYETHYLENE GLYCOL 3350 17 G/17G
17 POWDER, FOR SOLUTION ORAL DAILY
Refills: 0 | Status: DISCONTINUED | OUTPATIENT
Start: 2023-10-11 | End: 2023-10-22

## 2023-10-11 RX ORDER — SIMETHICONE 80 MG/1
80 TABLET, CHEWABLE ORAL
Refills: 0 | Status: DISCONTINUED | OUTPATIENT
Start: 2023-10-11 | End: 2023-10-22

## 2023-10-11 RX ORDER — SIMETHICONE 80 MG/1
80 TABLET, CHEWABLE ORAL ONCE
Refills: 0 | Status: COMPLETED | OUTPATIENT
Start: 2023-10-11 | End: 2023-10-11

## 2023-10-11 RX ORDER — ACETAMINOPHEN 500 MG
650 TABLET ORAL ONCE
Refills: 0 | Status: COMPLETED | OUTPATIENT
Start: 2023-10-11 | End: 2023-10-11

## 2023-10-11 RX ORDER — ACETAMINOPHEN 500 MG
650 TABLET ORAL EVERY 6 HOURS
Refills: 0 | Status: DISCONTINUED | OUTPATIENT
Start: 2023-10-11 | End: 2023-10-16

## 2023-10-11 RX ORDER — SENNA PLUS 8.6 MG/1
1 TABLET ORAL DAILY
Refills: 0 | Status: DISCONTINUED | OUTPATIENT
Start: 2023-10-11 | End: 2023-10-22

## 2023-10-11 RX ADMIN — Medication 650 MILLIGRAM(S): at 11:10

## 2023-10-11 RX ADMIN — Medication 650 MILLIGRAM(S): at 10:26

## 2023-10-11 RX ADMIN — Medication 650 MILLIGRAM(S): at 23:20

## 2023-10-11 RX ADMIN — Medication 650 MILLIGRAM(S): at 22:23

## 2023-10-11 RX ADMIN — PANTOPRAZOLE SODIUM 40 MILLIGRAM(S): 20 TABLET, DELAYED RELEASE ORAL at 06:04

## 2023-10-11 RX ADMIN — AMLODIPINE BESYLATE 10 MILLIGRAM(S): 2.5 TABLET ORAL at 06:04

## 2023-10-11 RX ADMIN — Medication 81 MILLIGRAM(S): at 14:19

## 2023-10-11 RX ADMIN — Medication 25 MILLIGRAM(S): at 06:04

## 2023-10-11 RX ADMIN — ATORVASTATIN CALCIUM 80 MILLIGRAM(S): 80 TABLET, FILM COATED ORAL at 22:24

## 2023-10-11 RX ADMIN — SENNA PLUS 1 TABLET(S): 8.6 TABLET ORAL at 18:26

## 2023-10-11 RX ADMIN — POLYETHYLENE GLYCOL 3350 17 GRAM(S): 17 POWDER, FOR SOLUTION ORAL at 18:26

## 2023-10-11 RX ADMIN — SIMETHICONE 80 MILLIGRAM(S): 80 TABLET, CHEWABLE ORAL at 22:24

## 2023-10-11 RX ADMIN — Medication 25 MILLIGRAM(S): at 17:29

## 2023-10-11 RX ADMIN — SIMETHICONE 80 MILLIGRAM(S): 80 TABLET, CHEWABLE ORAL at 16:01

## 2023-10-11 RX ADMIN — CINACALCET 60 MILLIGRAM(S): 30 TABLET, FILM COATED ORAL at 14:19

## 2023-10-11 RX ADMIN — GABAPENTIN 100 MILLIGRAM(S): 400 CAPSULE ORAL at 22:23

## 2023-10-11 RX ADMIN — CLOPIDOGREL BISULFATE 75 MILLIGRAM(S): 75 TABLET, FILM COATED ORAL at 14:19

## 2023-10-11 RX ADMIN — Medication 1: at 17:19

## 2023-10-11 NOTE — PROGRESS NOTE ADULT - PROBLEM SELECTOR PLAN 1
- DAPT: ASA 81mg + Plavix 75mg   - Continue Atorvastatin 80mg QHS   - [facility] ([facility] [place]): 3vCAD with mLAD 75%, pLCx 70%, and tortous pRCA 90%; s/p Heparin gtt for ACS coverage   - [facility] (10/10/23): Rota/JAMIL x1 to p-mRCA (90% ISR), LAD 75%. Plan for Staged PCI of residual LAD lesion in 5 weeks   - TTE (10/01/23): LV EF 60-65%, LVH, normal wall motion. G1DD. Trace MR. Aortic valve mildly calcified

## 2023-10-11 NOTE — PROGRESS NOTE ADULT - SUBJECTIVE AND OBJECTIVE BOX
CARDIOLOGY PA PROGRESS NOTE    Interval Events:  - s/p LHC with p-mRCA 90% ISR s/p Rota/JAMIL x1 and residual LAD 75%  - Post-cath course c/b Lt groin hematoma and oozing (see Chart Note)     Overnight Events: PREMA    Subjective:  - Pt seen and examined this AM sitting comfortably in bed. Feels well; no acute complaints of CP, palpitations, SOB, abdominal discomfort, N/V/D      Tele: NSR; no acute events    MEDICATIONS:  amLODIPine   Tablet 10 milliGRAM(s) Oral daily  metoprolol tartrate 25 milliGRAM(s) Oral two times a day  gabapentin 100 milliGRAM(s) Oral <User Schedule>  pantoprazole    Tablet 40 milliGRAM(s) Oral before breakfast  atorvastatin 80 milliGRAM(s) Oral at bedtime  cinacalcet 60 milliGRAM(s) Oral daily  dextrose 50% Injectable 25 Gram(s) IV Push once  dextrose 50% Injectable 25 Gram(s) IV Push once  dextrose 50% Injectable 12.5 Gram(s) IV Push once  dextrose Oral Gel 15 Gram(s) Oral once PRN  glucagon  Injectable 1 milliGRAM(s) IntraMuscular once  insulin lispro (ADMELOG) corrective regimen sliding scale   SubCutaneous three times a day before meals  insulin lispro (ADMELOG) corrective regimen sliding scale   SubCutaneous at bedtime  aspirin enteric coated 81 milliGRAM(s) Oral daily  clopidogrel Tablet 75 milliGRAM(s) Oral daily  dextrose 5%. 1000 milliLiter(s) IV Continuous <Continuous>  dextrose 5%. 1000 milliLiter(s) IV Continuous <Continuous>    	  VITAL SIGNS:  T(C): 36.8 (10-11-23 @ 14:16), Max: 37 (10-11-23 @ 05:53)  HR: 82 (10-11-23 @ 14:16) (71 - 112)  BP: 112/60 (10-11-23 @ 14:16) (112/60 - 176/79)  RR: 16 (10-11-23 @ 14:16) (16 - 18)  SpO2: 99% (10-11-23 @ 14:16) (91% - 99%)  Wt(kg): --    I&O's Summary    10 Oct 2023 07:01  -  11 Oct 2023 07:00  --------------------------------------------------------  IN: 0 mL / OUT: 0 mL / NET: 0 mL    11 Oct 2023 07:01  -  11 Oct 2023 16:18  --------------------------------------------------------  IN: 180 mL / OUT: 1400 mL / NET: -1220 mL              PHYSICAL EXAM:  HENT: NCAT, EOMI, PEERLA  CV: RRR, S1/S2. No JVD. No murmurs, rubs or gallops   PULM: CTA B/L. No wheezing, rales, or rhonchi   ABD: Soft, NT/ND, +BS throughout   EXT: Warm, no LE edema. B/L groins covered with dressing -- C/D/I; no oozing, bleeding, or hematoma noted   NEURO: AOx3; no focal neuro deficits      LABS:	 	                        10.6   6.61  )-----------( 246      ( 11 Oct 2023 05:30 )             35.0     10-11    137  |  96  |  52<H>  ----------------------------<  150<H>  5.5<H>   |  25  |  11.65<H>    Ca    8.3<L>      11 Oct 2023 05:30  Phos  7.9     10-11  Mg     2.5     10-11    TPro  6.3  /  Alb  3.6  /  TBili  0.2  /  DBili  x   /  AST  15  /  ALT  <5<L>  /  AlkPhos  124<H>  10-10    PT/INR - ( 09 Oct 2023 23:07 )   PT: 11.2 sec;   INR: 0.98          PTT - ( 10 Oct 2023 05:30 )  PTT:90.8 sec

## 2023-10-11 NOTE — PROGRESS NOTE ADULT - NS ATTEND AMEND GEN_ALL_CORE FT
72 yo female, residing in shelter, with PMHx of ESRD on HD via left arm fistula (MWF), HTN, DM-II, CAD s/p PCI 2017, gout, and fibroids, admitted for NSTEMI.     1. CAD/NSTEMI type 1  S/p cardiac cath on 10/8/23 with Dr. Wilson revealing 3VCAD. LM minimal LI, severely calcified. mLAD 75% severly calcified, tortous. pLCx 70%, tortous. pRCA 90% at edge of previous stent, tortous.   Continue aspirin 81 mg and Plavix 75 mg. Atorvastatin 80 mg.    2. ESRD HD  HD tomorrow after PCI.    3. Hypertension  Continue amlodipine 10 mg.  Add lopressor 25 mg bid.    4. Macrocytic anemia  send folate b12 TSH for further evaluation.    TTE 10/1/23: LV EF 60-65%, LVH, normal wall motion. G1DD. Trace MR. Aortic valve mildly calcified.

## 2023-10-11 NOTE — PROGRESS NOTE ADULT - PROBLEM SELECTOR PLAN 2
- HD via LUE AVF (MWF). Plan for HD on 10/11/23  - Continue Cinacalcet 60mg QD   - Renal following, appreciate recs

## 2023-10-11 NOTE — PROGRESS NOTE ADULT - SUBJECTIVE AND OBJECTIVE BOX
Patient seen on HD tolerating procedure well. Patient does not offer any complaints and is hemodynamically stable.  Continue HD as prescribed.       Vitals   T(F): 98.4  HR: 76  BP: 141/70  ABP: --  RR: 18  SpO2: 97%  CVP(cm H2O): --  CO: --  CI: --  PA: --  PA(direct): --  PCWP: --  LA: --  RA: --  SVR: --  SVRI: --  PVR: --  PVRI: --          Review of Systems:  CONSTITUTIONAL: No fever or chills.  RESPIRATORY: No shortness of breath, cough  CARDIOVASCULAR: No Chest pain, shortness of breath, palpitations  GASTROINTESTINAL: No abdominal pain, nausea, vomiting  NEUROLOGICAL: No headache, weakness  SKIN: No rash or skin lesion  MUSCULOSKELETAL: No swelling      PHYSICAL EXAM:  GENERAL: Alert, awake, NAD laying in bed tolerating HD   HEENT: AMY, EOMI, neck supple, no JVP  CHEST/LUNG: Bilateral clear breath sounds  HEART: Regular rate and rhythm, no murmur, no gallops, no rub   ABDOMEN: Soft, nontender, non distended  : No flank or supra pubic tenderness.  EXTREMITIES: no pedal edema  Neurology: AAOx3, no focal neurological deficit  SKIN: No rash or skin lesion   ACCESS: LUE AVF w/bruit and thrill  Patient seen on HD tolerating procedure well. Patient does not offer any complaints and is hemodynamically stable.  Continue HD as prescribed.   Hemodialysis Treatment.:     Schedule: Once, Modality: Hemodialysis, Access: Arteriovenous Fistula    Dialyzer: Optiflux [id number], Time: 210 Min    Blood Flow: 400 mL/Min , Dialysate Flow: 500 mL/Min, Dialysate Temp: 36.5, Tubinmm (Adult)    Target Fluid Removal: 2 Liters    Dialysate Electrolytes (mEq/L): Potassium 2, Calcium 2.5, Sodium 138, Bicarbonate 35    Additional Instructions: Hold UF if SBP < 100 (10-11-23 @ 08:43) [Completed]      Vitals   T(F): 98.4  HR: 76  BP: 141/70  RR: 18  SpO2: 97%            PHYSICAL EXAM:  GENERAL: Alert, awake, NAD laying in bed tolerating HD   CHEST/LUNG: Bilateral clear breath sounds  HEART: Regular rate and rhythm  ABDOMEN: Soft, nontender, non distended  EXTREMITIES: no pedal edema  Neurology: AAOx3, no focal neurological deficit  ACCESS: LUE AVF w/bruit and thrill

## 2023-10-11 NOTE — PROGRESS NOTE ADULT - ASSESSMENT
73F with hx of HTN, HLD, DM2, 3vCAD with mLAD 75%, pLCx 70%, and tortous pRCA 90%, presenting as a transfer from [facility] for NSTEMI and possible staged PCI. Now s/p LHC with p-mRCA 90% ISR s/p Rota/JAMIL x1 and residual LAD 75%. Plan for Staged PCI for residual LAD lesion in 5 weeks.

## 2023-10-11 NOTE — PROGRESS NOTE ADULT - PROBLEM SELECTOR PLAN 6
- Fluids: not indicated   - Replete Lytes to K>4, Mg>2 PRN   - Diet: DASH   - GI ppx: PPI   - PT Eval: pending   - Dispo/SW: Awaiting shelter placement

## 2023-10-11 NOTE — DISCHARGE NOTE NURSING/CASE MANAGEMENT/SOCIAL WORK - PATIENT PORTAL LINK FT
You can access the FollowMyHealth Patient Portal offered by [facility] by registering at the following website: [email or web address]. By joining [employer]’s FollowMyHealth portal, you will also be able to view your health information using other applications (apps) compatible with our system.

## 2023-10-12 DIAGNOSIS — U07.1 COVID-19: ICD-10-CM

## 2023-10-12 LAB
ANION GAP SERPL CALC-SCNC: 14 MMOL/L — SIGNIFICANT CHANGE UP (ref 5–17)
BUN SERPL-MCNC: 32 MG/DL — HIGH (ref 7–23)
CALCIUM SERPL-MCNC: 8.1 MG/DL — LOW (ref 8.4–10.5)
CHLORIDE SERPL-SCNC: 96 MMOL/L — SIGNIFICANT CHANGE UP (ref 96–108)
CO2 SERPL-SCNC: 28 MMOL/L — SIGNIFICANT CHANGE UP (ref 22–31)
CREAT SERPL-MCNC: 7.9 MG/DL — HIGH (ref 0.5–1.3)
EGFR: 5 ML/MIN/1.73M2 — LOW
GLUCOSE BLDC GLUCOMTR-MCNC: 135 MG/DL — HIGH (ref 70–99)
GLUCOSE BLDC GLUCOMTR-MCNC: 143 MG/DL — HIGH (ref 70–99)
GLUCOSE BLDC GLUCOMTR-MCNC: 155 MG/DL — HIGH (ref 70–99)
GLUCOSE BLDC GLUCOMTR-MCNC: 267 MG/DL — HIGH (ref 70–99)
GLUCOSE BLDC GLUCOMTR-MCNC: 267 MG/DL — HIGH (ref 70–99)
GLUCOSE SERPL-MCNC: 133 MG/DL — HIGH (ref 70–99)
HCT VFR BLD CALC: 37.1 % — SIGNIFICANT CHANGE UP (ref 34.5–45)
HGB BLD-MCNC: 10.9 G/DL — LOW (ref 11.5–15.5)
MAGNESIUM SERPL-MCNC: 2.2 MG/DL — SIGNIFICANT CHANGE UP (ref 1.6–2.6)
MCHC RBC-ENTMCNC: 29.4 GM/DL — LOW (ref 32–36)
MCHC RBC-ENTMCNC: 29.9 PG — SIGNIFICANT CHANGE UP (ref 27–34)
MCV RBC AUTO: 101.9 FL — HIGH (ref 80–100)
NRBC # BLD: 0 /100 WBCS — SIGNIFICANT CHANGE UP (ref 0–0)
PHOSPHATE SERPL-MCNC: 5.8 MG/DL — HIGH (ref 2.5–4.5)
PLATELET # BLD AUTO: 227 K/UL — SIGNIFICANT CHANGE UP (ref 150–400)
POTASSIUM SERPL-MCNC: 4.6 MMOL/L — SIGNIFICANT CHANGE UP (ref 3.5–5.3)
POTASSIUM SERPL-SCNC: 4.6 MMOL/L — SIGNIFICANT CHANGE UP (ref 3.5–5.3)
RAPID RVP RESULT: DETECTED
RBC # BLD: 3.64 M/UL — LOW (ref 3.8–5.2)
RBC # FLD: 17 % — HIGH (ref 10.3–14.5)
SARS-COV-2 RNA SPEC QL NAA+PROBE: DETECTED
SODIUM SERPL-SCNC: 138 MMOL/L — SIGNIFICANT CHANGE UP (ref 135–145)
WBC # BLD: 7.93 K/UL — SIGNIFICANT CHANGE UP (ref 3.8–10.5)
WBC # FLD AUTO: 7.93 K/UL — SIGNIFICANT CHANGE UP (ref 3.8–10.5)

## 2023-10-12 PROCEDURE — 71045 X-RAY EXAM CHEST 1 VIEW: CPT | Mod: 26

## 2023-10-12 PROCEDURE — 99254 IP/OBS CNSLTJ NEW/EST MOD 60: CPT

## 2023-10-12 PROCEDURE — 99232 SBSQ HOSP IP/OBS MODERATE 35: CPT

## 2023-10-12 RX ORDER — BENZOCAINE AND MENTHOL 5; 1 G/100ML; G/100ML
1 LIQUID ORAL
Refills: 0 | Status: DISCONTINUED | OUTPATIENT
Start: 2023-10-12 | End: 2023-10-22

## 2023-10-12 RX ORDER — ACETAMINOPHEN 500 MG
650 TABLET ORAL ONCE
Refills: 0 | Status: COMPLETED | OUTPATIENT
Start: 2023-10-12 | End: 2023-10-12

## 2023-10-12 RX ORDER — MONTELUKAST 4 MG/1
10 TABLET, CHEWABLE ORAL DAILY
Refills: 0 | Status: DISCONTINUED | OUTPATIENT
Start: 2023-10-12 | End: 2023-10-22

## 2023-10-12 RX ADMIN — BENZOCAINE AND MENTHOL 1 LOZENGE: 5; 1 LIQUID ORAL at 11:30

## 2023-10-12 RX ADMIN — Medication 25 MILLIGRAM(S): at 06:20

## 2023-10-12 RX ADMIN — PANTOPRAZOLE SODIUM 40 MILLIGRAM(S): 20 TABLET, DELAYED RELEASE ORAL at 06:20

## 2023-10-12 RX ADMIN — SENNA PLUS 1 TABLET(S): 8.6 TABLET ORAL at 12:21

## 2023-10-12 RX ADMIN — SIMETHICONE 80 MILLIGRAM(S): 80 TABLET, CHEWABLE ORAL at 17:01

## 2023-10-12 RX ADMIN — Medication 650 MILLIGRAM(S): at 16:44

## 2023-10-12 RX ADMIN — Medication 100 MILLIGRAM(S): at 18:29

## 2023-10-12 RX ADMIN — ATORVASTATIN CALCIUM 80 MILLIGRAM(S): 80 TABLET, FILM COATED ORAL at 21:48

## 2023-10-12 RX ADMIN — Medication 1: at 17:01

## 2023-10-12 RX ADMIN — Medication 650 MILLIGRAM(S): at 05:20

## 2023-10-12 RX ADMIN — MONTELUKAST 10 MILLIGRAM(S): 4 TABLET, CHEWABLE ORAL at 18:29

## 2023-10-12 RX ADMIN — SIMETHICONE 80 MILLIGRAM(S): 80 TABLET, CHEWABLE ORAL at 06:20

## 2023-10-12 RX ADMIN — Medication 25 MILLIGRAM(S): at 17:01

## 2023-10-12 RX ADMIN — AMLODIPINE BESYLATE 10 MILLIGRAM(S): 2.5 TABLET ORAL at 06:19

## 2023-10-12 RX ADMIN — Medication 650 MILLIGRAM(S): at 04:20

## 2023-10-12 RX ADMIN — POLYETHYLENE GLYCOL 3350 17 GRAM(S): 17 POWDER, FOR SOLUTION ORAL at 12:21

## 2023-10-12 NOTE — PROGRESS NOTE ADULT - ASSESSMENT
73F with hx of HTN, HLD, DM2, 3vCAD with mLAD 75%, pLCx 70%, and tortous pRCA 90%, presenting as a transfer from [facility] for NSTEMI and possible staged PCI. Now s/p LHC with p-mRCA 90% ISR s/p Rota/JAMIL x1 and residual LAD 75%. Plan for Staged PCI for residual LAD lesion in 5 weeks.

## 2023-10-12 NOTE — PROGRESS NOTE ADULT - PROBLEM SELECTOR PLAN 6
- Fluids: not indicated   - Replete Lytes to K>4, Mg>2 PRN   - Diet: DASH   - GI ppx: PPI   - PT Eval: pending   - Dispo/SW: Awaiting shelter placement - Continue Gabapentin 100mg (MWF)

## 2023-10-12 NOTE — PROGRESS NOTE ADULT - NS ATTEND AMEND GEN_ALL_CORE FT
74 yo female, residing in shelter, with PMHx of ESRD on HD via left arm fistula (MWF), HTN, DM-II, CAD s/p PCI 2017, gout, and fibroids, admitted for NSTEMI.     1. CAD/NSTEMI type 1  S/p cardiac cath on 10/8/23 with Dr. Wilson revealing 3VCAD. LM minimal LI, severely calcified. mLAD 75% severly calcified, tortous. pLCx 70%, tortous. pRCA 90% at edge of previous stent, tortous.   Continue aspirin 81 mg and Plavix 75 mg. Atorvastatin 80 mg.    2. ESRD HD  HD per renal.    3. Hypertension  Continue amlodipine 10 mg and lopressor 25 mg bid.    4. COVID +  Tested + Destini MARLEY, dispo pending.

## 2023-10-12 NOTE — PROGRESS NOTE ADULT - PROBLEM SELECTOR PLAN 1
- DAPT: ASA 81mg + Plavix 75mg   - Continue Atorvastatin 80mg QHS   - [facility] [place] 3vCAD with mLAD 75%, pLCx 70%, and tortous pRCA 90%; s/p Heparin gtt for ACS coverage   - [facility] (10/10/23): Rota/JAMIL x1 to p-mRCA (90% ISR), LAD 75%. Plan for Staged PCI of residual LAD lesion in 5 weeks   - TTE (10/01/23): LV EF 60-65%, LVH, normal wall motion. G1DD. Trace MR. Aortic valve mildly calcified

## 2023-10-12 NOTE — PHYSICAL THERAPY INITIAL EVALUATION ADULT - PERTINENT HX OF CURRENT PROBLEM, REHAB EVAL
72 yo female, residing in shelter, with PMHx of ESRD on HD via left arm fistula (MWF), HTN, DM-II, CAD s/p PCI 2017, gout, and fibroids, recently treated in August for Colitis in August, presented to [facility] on 10/7/23 complaining of sudden onset of lower abdominal pain at 1AM, 10/10 in severity. She endorsed associated palpitations and vomiting but denied any dizziness, chest pain, diaphoresis, recent sick contacts.     Vitals in [facility] ED /92 mmHg, , RR 18, SpO2 100% on RA, T 98.1F. Labs significant for elevated BUN/Cr, AST, alk phos, and elevated troponin 0.25 > 2.75 > 3.6. CXR clear. CT A/P non significant. EKG with sinus tachycardia w possible T wave inversion in V4-V5. Pt received 4mg morphine, aspirin 325mg POx1, Plavix 300mg PO x1, initiated on heparin gtt and admitted for NSTEMI.     S/p cardiac cath on 10/8/23 with Dr. Wilson revealing 3VCAD. LM minimal LI, severely calcified. mLAD 75% severly calcified, tortous. pLCx 70%, tortous. pRCA 90% at edge of previous stent, tortous.     TTE 10/1/23: LV EF 60-65%, LVH, normal wall motion. G1DD. Trace MR. Aortic valve mildly calcified.     She is now transferred to [facility] for rota/PCI of RCA with Dr. Wilson on Tuesday 10/10/23.

## 2023-10-12 NOTE — PROGRESS NOTE ADULT - PROBLEM SELECTOR PLAN 2
- HD via LUE AVF (MWF). Plan for HD on 10/13/23  - Continue Cinacalcet 60mg QD   - Renal following, appreciate recs

## 2023-10-12 NOTE — CONSULT NOTE ADULT - ASSESSMENT
This is a 73F with PMHX of CAD s/p PCI (2017) and recent T1 NSTEMI (s/p recent cardiac cath), ESRD on HD (via LUE AVF), HTN, T2DM, gout, fibroids who presents with a chief complaint of NSTEMI. Medicine consulted for COVID.     #COVID-19  PCR+ on 10/12, reports sore throat. Afebrile. WBC WNL. On RA 96%.  Unable to receive remdesivir or paxlovid given CrCl <30. Does not qualify for dexamethasone given not on mechanical ventilation.   - Supportive care  - DVT PPX while inpatient    Med consult will continue to follow.  Thank you for this consult. Please page [phone] for any questions.  Recommendations not final until attestation signed by attending.   This is a 73F with PMHX of CAD s/p PCI (2017) and recent T1 NSTEMI (s/p recent cardiac cath), ESRD on HD (via LUE AVF), HTN, T2DM, gout, fibroids who presents with a chief complaint of NSTEMI. Medicine consulted for COVID.     #COVID-19  PCR+ on 10/12, reports sore throat. Afebrile. WBC WNL. On RA 96%. No reported history of asthma/copd. Vaccinated with "multiple boosters" per patient (J&J).  Unable to receive remdesivir or paxlovid given CrCl <30. Does not qualify for dexamethasone given not on mechanical ventilation.   - Supportive care:  - Robitussin-DM every 8 hours  - Montelukast 10mg qd  - Cepacol lozenges for sore throat  - DVT PPX while inpatient    Med consult will continue to follow.  Thank you for this consult. Please page [phone] for any questions.  Recommendations not final until attestation signed by attending.

## 2023-10-12 NOTE — PHYSICAL THERAPY INITIAL EVALUATION ADULT - NSPTDISCHREC_GEN_A_CORE
Interventional Radiology at [phone] will be setting up your procedure.  They will also set up your CT SCAN.  Once both of these tests are scheduled, please call [phone] #[phone] to schedule your follow up appointment with Dr. Jane for 3 days after your procedure.    [Mother] : mother Home PT

## 2023-10-12 NOTE — CONSULT NOTE ADULT - ATTENDING COMMENTS
This is a 73F with PMHX of CAD s/p PCI (2017) and recent T1 NSTEMI (s/p recent cardiac cath), ESRD on HD (via LUE AVF), HTN, T2DM, gout, fibroids who presents with a chief complaint of NSTEMI. Medicine consulted for COVID.   pt seen with Dr. Barbosa  labs/imaging reviewed    main concern is Cough -not productive, chest x ray- no pneumonia.  saturating well on room air 94-95 %   no shortness of breath.  on exam - speaking full sentence, no sign of respiratory distress, no tachypnea.  lungs clear on exam     Covid -mild, dose not require oxygen  - Remdesivir contraindicated due to ESRD   - supportive care.  - anti-tussive - Robitussin DM 10 ml po q 8 hourly standing please  - montelukast 10 mg po q daily   - if sign of bronchospasm - would add symbicort nebs bid ( currently not )  - monitor saturation - if hypoxic - saturation less than 92- to start oxygen supplement and Dexa 6 mg per daily along with Protonix   - thank you for allowing medicine to participate in the care, will follow.   dw Dr. Barbosa and cardiology PA.
I: HTN uncontrolled. Last dialyzed 2d ago.     A: ESRD. S/P Cath yesterday.   P: Continue BP meds. Dialysis tomorrow.

## 2023-10-12 NOTE — CONSULT NOTE ADULT - SUBJECTIVE AND OBJECTIVE BOX
VALENTE CRAMER  73y  Female    Patient is a 73y old  Female who presents with a chief complaint of NSTEMI (12 Oct 2023 11:46). Medicine consulted for COVID.    PAST MEDICAL/SURGICAL HISTORY  PAST MEDICAL & SURGICAL HISTORY:  ESRD on dialysis    DM (diabetes mellitus)    Hypertension    CAD (coronary artery disease)    Gout    Diabetes mellitus    Hypertension    Mild hypercholesterolemia    ESRD on dialysis    CAD (coronary artery disease)    Colitis    H/O heart artery stent    No significant past surgical history      REVIEW OF SYSTEMS:  CONSTITUTIONAL: No fever, weight loss, or fatigue  EYES: No eye pain, visual disturbances, or discharge  ENMT:  No difficulty hearing, tinnitus, vertigo; No sinus or throat pain  NECK: No pain or stiffness  BREASTS: No pain, masses, or nipple discharge  RESPIRATORY: No cough, wheezing, chills or hemoptysis; No shortness of breath  CARDIOVASCULAR: No chest pain, palpitations, dizziness, or leg swelling  GASTROINTESTINAL: No abdominal or epigastric pain. No nausea, vomiting, or hematemesis; No diarrhea or constipation. No melena or hematochezia.  GENITOURINARY: No dysuria, frequency, hematuria, or incontinence  NEUROLOGICAL: No headaches, memory loss, loss of strength, numbness, or tremors  SKIN: No itching, burning, rashes, or lesions   LYMPH NODES: No enlarged glands  ENDOCRINE: No heat or cold intolerance; No hair loss  MUSCULOSKELETAL: No joint pain or swelling; No muscle, back, or extremity pain  PSYCHIATRIC: No depression, anxiety, mood swings, or difficulty sleeping  HEME/LYMPH: No easy bruising, or bleeding gums  ALLERY AND IMMUNOLOGIC: No hives or eczema    T(C): 38.9 (10-12-23 @ 15:44), Max: 38.9 (10-12-23 @ 15:44)  HR: 90 (10-12-23 @ 12:20) (74 - 90)  BP: 147/69 (10-12-23 @ 12:20) (124/60 - 147/69)  RR: 16 (10-12-23 @ 12:20) (16 - 18)  SpO2: 96% (10-12-23 @ 12:20) (93% - 97%)  Wt(kg): --Vital Signs Last 24 Hrs  T(C): 38.9 (12 Oct 2023 15:44), Max: 38.9 (12 Oct 2023 15:44)  T(F): 102 (12 Oct 2023 15:44), Max: 102 (12 Oct 2023 15:44)  HR: 90 (12 Oct 2023 12:20) (74 - 90)  BP: 147/69 (12 Oct 2023 12:20) (124/60 - 147/69)  BP(mean): 99 (12 Oct 2023 12:20) (85 - 99)  RR: 16 (12 Oct 2023 12:20) (16 - 18)  SpO2: 96% (12 Oct 2023 12:20) (93% - 97%)    Parameters below as of 12 Oct 2023 12:20  Patient On (Oxygen Delivery Method): room air    PHYSICAL EXAM:  GENERAL: NAD, well-groomed, well-developed  HEAD:  Atraumatic, Normocephalic  EYES: EOMI, PERRLA, conjunctiva and sclera clear  ENMT: No tonsillar erythema, exudates, or enlargement; Moist mucous membranes, Good dentition, No lesions  NECK: Supple, No JVD, Normal thyroid  NERVOUS SYSTEM:  Alert & Oriented X3, Good concentration; Motor Strength 5/5 B/L upper and lower extremities; DTRs 2+ intact and symmetric  CHEST/LUNG: Clear to percussion bilaterally; No rales, rhonchi, wheezing, or rubs  HEART: Regular rate and rhythm; No murmurs, rubs, or gallops  ABDOMEN: Soft, Nontender, Nondistended; Bowel sounds present  EXTREMITIES:  2+ Peripheral Pulses, No clubbing, cyanosis, or edema  LYMPH: No lymphadenopathy noted  SKIN: No rashes or lesions    Consultant(s) Notes Reviewed:  [x ] YES  [ ] NO  Care Discussed with Consultants/Other Providers [ x] YES  [ ] NO    LABS:  CBC   10-12-23 @ 05:30  Hematcorit 37.1  Hemoglobin 10.9  Mean Cell Hemoglobin 29.9  Platelet Count-Automated 227  RBC Count 3.64  Red Cell Distrib Width 17.0  Wbc Count 7.93      BMP  10-12-23 @ 05:30  Anion Gap. Serum 14  Blood Urea Nitrogen,Serm 32  Calcium, Total Serum 8.1  Carbon Dioxide, Serum 28  Chloride, Serum 96  Creatinine, Serum 7.90  eGFR in  --  eGFR in Non Afican American --  Gloucose, serum 133  Potassium, Serum 4.6  Sodium, Serum 138              10-11-23 @ 05:30  Anion Gap. Serum 16  Blood Urea Nitrogen,Serm 52  Calcium, Total Serum 8.3  Carbon Dioxide, Serum 25  Chloride, Serum 96  Creatinine, Serum 11.65  eGFR in  --  eGFR in Non Afican American --  Gloucose, serum 150  Potassium, Serum 5.5  Sodium, Serum 137              10-10-23 @ 16:44  Anion Gap. Serum 17  Blood Urea Nitrogen,Serm 41  Calcium, Total Serum 9.1  Carbon Dioxide, Serum 25  Chloride, Serum 94  Creatinine, Serum 9.89  eGFR in  --  eGFR in Non Afican American --  Gloucose, serum 125  Potassium, Serum 5.0  Sodium, Serum 136              10-10-23 @ 05:30  Anion Gap. Serum 18  Blood Urea Nitrogen,Serm 36  Calcium, Total Serum 9.0  Carbon Dioxide, Serum 26  Chloride, Serum 96  Creatinine, Serum 9.13  eGFR in  --  eGFR in Non Afican American --  Gloucose, serum 116  Potassium, Serum 4.4  Sodium, Serum 140              10-09-23 @ 23:07  Anion Gap. Serum 17  Blood Urea Nitrogen,Serm 28  Calcium, Total Serum 9.2  Carbon Dioxide, Serum 27  Chloride, Serum 94  Creatinine, Serum 8.18  eGFR in  --  eGFR in Non Afican American --  Gloucose, serum 162  Potassium, Serum 4.3  Sodium, Serum 138                  CMP  10-12-23 @ 05:30  Pati Aminotransferase(ALT/SGPT)--  Albumin, Serum --  Alkaline Phosphatase, Serum --  Anion Gap, Serum 14  Aspartate Aminotransferase (AST/SGOT)--  Bilirubin Total, Serum --  Blood Urea Nitrogen, Serum 32  Calcium,Total Serum 8.1  Carbon Dioxide, Serum 28  Chloride, Serum 96  Creatinine, Serum 7.90  eGFR if  --  eGFR if Non African American --  Glucose, Serum 133  Potassium, Serum 4.6  Protein Total, Serum --  Sodium, Serum 138                      10-11-23 @ 05:30  Pati Aminotransferase(ALT/SGPT)--  Albumin, Serum --  Alkaline Phosphatase, Serum --  Anion Gap, Serum 16  Aspartate Aminotransferase (AST/SGOT)--  Bilirubin Total, Serum --  Blood Urea Nitrogen, Serum 52  Calcium,Total Serum 8.3  Carbon Dioxide, Serum 25  Chloride, Serum 96  Creatinine, Serum 11.65  eGFR if  --  eGFR if Non African American --  Glucose, Serum 150  Potassium, Serum 5.5  Protein Total, Serum --  Sodium, Serum 137                      10-10-23 @ 16:44  Pati Aminotransferase(ALT/SGPT)--  Albumin, Serum --  Alkaline Phosphatase, Serum --  Anion Gap, Serum 17  Aspartate Aminotransferase (AST/SGOT)--  Bilirubin Total, Serum --  Blood Urea Nitrogen, Serum 41  Calcium,Total Serum 9.1  Carbon Dioxide, Serum 25  Chloride, Serum 94  Creatinine, Serum 9.89  eGFR if  --  eGFR if Non African American --  Glucose, Serum 125  Potassium, Serum 5.0  Protein Total, Serum --  Sodium, Serum 136                      10-10-23 @ 05:30  Pati Aminotransferase(ALT/SGPT)<5  Albumin, Serum 3.6  Alkaline Phosphatase, Serum 124  Anion Gap, Serum 18  Aspartate Aminotransferase (AST/SGOT)15  Bilirubin Total, Serum 0.2  Blood Urea Nitrogen, Serum 36  Calcium,Total Serum 9.0  Carbon Dioxide, Serum 26  Chloride, Serum 96  Creatinine, Serum 9.13  eGFR if  --  eGFR if Non African American --  Glucose, Serum 116  Potassium, Serum 4.4  Protein Total, Serum 6.3  Sodium, Serum 140                      10-09-23 @ 23:07  Pati Aminotransferase(ALT/SGPT)<5  Albumin, Serum 3.8  Alkaline Phosphatase, Serum 133  Anion Gap, Serum 17  Aspartate Aminotransferase (AST/SGOT)17  Bilirubin Total, Serum 0.2  Blood Urea Nitrogen, Serum 28  Calcium,Total Serum 9.2  Carbon Dioxide, Serum 27  Chloride, Serum 94  Creatinine, Serum 8.18  eGFR if  --  eGFR if Non African American --  Glucose, Serum 162  Potassium, Serum 4.3  Protein Total, Serum 6.7  Sodium, Serum 138                          PT/INR  PT/INR  10-09-23 @ 23:07  INR 0.98  Prothrombin Time Comment --  Prothrobin Time, [id number]      Amylase/Lipase            RADIOLOGY & ADDITIONAL TESTS:    Imaging Personally Reviewed:  [ ] YES  [ ] NO

## 2023-10-12 NOTE — PROGRESS NOTE ADULT - PROBLEM SELECTOR PLAN 4
- HbA1c 5.1  - Monitor FS while on ISS - c/o sore throat and low grade temp on 10/12. Found to be COVID+ 10/12/23  - Continue Montelukast 10mg QD and supportive management  - Not a candidate for Paxlovid or Remdesivir given ESRD

## 2023-10-12 NOTE — PROGRESS NOTE ADULT - SUBJECTIVE AND OBJECTIVE BOX
CARDIOLOGY PA PROGRESS NOTE    Interval Events:    Overnight Events:    Subjective:    Tele:    MEDICATIONS:  amLODIPine   Tablet 10 milliGRAM(s) Oral daily  metoprolol tartrate 25 milliGRAM(s) Oral two times a day        acetaminophen     Tablet .. 650 milliGRAM(s) Oral every 6 hours PRN  gabapentin 100 milliGRAM(s) Oral <User Schedule>    pantoprazole    Tablet 40 milliGRAM(s) Oral before breakfast  polyethylene glycol 3350 17 Gram(s) Oral daily  senna 1 Tablet(s) Oral daily  simethicone 80 milliGRAM(s) Chew two times a day    atorvastatin 80 milliGRAM(s) Oral at bedtime  cinacalcet 60 milliGRAM(s) Oral daily  dextrose 50% Injectable 25 Gram(s) IV Push once  dextrose 50% Injectable 25 Gram(s) IV Push once  dextrose 50% Injectable 12.5 Gram(s) IV Push once  dextrose Oral Gel 15 Gram(s) Oral once PRN  glucagon  Injectable 1 milliGRAM(s) IntraMuscular once  insulin lispro (ADMELOG) corrective regimen sliding scale   SubCutaneous at bedtime  insulin lispro (ADMELOG) corrective regimen sliding scale   SubCutaneous three times a day before meals    aspirin enteric coated 81 milliGRAM(s) Oral daily  benzocaine/menthol Lozenge 1 Lozenge Oral two times a day PRN  clopidogrel Tablet 75 milliGRAM(s) Oral daily  dextrose 5%. 1000 milliLiter(s) IV Continuous <Continuous>  dextrose 5%. 1000 milliLiter(s) IV Continuous <Continuous>    	  VITAL SIGNS:  T(C): 36.8 (10-12-23 @ 08:33), Max: 37.9 (10-11-23 @ 17:28)  HR: 77 (10-12-23 @ 08:33) (71 - 87)  BP: 147/67 (10-12-23 @ 08:33) (112/60 - 147/67)  RR: 16 (10-12-23 @ 08:33) (16 - 18)  SpO2: 95% (10-12-23 @ 08:33) (93% - 99%)  Wt(kg): --    I&O's Summary    11 Oct 2023 07:01  -  12 Oct 2023 07:00  --------------------------------------------------------  IN: 180 mL / OUT: 1400 mL / NET: -1220 mL                                           PHYSICAL EXAM:  .pe    LABS:	 	                                  10.9   7.93  )-----------( 227      ( 12 Oct 2023 05:30 )             37.1     10-12    138  |  96  |  32<H>  ----------------------------<  133<H>  4.6   |  28  |  7.90<H>    Ca    8.1<L>      12 Oct 2023 05:30  Phos  5.8     10-12  Mg     2.2     10-12      proBNP:   Lipid Profile:   HgA1c:   TSH:       IMAGING/DATA:   CARDIOLOGY PA PROGRESS NOTE    Interval Events:  - Awaiting shelter placement     Overnight Events:   - Experiencing low-grade fever of 99-100F; Given Tylenol; otherwise HDS    Subjective:  - Pt seen and examined this AM sitting comfortably in bed. Feels well, but endorsing some sore throat; no acute complaints of CP, palpitations, SOB, abdominal discomfort, N/V/D    ROS: Negative except per HPI and Subjective     Tele: NSR; no acute events     MEDICATIONS:  amLODIPine   Tablet 10 milliGRAM(s) Oral daily  metoprolol tartrate 25 milliGRAM(s) Oral two times a day  acetaminophen     Tablet .. 650 milliGRAM(s) Oral every 6 hours PRN  gabapentin 100 milliGRAM(s) Oral <User Schedule>  pantoprazole    Tablet 40 milliGRAM(s) Oral before breakfast  polyethylene glycol 3350 17 Gram(s) Oral daily  senna 1 Tablet(s) Oral daily  simethicone 80 milliGRAM(s) Chew two times a day  atorvastatin 80 milliGRAM(s) Oral at bedtime  cinacalcet 60 milliGRAM(s) Oral daily  dextrose 50% Injectable 25 Gram(s) IV Push once  dextrose 50% Injectable 25 Gram(s) IV Push once  dextrose 50% Injectable 12.5 Gram(s) IV Push once  dextrose Oral Gel 15 Gram(s) Oral once PRN  glucagon  Injectable 1 milliGRAM(s) IntraMuscular once  insulin lispro (ADMELOG) corrective regimen sliding scale   SubCutaneous at bedtime  insulin lispro (ADMELOG) corrective regimen sliding scale   SubCutaneous three times a day before meals  aspirin enteric coated 81 milliGRAM(s) Oral daily  benzocaine/menthol Lozenge 1 Lozenge Oral two times a day PRN  clopidogrel Tablet 75 milliGRAM(s) Oral daily  dextrose 5%. 1000 milliLiter(s) IV Continuous <Continuous>  dextrose 5%. 1000 milliLiter(s) IV Continuous <Continuous>    VITAL SIGNS:  T(C): 36.8 (10-12-23 @ 08:33), Max: 37.9 (10-11-23 @ 17:28)  HR: 77 (10-12-23 @ 08:33) (71 - 87)  BP: 147/67 (10-12-23 @ 08:33) (112/60 - 147/67)  RR: 16 (10-12-23 @ 08:33) (16 - 18)  SpO2: 95% (10-12-23 @ 08:33) (93% - 99%)  Wt(kg): --    I&O's Summary    11 Oct 2023 07:01  -  12 Oct 2023 07:00  --------------------------------------------------------  IN: 180 mL / OUT: 1400 mL / NET: -1220 mL                                     PHYSICAL EXAM:  HENT: NCAT, EOMI, PEERLA  CV: RRR, S1/S2. No murmurs, rubs or gallops   PULM: CTA B/L. No wheezing, rales, or rhonchi   ABD: Soft, NT/ND, +BS throughout   EXT: Warm, no LE edema  NEURO: AOx3; no focal neuro deficits      LABS:	 	                    10.9   7.93  )-----------( 227      ( 12 Oct 2023 05:30 )             37.1     10-12    138  |  96  |  32<H>  ----------------------------<  133<H>  4.6   |  28  |  7.90<H>    Ca    8.1<L>      12 Oct 2023 05:30  Phos  5.8     10-12  Mg     2.2     10-12

## 2023-10-13 LAB
ALBUMIN SERPL ELPH-MCNC: 3.2 G/DL — LOW (ref 3.3–5)
ALP SERPL-CCNC: 107 U/L — SIGNIFICANT CHANGE UP (ref 40–120)
ALT FLD-CCNC: 11 U/L — SIGNIFICANT CHANGE UP (ref 10–45)
ANION GAP SERPL CALC-SCNC: 19 MMOL/L — HIGH (ref 5–17)
AST SERPL-CCNC: 33 U/L — SIGNIFICANT CHANGE UP (ref 10–40)
BILIRUB DIRECT SERPL-MCNC: <0.2 MG/DL — SIGNIFICANT CHANGE UP (ref 0–0.3)
BILIRUB INDIRECT FLD-MCNC: SIGNIFICANT CHANGE UP MG/DL (ref 0.2–1)
BILIRUB SERPL-MCNC: 0.2 MG/DL — SIGNIFICANT CHANGE UP (ref 0.2–1.2)
BUN SERPL-MCNC: 55 MG/DL — HIGH (ref 7–23)
CALCIUM SERPL-MCNC: 8.6 MG/DL — SIGNIFICANT CHANGE UP (ref 8.4–10.5)
CHLORIDE SERPL-SCNC: 92 MMOL/L — LOW (ref 96–108)
CK MB CFR SERPL CALC: 2.1 NG/ML — SIGNIFICANT CHANGE UP (ref 0–6.7)
CK SERPL-CCNC: 54 U/L — SIGNIFICANT CHANGE UP (ref 25–170)
CO2 SERPL-SCNC: 24 MMOL/L — SIGNIFICANT CHANGE UP (ref 22–31)
CREAT SERPL-MCNC: 10.84 MG/DL — HIGH (ref 0.5–1.3)
EGFR: 3 ML/MIN/1.73M2 — LOW
GLUCOSE BLDC GLUCOMTR-MCNC: 119 MG/DL — HIGH (ref 70–99)
GLUCOSE BLDC GLUCOMTR-MCNC: 125 MG/DL — HIGH (ref 70–99)
GLUCOSE BLDC GLUCOMTR-MCNC: 138 MG/DL — HIGH (ref 70–99)
GLUCOSE BLDC GLUCOMTR-MCNC: 158 MG/DL — HIGH (ref 70–99)
GLUCOSE BLDC GLUCOMTR-MCNC: 163 MG/DL — HIGH (ref 70–99)
GLUCOSE BLDC GLUCOMTR-MCNC: 166 MG/DL — HIGH (ref 70–99)
GLUCOSE BLDC GLUCOMTR-MCNC: 253 MG/DL — HIGH (ref 70–99)
GLUCOSE SERPL-MCNC: 119 MG/DL — HIGH (ref 70–99)
HCT VFR BLD CALC: 34.4 % — LOW (ref 34.5–45)
HGB BLD-MCNC: 10.4 G/DL — LOW (ref 11.5–15.5)
LACTATE SERPL-SCNC: 0.6 MMOL/L — SIGNIFICANT CHANGE UP (ref 0.5–2)
MAGNESIUM SERPL-MCNC: 2.2 MG/DL — SIGNIFICANT CHANGE UP (ref 1.6–2.6)
MCHC RBC-ENTMCNC: 29.9 PG — SIGNIFICANT CHANGE UP (ref 27–34)
MCHC RBC-ENTMCNC: 30.2 GM/DL — LOW (ref 32–36)
MCV RBC AUTO: 98.9 FL — SIGNIFICANT CHANGE UP (ref 80–100)
NRBC # BLD: 0 /100 WBCS — SIGNIFICANT CHANGE UP (ref 0–0)
PHOSPHATE SERPL-MCNC: 6.4 MG/DL — HIGH (ref 2.5–4.5)
PLATELET # BLD AUTO: 187 K/UL — SIGNIFICANT CHANGE UP (ref 150–400)
POTASSIUM SERPL-MCNC: 5 MMOL/L — SIGNIFICANT CHANGE UP (ref 3.5–5.3)
POTASSIUM SERPL-SCNC: 5 MMOL/L — SIGNIFICANT CHANGE UP (ref 3.5–5.3)
PROT SERPL-MCNC: 6.4 G/DL — SIGNIFICANT CHANGE UP (ref 6–8.3)
RBC # BLD: 3.48 M/UL — LOW (ref 3.8–5.2)
RBC # FLD: 16.7 % — HIGH (ref 10.3–14.5)
SODIUM SERPL-SCNC: 135 MMOL/L — SIGNIFICANT CHANGE UP (ref 135–145)
TROPONIN T, HIGH SENSITIVITY RESULT: 2473 NG/L — CRITICAL HIGH (ref 0–51)
WBC # BLD: 9.03 K/UL — SIGNIFICANT CHANGE UP (ref 3.8–10.5)
WBC # FLD AUTO: 9.03 K/UL — SIGNIFICANT CHANGE UP (ref 3.8–10.5)

## 2023-10-13 PROCEDURE — 93010 ELECTROCARDIOGRAM REPORT: CPT

## 2023-10-13 PROCEDURE — 99233 SBSQ HOSP IP/OBS HIGH 50: CPT

## 2023-10-13 PROCEDURE — 99231 SBSQ HOSP IP/OBS SF/LOW 25: CPT | Mod: GC

## 2023-10-13 PROCEDURE — 99232 SBSQ HOSP IP/OBS MODERATE 35: CPT

## 2023-10-13 RX ORDER — REMDESIVIR 5 MG/ML
100 INJECTION INTRAVENOUS EVERY 24 HOURS
Refills: 0 | Status: COMPLETED | OUTPATIENT
Start: 2023-10-14 | End: 2023-10-17

## 2023-10-13 RX ORDER — ACETAMINOPHEN 500 MG
1000 TABLET ORAL ONCE
Refills: 0 | Status: DISCONTINUED | OUTPATIENT
Start: 2023-10-13 | End: 2023-10-16

## 2023-10-13 RX ORDER — REMDESIVIR 5 MG/ML
INJECTION INTRAVENOUS
Refills: 0 | Status: COMPLETED | OUTPATIENT
Start: 2023-10-13 | End: 2023-10-17

## 2023-10-13 RX ORDER — NITROGLYCERIN 6.5 MG
0.4 CAPSULE, EXTENDED RELEASE ORAL ONCE
Refills: 0 | Status: DISCONTINUED | OUTPATIENT
Start: 2023-10-13 | End: 2023-10-22

## 2023-10-13 RX ORDER — REMDESIVIR 5 MG/ML
200 INJECTION INTRAVENOUS EVERY 24 HOURS
Refills: 0 | Status: COMPLETED | OUTPATIENT
Start: 2023-10-13 | End: 2023-10-13

## 2023-10-13 RX ADMIN — Medication 25 MILLIGRAM(S): at 05:53

## 2023-10-13 RX ADMIN — Medication 1: at 13:59

## 2023-10-13 RX ADMIN — Medication 25 MILLIGRAM(S): at 17:32

## 2023-10-13 RX ADMIN — CINACALCET 60 MILLIGRAM(S): 30 TABLET, FILM COATED ORAL at 15:21

## 2023-10-13 RX ADMIN — AMLODIPINE BESYLATE 10 MILLIGRAM(S): 2.5 TABLET ORAL at 05:53

## 2023-10-13 RX ADMIN — POLYETHYLENE GLYCOL 3350 17 GRAM(S): 17 POWDER, FOR SOLUTION ORAL at 15:20

## 2023-10-13 RX ADMIN — Medication 1: at 17:31

## 2023-10-13 RX ADMIN — SIMETHICONE 80 MILLIGRAM(S): 80 TABLET, CHEWABLE ORAL at 17:32

## 2023-10-13 RX ADMIN — MONTELUKAST 10 MILLIGRAM(S): 4 TABLET, CHEWABLE ORAL at 17:32

## 2023-10-13 RX ADMIN — Medication 100 MILLIGRAM(S): at 05:51

## 2023-10-13 RX ADMIN — Medication 100 MILLIGRAM(S): at 15:21

## 2023-10-13 RX ADMIN — Medication 650 MILLIGRAM(S): at 05:54

## 2023-10-13 RX ADMIN — SIMETHICONE 80 MILLIGRAM(S): 80 TABLET, CHEWABLE ORAL at 05:53

## 2023-10-13 RX ADMIN — SENNA PLUS 1 TABLET(S): 8.6 TABLET ORAL at 15:21

## 2023-10-13 RX ADMIN — ATORVASTATIN CALCIUM 80 MILLIGRAM(S): 80 TABLET, FILM COATED ORAL at 21:46

## 2023-10-13 RX ADMIN — GABAPENTIN 100 MILLIGRAM(S): 400 CAPSULE ORAL at 21:46

## 2023-10-13 RX ADMIN — Medication 81 MILLIGRAM(S): at 15:21

## 2023-10-13 RX ADMIN — CLOPIDOGREL BISULFATE 75 MILLIGRAM(S): 75 TABLET, FILM COATED ORAL at 15:21

## 2023-10-13 RX ADMIN — Medication 650 MILLIGRAM(S): at 21:46

## 2023-10-13 RX ADMIN — PANTOPRAZOLE SODIUM 40 MILLIGRAM(S): 20 TABLET, DELAYED RELEASE ORAL at 05:51

## 2023-10-13 RX ADMIN — Medication 650 MILLIGRAM(S): at 07:16

## 2023-10-13 RX ADMIN — REMDESIVIR 200 MILLIGRAM(S): 5 INJECTION INTRAVENOUS at 15:21

## 2023-10-13 NOTE — CHART NOTE - NSCHARTNOTEFT_GEN_A_CORE
Admitting Diagnosis:   Patient is a 73y old  Female who presents with a chief complaint of NSTEMI (12 Oct 2023 11:46)      PAST MEDICAL & SURGICAL HISTORY:  ESRD on dialysis      DM (diabetes mellitus)      Hypertension      CAD (coronary artery disease)      Gout      Diabetes mellitus      Hypertension      Mild hypercholesterolemia      ESRD on dialysis      CAD (coronary artery disease)      Colitis      H/O heart artery stent      No significant past surgical history          Current Nutrition Order:  Renal, Consistent Carbohydrate Diet w/o snack, DASH, Nepro 1x/day (425 kcal, 19g pro)    PO Intake: Good (%) [   ]  Fair (50-75%) [   ] Poor (<25%) [   ]- intake fluctuating     GI Issues: No complaints of N/V, +constipation, ordered for bowel regimen    Pain: No complaints of pain at time of visit     Skin Integrity: Camilo 20, no edema  B/L groin surgical wounds     Labs:   10-13    135  |  92<L>  |  55<H>  ----------------------------<  119<H>  5.0   |  24  |  10.84<H>    Ca    8.6      13 Oct 2023 05:30  Phos  6.4     10-  Mg     2.2     10-    TPro  6.4  /  Alb  3.2<L>  /  TBili  0.2  /  DBili  <0.2  /  AST  33  /  ALT  11  /  AlkPhos  107  10-    CAPILLARY BLOOD GLUCOSE      POCT Blood Glucose.: 158 mg/dL (13 Oct 2023 13:54)  POCT Blood Glucose.: 253 mg/dL (13 Oct 2023 11:34)  POCT Blood Glucose.: 138 mg/dL (13 Oct 2023 06:56)  POCT Blood Glucose.: 143 mg/dL (12 Oct 2023 21:36)  POCT Blood Glucose.: 155 mg/dL (12 Oct 2023 16:19)      Medications:  MEDICATIONS  (STANDING):  acetaminophen   IVPB .. 1000 milliGRAM(s) IV Intermittent once  amLODIPine   Tablet 10 milliGRAM(s) Oral daily  aspirin enteric coated 81 milliGRAM(s) Oral daily  atorvastatin 80 milliGRAM(s) Oral at bedtime  cinacalcet 60 milliGRAM(s) Oral daily  clopidogrel Tablet 75 milliGRAM(s) Oral daily  dextrose 5%. 1000 milliLiter(s) (50 mL/Hr) IV Continuous <Continuous>  dextrose 5%. 1000 milliLiter(s) (100 mL/Hr) IV Continuous <Continuous>  dextrose 50% Injectable 12.5 Gram(s) IV Push once  dextrose 50% Injectable 25 Gram(s) IV Push once  dextrose 50% Injectable 25 Gram(s) IV Push once  gabapentin 100 milliGRAM(s) Oral <User Schedule>  glucagon  Injectable 1 milliGRAM(s) IntraMuscular once  insulin lispro (ADMELOG) corrective regimen sliding scale   SubCutaneous three times a day before meals  insulin lispro (ADMELOG) corrective regimen sliding scale   SubCutaneous at bedtime  metoprolol tartrate 25 milliGRAM(s) Oral two times a day  montelukast 10 milliGRAM(s) Oral daily  nitroglycerin     SubLingual 0.4 milliGRAM(s) SubLingual once  pantoprazole    Tablet 40 milliGRAM(s) Oral before breakfast  polyethylene glycol 3350 17 Gram(s) Oral daily  remdesivir  IVPB   IV Intermittent   remdesivir  IVPB 200 milliGRAM(s) IV Intermittent every 24 hours  senna 1 Tablet(s) Oral daily  simethicone 80 milliGRAM(s) Chew two times a day    MEDICATIONS  (PRN):  acetaminophen     Tablet .. 650 milliGRAM(s) Oral every 6 hours PRN Temp greater or equal to 38C (100.4F)  benzocaine/menthol Lozenge 1 Lozenge Oral two times a day PRN Sore Throat  dextrose Oral Gel 15 Gram(s) Oral once PRN Blood Glucose LESS THAN 70 milliGRAM(s)/deciliter  guaiFENesin Oral Liquid (Sugar-Free) 100 milliGRAM(s) Oral every 6 hours PRN Cough      Weight:  Daily Weight in k.5 (dry, 10/13)  74kg (10/11, dry)    Weight Change: slight wt fluctuations 2/2 fluid shifts from HD     Nutrition Focused Physical Exam: Completed [   ]  Not Pertinent [ X  ]    Estimated energy needs:   Height for BMI (FEET)	5 Feet  Height for BMI (INCHES)	5 Inch(s)  Height for BMI (CENTIMETERS)	165.1 Centimeter(s)  Weight for BMI (lbs)	163 lb  Weight for BMI (kg)	73.9 kg  Body Mass Index	27.1  Ideal body weight used for calculations as pt >120% of IBW. Needs estimated for age and adjusted for ESRD on HD. Fluids per team 2/2 HD.     Estimated Energy Needs Weight (lbs)	125 lb  Estimated Energy Needs Weight (kg)	56.6 kg  Estimated Energy Needs From (riya/kg)	25  Estimated Energy Needs To (riya/kg)	30  Estimated Energy Needs Calculated From (riya/kg)	1415  Estimated Energy Needs Calculated To (riya/kg)	1698    Estimated Protein Needs Weight (lbs)	125 lb  Estimated Protein Needs Weight (kg)	56.6 kg  Estimated Protein Needs From (g/kg)	1.2  Estimated Protein Needs To (g/kg)	1.4  Estimated Protein Needs Calculated From (g/kg)	67.92  Estimated Protein Needs Calculated To (g/kg)	79.24    Subjective: 72 yo female, with PMHx of ESRD/HD via left arm fistula (MWF), HTN, DM-II, CAD s/p PCI , gout, and fibroids, recently treated in August for Colitis in August, presented to [facility] 10/7/23 complaining of sudden onset of lower abdominal pain at 1AM, 10/10 in severity. She endorsed associated palpitations and vomiting but denied any dizziness, chest pain, diaphoresis, recent sick contacts. Found to have NSTEMI. Pt S/p cardiac cath 10/8/23 with Dr. Wilson revealing 3VCAD. LM minimal LI, severely calcified. mLAD 75% severely calcified, tortous. pLCx 70%, tortous. pRCA 90% at edge of previous stent, tortous. Planned for PCI in 5 weeks. Pt with left groin ooze s/p cardiac cath today; found with hematoma superior to access site 7 F sheath in place. Course c/b +COVID test.     Pt seen in room during HD, awake, alert, pleasant. Breathing on room air. She reports fair appetite but dislikes the food and is declining many options. Noted restrictive diet, though necessary given her comorbidities. Encouraged intake of Nepro shakes. No complaints of N/V. Last BM 10/8- ordered for bowel regimen. No complaints of pain. Tmax 101.2F this morning, now 98F. Denied cough, sore throat, or rhinorrhea. Pertinent nutrition labs: POC , 143, 155, 267mg/dL, K 5.0, BUN 55/Cr 10.84 (H), Phos 6.4 (H). Will continue to follow per RD protocol.     Previous Nutrition Diagnosis:  Increased Nutrient Needs RT increased demands AEB: ESRD/HD, decreased PO PTA    Active [ X  ]  Resolved [   ]    If resolved, new PES:     Goal: Pt will be encouraged to consume >50% of majority of meals.    Recommendations:  1. Continue with current diet and increase Nepro to BID   2. Monitor lytes and replete prn. POC BG q6hrs   3. Pain and bowel regimens per team   4. Trend dry wts   5. Reinforce diet ed as appropriate     Education: encouraged PO intake as tolerated, discussed importance of adequate PO intake. menu reviewed     Risk Level: High [ X  ] Moderate [   ] Low [   ]

## 2023-10-13 NOTE — PROGRESS NOTE ADULT - NS ATTEND AMEND GEN_ALL_CORE FT
72 yo female, residing in shelter, with PMHx of ESRD on HD via left arm fistula (MWF), HTN, DM-II, CAD s/p PCI 2017, gout, and fibroids, admitted for NSTEMI.     1. CAD/NSTEMI type 1  S/p cardiac cath on 10/8/23 with Dr. Wilson revealing 3VCAD. LM minimal LI, severely calcified. mLAD 75% severly calcified, tortous. pLCx 70%, tortous. pRCA 90% at edge of previous stent, tortous.   Continue aspirin 81 mg and Plavix 75 mg. Atorvastatin 80 mg.  Staged PCI as an outpatient for LCx RCA.    2. ESRD HD  HD per renal.    3. Hypertension  Continue amlodipine 10 mg and lopressor 25 mg bid.    4. COVID +  Tested + COVID, Sats ok, redemisivir per medicine.    Disposition pending covid resolution -shelter placement.

## 2023-10-13 NOTE — PROGRESS NOTE ADULT - ASSESSMENT
73F ESRD admitted for NSTEMI s/p cardiac cath, now h/c c/b COVID PNA, electrolytes stable and acceptable volume status, iHD 10/13 as per schedule, continue managing in patient HD       ESRD  Stable lytes and volume status   EDW: TBD     Plan:  HD today as below  Hemodialysis Treatment.:     Schedule: Once, Modality: Hemodialysis, Access: Arteriovenous Fistula    Dialyzer: Optiflux [id number], Time: 210 Min    Blood Flow: 400 mL/Min , Dialysate Flow: 500 mL/Min, Dialysate Temp: 36.5, Tubinmm (Adult)    Target Fluid Removal: 2Liters    Dialysate Electrolytes (mEq/L): Potassium 2, Calcium 2.5, Sodium 138, Bicarbonate 35   Daily BMP   Okay to give Full dose Remdesivir  Next HD if still admitted 10/16    Access:  LUE AVF functional     HTN:  BP stable  Hold meds on HD days for optimal UF, can resume post HD if BP still elevated for better control   UF with HD as tolerated     Anemia:  Hgb at goal 10.4  EPO with HD     MBD:  Calcium: 8.6  Phos: 6.4           73F ESRD admitted for NSTEMI s/p cardiac cath, now h/c c/b COVID PNA, electrolytes stable and acceptable volume status, iHD 10/13 as per schedule, continue managing in patient HD       ESRD  Stable lytes and volume status   EDW: TBD     Plan:  HD today as below  Hemodialysis Treatment.:     Schedule: Once, Modality: Hemodialysis, Access: Arteriovenous Fistula    Dialyzer: Optiflux [id number], Time: 210 Min    Blood Flow: 400 mL/Min , Dialysate Flow: 500 mL/Min, Dialysate Temp: 36.5, Tubinmm (Adult)    Target Fluid Removal: 1Liters    Dialysate Electrolytes (mEq/L): Potassium 2, Calcium 2.5, Sodium 138, Bicarbonate 35   Daily BMP   Okay to give Full dose Remdesivir  Next HD if still admitted 10/16    Access:  LUE AVF functional     HTN:  BP stable  Hold meds on HD days for optimal UF, can resume post HD if BP still elevated for better control   UF with HD as tolerated     Anemia:  Hgb at goal 10.4  EPO with HD     MBD:  Calcium: 8.6  Phos: 6.4           73F ESRD admitted for NSTEMI s/p cardiac cath, now h/c c/b COVID PNA, electrolytes stable and acceptable volume status, iHD 10/13 as per schedule, continue managing in patient HD       ESRD  Stable lytes and volume status   EDW: 74kg     Plan:  HD today as below  Hemodialysis Treatment.:     Schedule: Once, Modality: Hemodialysis, Access: Arteriovenous Fistula    Dialyzer: Optiflux [id number], Time: 210 Min    Blood Flow: 400 mL/Min , Dialysate Flow: 500 mL/Min, Dialysate Temp: 36.5, Tubinmm (Adult)    Target Fluid Removal: 1Liters    Dialysate Electrolytes (mEq/L): Potassium 2, Calcium 2.5, Sodium 138, Bicarbonate 35   Daily BMP   Okay to give Full dose Remdesivir  Next HD if still admitted 10/16    Access:  LUE AVF functional     HTN:  BP stable  Hold meds on HD days for optimal UF, can resume post HD if BP still elevated for better control   UF with HD as tolerated     Anemia:  Hgb at goal 10.4  EPO with HD     MBD:  Calcium: 8.6  Phos: 6.4

## 2023-10-13 NOTE — PROGRESS NOTE ADULT - SUBJECTIVE AND OBJECTIVE BOX
Cardiology PA Adult Progress Note    SUBJECTIVE ASSESSMENT: Overnight no acute events; Patient laying comfortably in bed. Currently denies CP, dizziness, palpitations, SOB, dyspnea, coughing, headaches, N/V/D/abdominal pain. Patient understands plan for the day.   	  MEDICATIONS:  amLODIPine   Tablet 10 milliGRAM(s) Oral daily  metoprolol tartrate 25 milliGRAM(s) Oral two times a day      guaiFENesin Oral Liquid (Sugar-Free) 100 milliGRAM(s) Oral every 6 hours PRN  montelukast 10 milliGRAM(s) Oral daily    acetaminophen     Tablet .. 650 milliGRAM(s) Oral every 6 hours PRN  gabapentin 100 milliGRAM(s) Oral <User Schedule>    pantoprazole    Tablet 40 milliGRAM(s) Oral before breakfast  polyethylene glycol 3350 17 Gram(s) Oral daily  senna 1 Tablet(s) Oral daily  simethicone 80 milliGRAM(s) Chew two times a day    atorvastatin 80 milliGRAM(s) Oral at bedtime  cinacalcet 60 milliGRAM(s) Oral daily  dextrose 50% Injectable 25 Gram(s) IV Push once  dextrose 50% Injectable 25 Gram(s) IV Push once  dextrose 50% Injectable 12.5 Gram(s) IV Push once  dextrose Oral Gel 15 Gram(s) Oral once PRN  glucagon  Injectable 1 milliGRAM(s) IntraMuscular once  insulin lispro (ADMELOG) corrective regimen sliding scale   SubCutaneous at bedtime  insulin lispro (ADMELOG) corrective regimen sliding scale   SubCutaneous three times a day before meals    aspirin enteric coated 81 milliGRAM(s) Oral daily  benzocaine/menthol Lozenge 1 Lozenge Oral two times a day PRN  clopidogrel Tablet 75 milliGRAM(s) Oral daily  dextrose 5%. 1000 milliLiter(s) IV Continuous <Continuous>  dextrose 5%. 1000 milliLiter(s) IV Continuous <Continuous>    	  VITAL SIGNS:  T(C): 36.7 (10-13-23 @ 09:05), Max: 38.9 (10-12-23 @ 15:44)  HR: 75 (10-13-23 @ 09:05) (72 - 97)  BP: 124/60 (10-13-23 @ 09:05) (119/58 - 156/70)  RR: 18 (10-13-23 @ 09:05) (16 - 18)  SpO2: 93% (10-13-23 @ 09:05) (92% - 97%)  Wt(kg): --    I&O's Summary    12 Oct 2023 07:01  -  13 Oct 2023 07:00  --------------------------------------------------------  IN: 0 mL / OUT: 0 mL / NET: 0 mL    13 Oct 2023 07:01  -  13 Oct 2023 11:34  --------------------------------------------------------  IN: 225 mL / OUT: 0 mL / NET: 225 mL                                           PHYSICAL EXAM:  Appearance: Normal	  HEENT: Normal oral mucosa, PERRL, EOMI	  Neck: Supple, + JVD/ - JVD; ___ Carotid Bruit   Cardiovascular: Normal S1 S2, No murmurs  Respiratory: Lungs clear to auscultation/Decreased Breath Sounds/No Rales, Rhonchi, Wheezing	  Gastrointestinal:  Soft, Non-tender, + BS	  Skin: No rashes, No ecchymoses, No cyanosis  Extremities: Normal range of motion, No clubbing, cyanosis or edema  Vascular: Peripheral pulses palpable 2+ bilaterally  Neurologic: Non-focal  Psychiatry: A & O x 3, Mood & affect appropriate    LABS:	 	                                  10.4   9.03  )-----------( 187      ( 13 Oct 2023 05:30 )             34.4     10-13    135  |  92<L>  |  55<H>  ----------------------------<  119<H>  5.0   |  24  |  10.84<H>    Ca    8.6      13 Oct 2023 05:30  Phos  6.4     10-13  Mg     2.2     10-13      proBNP:   Lipid Profile:   HgA1c:   TSH:    Cardiology PA Adult Progress Note    SUBJECTIVE ASSESSMENT: Overnight no acute events; Patient laying comfortably in bed. Reports feeling better. Surprised about COVID diagnosis and nervous about it. Still experiencing mild coughing and some SOB, but otherwise denies CP, dizziness, palpitations, headaches, N/V/D/abdominal pain. Patient understands plan for the day.   	  MEDICATIONS:  amLODIPine   Tablet 10 milliGRAM(s) Oral daily  metoprolol tartrate 25 milliGRAM(s) Oral two times a day  guaiFENesin Oral Liquid (Sugar-Free) 100 milliGRAM(s) Oral every 6 hours PRN  montelukast 10 milliGRAM(s) Oral daily  acetaminophen     Tablet .. 650 milliGRAM(s) Oral every 6 hours PRN  gabapentin 100 milliGRAM(s) Oral <User Schedule>  pantoprazole    Tablet 40 milliGRAM(s) Oral before breakfast  polyethylene glycol 3350 17 Gram(s) Oral daily  senna 1 Tablet(s) Oral daily  simethicone 80 milliGRAM(s) Chew two times a day  atorvastatin 80 milliGRAM(s) Oral at bedtime  cinacalcet 60 milliGRAM(s) Oral daily  dextrose 50% Injectable 25 Gram(s) IV Push once  dextrose 50% Injectable 25 Gram(s) IV Push once  dextrose 50% Injectable 12.5 Gram(s) IV Push once  dextrose Oral Gel 15 Gram(s) Oral once PRN  glucagon  Injectable 1 milliGRAM(s) IntraMuscular once  insulin lispro (ADMELOG) corrective regimen sliding scale   SubCutaneous at bedtime  insulin lispro (ADMELOG) corrective regimen sliding scale   SubCutaneous three times a day before meals  aspirin enteric coated 81 milliGRAM(s) Oral daily  benzocaine/menthol Lozenge 1 Lozenge Oral two times a day PRN  clopidogrel Tablet 75 milliGRAM(s) Oral daily  dextrose 5%. 1000 milliLiter(s) IV Continuous <Continuous>  dextrose 5%. 1000 milliLiter(s) IV Continuous <Continuous>    	  VITAL SIGNS:  T(C): 36.7 (10-13-23 @ 09:05), Max: 38.9 (10-12-23 @ 15:44)  HR: 75 (10-13-23 @ 09:05) (72 - 97)  BP: 124/60 (10-13-23 @ 09:05) (119/58 - 156/70)  RR: 18 (10-13-23 @ 09:05) (16 - 18)  SpO2: 93% (10-13-23 @ 09:05) (92% - 97%)  Wt(kg): --    I&O's Summary    12 Oct 2023 07:01  -  13 Oct 2023 07:00  --------------------------------------------------------  IN: 0 mL / OUT: 0 mL / NET: 0 mL    13 Oct 2023 07:01  -  13 Oct 2023 11:34  --------------------------------------------------------  IN: 225 mL / OUT: 0 mL / NET: 225 mL                                           PHYSICAL EXAM:  Appearance: Normal	  HEENT: Normal oral mucosa, PERRL, EOMI	  Neck: Supple, - JVD; no Carotid Bruit   Cardiovascular: Normal S1 S2, No murmurs  Respiratory: Lungs clear to auscultation, No Rales, Rhonchi, Wheezing	  Gastrointestinal:  Soft, Non-tender, + BS	  Skin: No rashes, No ecchymoses, No cyanosis  Extremities: Normal range of motion, No clubbing, cyanosis or edema  Vascular: Peripheral pulses palpable 2+ bilaterally  Neurologic: Non-focal  Psychiatry: A & O x 3, Mood & affect appropriate    LABS:	 	                        10.4   9.03  )-----------( 187      ( 13 Oct 2023 05:30 )             34.4     10-13    135  |  92<L>  |  55<H>  ----------------------------<  119<H>  5.0   |  24  |  10.84<H>    Ca    8.6      13 Oct 2023 05:30  Phos  6.4     10-13  Mg     2.2     10-13

## 2023-10-13 NOTE — PROGRESS NOTE ADULT - SUBJECTIVE AND OBJECTIVE BOX
OVERNIGHT EVENTS: PREMA - febrile 89F    SUBJECTIVE:  Patient seen and examined at bedside. Reports feeling much better compared to yesterday, denies subjective fever, cough, myalgias. Reports cough has resolved.    Vital Signs Last 12 Hrs  T(F): 98 (10-13-23 @ 09:05), Max: 101.2 (10-13-23 @ 05:51)  HR: 75 (10-13-23 @ 09:05) (75 - 97)  BP: 124/60 (10-13-23 @ 09:05) (124/60 - 156/70)  BP(mean): 86 (10-13-23 @ 09:05) (86 - 100)  RR: 18 (10-13-23 @ 09:05) (18 - 18)  SpO2: 93% (10-13-23 @ 09:05) (92% - 93%)  I&O's Summary    12 Oct 2023 07:01  -  13 Oct 2023 07:00  --------------------------------------------------------  IN: 0 mL / OUT: 0 mL / NET: 0 mL    13 Oct 2023 07:01  -  13 Oct 2023 11:35  --------------------------------------------------------  IN: 225 mL / OUT: 0 mL / NET: 225 mL      PHYSICAL EXAM:  Constitutional: NAD, comfortable in bed.  HEENT: NC/AT, PERRLA, EOMI, no conjunctival pallor or scleral icterus, MMM  Neck: Supple, no JVD  Respiratory: CTA B/L. No w/r/r.   Cardiovascular: RRR, normal S1 and S2, no m/r/g.   Gastrointestinal: +BS, soft NTND, no guarding or rebound tenderness, no palpable masses   Extremities: wwp; no cyanosis, clubbing or edema.   Vascular: LUE AVF. Pulses equal and strong throughout.   Neurological: AAOx3, no CN deficits, strength and sensation intact throughout.   Skin: No gross skin abnormalities or rashes      LABS:                        10.4   9.03  )-----------( 187      ( 13 Oct 2023 05:30 )             34.4     10-13    135  |  92<L>  |  55<H>  ----------------------------<  119<H>  5.0   |  24  |  10.84<H>    Ca    8.6      13 Oct 2023 05:30  Phos  6.4     10-13  Mg     2.2     10-13        Urinalysis Basic - ( 13 Oct 2023 05:30 )    Color: x / Appearance: x / SG: x / pH: x  Gluc: 119 mg/dL / Ketone: x  / Bili: x / Urobili: x   Blood: x / Protein: x / Nitrite: x   Leuk Esterase: x / RBC: x / WBC x   Sq Epi: x / Non Sq Epi: x / Bacteria: x      RADIOLOGY & ADDITIONAL TESTS:    MEDICATIONS  (STANDING):  amLODIPine   Tablet 10 milliGRAM(s) Oral daily  aspirin enteric coated 81 milliGRAM(s) Oral daily  atorvastatin 80 milliGRAM(s) Oral at bedtime  cinacalcet 60 milliGRAM(s) Oral daily  clopidogrel Tablet 75 milliGRAM(s) Oral daily  dextrose 5%. 1000 milliLiter(s) (50 mL/Hr) IV Continuous <Continuous>  dextrose 5%. 1000 milliLiter(s) (100 mL/Hr) IV Continuous <Continuous>  dextrose 50% Injectable 25 Gram(s) IV Push once  dextrose 50% Injectable 25 Gram(s) IV Push once  dextrose 50% Injectable 12.5 Gram(s) IV Push once  gabapentin 100 milliGRAM(s) Oral <User Schedule>  glucagon  Injectable 1 milliGRAM(s) IntraMuscular once  insulin lispro (ADMELOG) corrective regimen sliding scale   SubCutaneous three times a day before meals  insulin lispro (ADMELOG) corrective regimen sliding scale   SubCutaneous at bedtime  metoprolol tartrate 25 milliGRAM(s) Oral two times a day  montelukast 10 milliGRAM(s) Oral daily  pantoprazole    Tablet 40 milliGRAM(s) Oral before breakfast  polyethylene glycol 3350 17 Gram(s) Oral daily  senna 1 Tablet(s) Oral daily  simethicone 80 milliGRAM(s) Chew two times a day    MEDICATIONS  (PRN):  acetaminophen     Tablet .. 650 milliGRAM(s) Oral every 6 hours PRN Temp greater or equal to 38C (100.4F)  benzocaine/menthol Lozenge 1 Lozenge Oral two times a day PRN Sore Throat  dextrose Oral Gel 15 Gram(s) Oral once PRN Blood Glucose LESS THAN 70 milliGRAM(s)/deciliter  guaiFENesin Oral Liquid (Sugar-Free) 100 milliGRAM(s) Oral every 6 hours PRN Cough

## 2023-10-13 NOTE — PROGRESS NOTE ADULT - ASSESSMENT
73F with hx of HTN, HLD, DM2, 3vCAD with mLAD 75%, pLCx 70%, and tortous pRCA 90%, and ESRD on dialysis MWF presenting as a transfer from [facility] for NSTEMI and possible staged PCI. Now s/p LHC with p-mRCA 90% ISR s/p Rota/JAMIL x1 and residual LAD 75%. Plan for Staged PCI for residual LAD lesion in 5 weeks. Course complicated by COVID infection (day 0 10/12/2023); now pending shelter and dialysis acceptance.  73F with hx of HTN, HLD, DM2, 3vCAD with mLAD 75%, pLCx 70%, and tortuous pRCA 90%, and ESRD on dialysis MWF presenting as a transfer from [facility] for NSTEMI and possible staged PCI. Now s/p LHC with p-mRCA 90% ISR s/p Rota/JAMIL x1 and residual LAD 75%. Plan for Staged PCI for residual LAD lesion in 5 weeks. Course complicated by COVID infection (day 0 10/12/2023); now pending shelter and dialysis acceptance.

## 2023-10-13 NOTE — PROGRESS NOTE ADULT - PROBLEM SELECTOR PLAN 1
- DAPT: ASA 81mg + Plavix 75mg   - Continue Atorvastatin 80mg QHS   [place] [facility] [place] [place] with mLAD 75%, pLCx 70%, and tortous pRCA 90%; s/p Heparin gtt for ACS coverage   - [facility] (10/10/23): Rota/JAMIL x1 to p-mRCA (90% ISR), LAD 75%. Plan for Staged PCI of residual LAD lesion in 5 weeks   - [facility] (10/01/23): LV EF 60-65%, LVH, normal wall motion. G1DD. Trace MR. Aortic valve mildly calcified - DAPT: ASA 81mg + Plavix 75mg   - Continue Atorvastatin 80mg QHS   - [facility] [place] [place] with mLAD 75%, pLCx 70%, and tortous pRCA 90%; s/p Heparin gtt for ACS coverage   - [facility] (10/10/23): Rota/JAMIL x1 to p-mRCA (90% ISR), LAD 75%. Plan for Staged PCI of residual LAD lesion in 5 weeks   - [facility] (10/01/23): LV EF 60-65%, LVH, normal wall motion. G1DD. Trace MR. Aortic valve mildly calcified  - Patient experienced 1 episode of angina 10/13 while at rest, self resolved; EKG with TWI not seen previously on EKG from 10/11. Given known residual disease, will consider performing staged intervention during this admission if symptoms continue to occur.

## 2023-10-13 NOTE — PROGRESS NOTE ADULT - SUBJECTIVE AND OBJECTIVE BOX
Patient seen on HD patient 1:45 min into tx with 500cc UF. patient began to complain of chest pain, give 100cc bolus, with stable VSS, continued to complain of chest pain and described it as pressure like, non radiating UF turned off HD terminated. EKG was done showed T-wave inversions in inferior leads which were new compared to EKG from 10/12. Cardiac PA, also at bedside, Follow up trops and ACS work up as per cardiology. At end of HD patient /70 HR 82, stable HDS.           PHYSICAL EXAM:  GENERAL: Alert, awake, NAD laying in bed    CHEST/LUNG: Bilateral clear breath sounds  HEART: Regular rate and rhythm, no murmur, no gallops, no rub   ABDOMEN: Soft, nontender, non distended  EXTREMITIES: no pedal edema  Neurology: AAOx3, no focal neurological deficit  ACCESS: LUE AVF w/bruit and thrill

## 2023-10-13 NOTE — PROGRESS NOTE ADULT - ASSESSMENT
This is a 73F with PMHX of CAD s/p PCI (2017) and recent T1 NSTEMI (s/p recent cardiac cath), ESRD on HD (via LUE AVF), HTN, T2DM, gout, fibroids who presents with a chief complaint of NSTEMI. Medicine consulted for COVID.     #COVID-19 - IMPROVING  PCR+ on 10/12, reports sore throat. Afebrile. WBC WNL. On RA 96%. No reported history of asthma/copd. Vaccinated with "multiple boosters" per patient (J&J).  Unable to receive paxlovid given CrCl <30. Does not qualify for dexamethasone given not on mechanical ventilation.   - Add Remdesivir x5 day course; discussed with cardiology PA and renal that remdesivir can be given in HD  - Supportive care:  - Standing tylenol 650mg q6hrs for fever  - C/w Robitussin-DM every 8 hours  - C/w Montelukast 10mg qd  - Cepacol lozenges for sore throat  - DVT PPX while inpatient    Med consult will continue to follow.  Thank you for this consult. Please page [phone] for any questions.  Recommendations not final until attestation signed by attending.

## 2023-10-13 NOTE — PROGRESS NOTE ADULT - PROBLEM SELECTOR PLAN 4
- c/o sore throat and low grade temp on 10/12. Found to be COVID+ 10/12/23  - Continue Montelukast 10mg QD and supportive management  - Medicine recs updated now - c/o sore throat and low grade temp on 10/12. Found to be COVID+ 10/12/23  - Continue Montelukast 10mg QD and supportive management  - Medicine recommending remdesivir taper, initiated 10/13/2023.

## 2023-10-13 NOTE — PROGRESS NOTE ADULT - PROBLEM SELECTOR PLAN 7
- Fluids: not indicated   - Replete Lytes to K>4, Mg>2 PRN   - Diet: DASH   - GI ppx: PPI   - PT Eval: pending   - Dispo/SW: Awaiting shelter placement - Fluids: not indicated   - Replete Lytes to K>4, Mg>2 PRN   - Diet: DASH   - GI ppx: PPI   - PT Eval: pending   - Dispo/SW: Awaiting shelter placement and dialysis acceptance.    Case discussed w/ Dr Alatorre

## 2023-10-13 NOTE — PROGRESS NOTE ADULT - SUBJECTIVE AND OBJECTIVE BOX
Patient seen on HD tolerating procedure well. Patient does not offer any complaints and is hemodynamically stable.  Continue HD as prescribed below, lowered UF goal to 1L   Hemodialysis Treatment.:     Schedule: Once, Modality: Hemodialysis, Access: Arteriovenous Fistula    Dialyzer: Optiflux C198MLd, Time: 210 Min    Blood Flow: 400 mL/Min , Dialysate Flow: 500 mL/Min, Dialysate Temp: 36.5, Tubinmm (Adult)    Target Fluid Removal: 1 Liter     Dialysate Electrolytes (mEq/L): Potassium 2, Calcium 2.5, Sodium 138, Bicarbonate 35 (10-13-23 @ 13:43) [Discontinued]    Hold UF if SBP <110      Vitals   T(F): 98.6  HR: 71  BP: 128/60  RR: 18  SpO2: 97%                  PHYSICAL EXAM:  GENERAL: Alert, awake, NAD laying in bed tolerating HD   HEENT: AMY, EOMI, neck supple, no JVP  CHEST/LUNG: Bilateral clear breath sounds  HEART: Regular rate and rhythm, no murmur, no gallops, no rub   ABDOMEN: Soft, nontender, non distended  : No flank or supra pubic tenderness.  EXTREMITIES: no pedal edema  Neurology: AAOx3, no focal neurological deficit  SKIN: No rash or skin lesion   ACCESS: LUE AVF w/bruit and thrill accessed

## 2023-10-13 NOTE — PROGRESS NOTE ADULT - SUBJECTIVE AND OBJECTIVE BOX
Patient is a 73y Female seen and evaluated at bedside. No acute distress, Covid +, plan for iHD as per schedule today.       Meds:    acetaminophen     Tablet .. 650 every 6 hours PRN  amLODIPine   Tablet 10 daily  aspirin enteric coated 81 daily  atorvastatin 80 at bedtime  benzocaine/menthol Lozenge 1 two times a day PRN  cinacalcet 60 daily  clopidogrel Tablet 75 daily  dextrose 5%. 1000 <Continuous>  dextrose 5%. 1000 <Continuous>  dextrose 50% Injectable 25 once  dextrose 50% Injectable 25 once  dextrose 50% Injectable 12.5 once  dextrose Oral Gel 15 once PRN  gabapentin 100 <User Schedule>  glucagon  Injectable 1 once  guaiFENesin Oral Liquid (Sugar-Free) 100 every 6 hours PRN  insulin lispro (ADMELOG) corrective regimen sliding scale  at bedtime  insulin lispro (ADMELOG) corrective regimen sliding scale  three times a day before meals  metoprolol tartrate 25 two times a day  montelukast 10 daily  pantoprazole    Tablet 40 before breakfast  polyethylene glycol 3350 17 daily  remdesivir  IVPB    remdesivir  IVPB 200 every 24 hours  senna 1 daily  simethicone 80 two times a day      T(C): , Max: 38.9 (10-12-23 @ 15:44)  T(F): , Max: 102 (10-12-23 @ 15:44)  HR: 71 (10-13-23 @ 12:05)  BP: 128/60 (10-13-23 @ 12:05)  BP(mean): 86 (10-13-23 @ 09:05)  RR: 18 (10-13-23 @ 12:05)  SpO2: 97% (10-13-23 @ 12:05)  Wt(kg): --    10-12 @ 07:01  -  10-13 @ 07:00  --------------------------------------------------------  IN: 0 mL / OUT: 0 mL / NET: 0 mL    10-13 @ 07:01  -  10-13 @ 12:38  --------------------------------------------------------  IN: 225 mL / OUT: 0 mL / NET: 225 mL          Review of Systems:  ROS negative except as per HPI      PHYSICAL EXAM:  GENERAL: Alert, awake, NAD    CHEST/LUNG: Bilateral clear breath sounds  HEART: Regular rate and rhythm  ABDOMEN: Soft, nontender, non distended  EXTREMITIES: no pedal edema  Neurology: AAOx3, no focal neurological deficit  ACCESS: LUE AVF w/bruit and thrill       LABS:                        10.4   9.03  )-----------( 187      ( 13 Oct 2023 05:30 )             34.4     10-13    135  |  92<L>  |  55<H>  ----------------------------<  119<H>  5.0   |  24  |  10.84<H>    Ca    8.6      13 Oct 2023 05:30  Phos  6.4     10-13  Mg     2.2     10-13    TPro  6.4  /  Alb  3.2<L>  /  TBili  0.2  /  DBili  <0.2  /  AST  33  /  ALT  11  /  AlkPhos  107  10-13        Urinalysis Basic - ( 13 Oct 2023 05:30 )    Color: x / Appearance: x / SG: x / pH: x  Gluc: 119 mg/dL / Ketone: x  / Bili: x / Urobili: x   Blood: x / Protein: x / Nitrite: x   Leuk Esterase: x / RBC: x / WBC x   Sq Epi: x / Non Sq Epi: x / Bacteria: x            RADIOLOGY & ADDITIONAL STUDIES:

## 2023-10-14 LAB
ALBUMIN SERPL ELPH-MCNC: 3.4 G/DL — SIGNIFICANT CHANGE UP (ref 3.3–5)
ALP SERPL-CCNC: 106 U/L — SIGNIFICANT CHANGE UP (ref 40–120)
ALT FLD-CCNC: 11 U/L — SIGNIFICANT CHANGE UP (ref 10–45)
ANION GAP SERPL CALC-SCNC: 19 MMOL/L — HIGH (ref 5–17)
ANISOCYTOSIS BLD QL: SLIGHT — SIGNIFICANT CHANGE UP
AST SERPL-CCNC: 29 U/L — SIGNIFICANT CHANGE UP (ref 10–40)
BASOPHILS # BLD AUTO: 0.11 K/UL — SIGNIFICANT CHANGE UP (ref 0–0.2)
BASOPHILS NFR BLD AUTO: 1.8 % — SIGNIFICANT CHANGE UP (ref 0–2)
BILIRUB SERPL-MCNC: 0.2 MG/DL — SIGNIFICANT CHANGE UP (ref 0.2–1.2)
BUN SERPL-MCNC: 48 MG/DL — HIGH (ref 7–23)
BURR CELLS BLD QL SMEAR: PRESENT — SIGNIFICANT CHANGE UP
CALCIUM SERPL-MCNC: 8.5 MG/DL — SIGNIFICANT CHANGE UP (ref 8.4–10.5)
CHLORIDE SERPL-SCNC: 93 MMOL/L — LOW (ref 96–108)
CO2 SERPL-SCNC: 25 MMOL/L — SIGNIFICANT CHANGE UP (ref 22–31)
CREAT SERPL-MCNC: 9.46 MG/DL — HIGH (ref 0.5–1.3)
EGFR: 4 ML/MIN/1.73M2 — LOW
EOSINOPHIL # BLD AUTO: 0 K/UL — SIGNIFICANT CHANGE UP (ref 0–0.5)
EOSINOPHIL NFR BLD AUTO: 0 % — SIGNIFICANT CHANGE UP (ref 0–6)
GIANT PLATELETS BLD QL SMEAR: PRESENT — SIGNIFICANT CHANGE UP
GLUCOSE BLDC GLUCOMTR-MCNC: 111 MG/DL — HIGH (ref 70–99)
GLUCOSE BLDC GLUCOMTR-MCNC: 139 MG/DL — HIGH (ref 70–99)
GLUCOSE BLDC GLUCOMTR-MCNC: 186 MG/DL — HIGH (ref 70–99)
GLUCOSE BLDC GLUCOMTR-MCNC: 258 MG/DL — HIGH (ref 70–99)
GLUCOSE SERPL-MCNC: 101 MG/DL — HIGH (ref 70–99)
HCT VFR BLD CALC: 38.6 % — SIGNIFICANT CHANGE UP (ref 34.5–45)
HGB BLD-MCNC: 11.4 G/DL — LOW (ref 11.5–15.5)
HYPOCHROMIA BLD QL: SLIGHT — SIGNIFICANT CHANGE UP
LYMPHOCYTES # BLD AUTO: 0.84 K/UL — LOW (ref 1–3.3)
LYMPHOCYTES # BLD AUTO: 13.3 % — SIGNIFICANT CHANGE UP (ref 13–44)
MAGNESIUM SERPL-MCNC: 2.2 MG/DL — SIGNIFICANT CHANGE UP (ref 1.6–2.6)
MANUAL SMEAR VERIFICATION: SIGNIFICANT CHANGE UP
MCHC RBC-ENTMCNC: 29.4 PG — SIGNIFICANT CHANGE UP (ref 27–34)
MCHC RBC-ENTMCNC: 29.5 GM/DL — LOW (ref 32–36)
MCV RBC AUTO: 99.5 FL — SIGNIFICANT CHANGE UP (ref 80–100)
METAMYELOCYTES # FLD: 0.9 % — HIGH (ref 0–0)
MONOCYTES # BLD AUTO: 0.61 K/UL — SIGNIFICANT CHANGE UP (ref 0–0.9)
MONOCYTES NFR BLD AUTO: 9.7 % — SIGNIFICANT CHANGE UP (ref 2–14)
NEUTROPHILS # BLD AUTO: 4.68 K/UL — SIGNIFICANT CHANGE UP (ref 1.8–7.4)
NEUTROPHILS NFR BLD AUTO: 74.3 % — SIGNIFICANT CHANGE UP (ref 43–77)
OVALOCYTES BLD QL SMEAR: SLIGHT — SIGNIFICANT CHANGE UP
PLAT MORPH BLD: ABNORMAL
PLATELET # BLD AUTO: 189 K/UL — SIGNIFICANT CHANGE UP (ref 150–400)
POIKILOCYTOSIS BLD QL AUTO: SIGNIFICANT CHANGE UP
POLYCHROMASIA BLD QL SMEAR: SLIGHT — SIGNIFICANT CHANGE UP
POTASSIUM SERPL-MCNC: 4.6 MMOL/L — SIGNIFICANT CHANGE UP (ref 3.5–5.3)
POTASSIUM SERPL-SCNC: 4.6 MMOL/L — SIGNIFICANT CHANGE UP (ref 3.5–5.3)
PROT SERPL-MCNC: 6.7 G/DL — SIGNIFICANT CHANGE UP (ref 6–8.3)
RBC # BLD: 3.88 M/UL — SIGNIFICANT CHANGE UP (ref 3.8–5.2)
RBC # FLD: 16.5 % — HIGH (ref 10.3–14.5)
RBC BLD AUTO: ABNORMAL
SCHISTOCYTES BLD QL AUTO: SLIGHT — SIGNIFICANT CHANGE UP
SODIUM SERPL-SCNC: 137 MMOL/L — SIGNIFICANT CHANGE UP (ref 135–145)
WBC # BLD: 6.3 K/UL — SIGNIFICANT CHANGE UP (ref 3.8–10.5)
WBC # FLD AUTO: 6.3 K/UL — SIGNIFICANT CHANGE UP (ref 3.8–10.5)

## 2023-10-14 PROCEDURE — 99232 SBSQ HOSP IP/OBS MODERATE 35: CPT

## 2023-10-14 PROCEDURE — 99233 SBSQ HOSP IP/OBS HIGH 50: CPT

## 2023-10-14 RX ORDER — CARVEDILOL PHOSPHATE 80 MG/1
6.25 CAPSULE, EXTENDED RELEASE ORAL EVERY 12 HOURS
Refills: 0 | Status: DISCONTINUED | OUTPATIENT
Start: 2023-10-14 | End: 2023-10-15

## 2023-10-14 RX ADMIN — Medication 1: at 12:38

## 2023-10-14 RX ADMIN — POLYETHYLENE GLYCOL 3350 17 GRAM(S): 17 POWDER, FOR SOLUTION ORAL at 17:42

## 2023-10-14 RX ADMIN — Medication 3: at 17:41

## 2023-10-14 RX ADMIN — MONTELUKAST 10 MILLIGRAM(S): 4 TABLET, CHEWABLE ORAL at 11:20

## 2023-10-14 RX ADMIN — Medication 81 MILLIGRAM(S): at 11:20

## 2023-10-14 RX ADMIN — CARVEDILOL PHOSPHATE 6.25 MILLIGRAM(S): 80 CAPSULE, EXTENDED RELEASE ORAL at 12:19

## 2023-10-14 RX ADMIN — Medication 650 MILLIGRAM(S): at 06:05

## 2023-10-14 RX ADMIN — CLOPIDOGREL BISULFATE 75 MILLIGRAM(S): 75 TABLET, FILM COATED ORAL at 11:21

## 2023-10-14 RX ADMIN — CINACALCET 60 MILLIGRAM(S): 30 TABLET, FILM COATED ORAL at 12:37

## 2023-10-14 RX ADMIN — SIMETHICONE 80 MILLIGRAM(S): 80 TABLET, CHEWABLE ORAL at 06:00

## 2023-10-14 RX ADMIN — Medication 650 MILLIGRAM(S): at 12:21

## 2023-10-14 RX ADMIN — AMLODIPINE BESYLATE 10 MILLIGRAM(S): 2.5 TABLET ORAL at 06:00

## 2023-10-14 RX ADMIN — SENNA PLUS 1 TABLET(S): 8.6 TABLET ORAL at 11:21

## 2023-10-14 RX ADMIN — Medication 25 MILLIGRAM(S): at 06:00

## 2023-10-14 RX ADMIN — CARVEDILOL PHOSPHATE 6.25 MILLIGRAM(S): 80 CAPSULE, EXTENDED RELEASE ORAL at 17:40

## 2023-10-14 RX ADMIN — Medication 650 MILLIGRAM(S): at 11:21

## 2023-10-14 RX ADMIN — Medication 650 MILLIGRAM(S): at 08:05

## 2023-10-14 RX ADMIN — REMDESIVIR 200 MILLIGRAM(S): 5 INJECTION INTRAVENOUS at 17:40

## 2023-10-14 RX ADMIN — PANTOPRAZOLE SODIUM 40 MILLIGRAM(S): 20 TABLET, DELAYED RELEASE ORAL at 06:00

## 2023-10-14 RX ADMIN — ATORVASTATIN CALCIUM 80 MILLIGRAM(S): 80 TABLET, FILM COATED ORAL at 22:04

## 2023-10-14 RX ADMIN — SIMETHICONE 80 MILLIGRAM(S): 80 TABLET, CHEWABLE ORAL at 17:40

## 2023-10-14 NOTE — PROGRESS NOTE ADULT - PROBLEM SELECTOR PLAN 7
- Fluids: not indicated   - Replete Lytes to K>4, Mg>2 PRN   - Diet: DASH   - GI ppx: PPI   - PT Eval: pending   - Dispo/SW: Awaiting shelter placement and dialysis acceptance.    Case discussed w/ Dr Alatorre - Fluids: not indicated   - Replete Lytes to K>4, Mg>2 PRN   - Diet: DASH   - GI ppx: PPI   - PT Eval: pending   - Dispo/SW: Awaiting shelter placement and dialysis acceptance.    Case discussed w/ Dr Painter

## 2023-10-14 NOTE — PROGRESS NOTE ADULT - PROBLEM SELECTOR PLAN 4
- c/o sore throat and low grade temp on 10/12. Found to be COVID+ 10/12/23  - Continue Montelukast 10mg QD and supportive management  - Medicine recommending remdesivir taper, initiated 10/13/2023. - c/o sore throat and low grade temp on 10/12. Found to be COVID+ 10/12/23  - Continue Montelukast 10mg QD and supportive management  - c/w remdesivir taper, initiated 10/13/2023.

## 2023-10-14 NOTE — PROGRESS NOTE ADULT - ASSESSMENT
73F with hx of HTN, HLD, DM2, 3vCAD with mLAD 75%, pLCx 70%, and tortuous pRCA 90%, and ESRD on dialysis MWF presenting as a transfer from [facility] for NSTEMI and possible staged PCI. Now s/p LHC with p-mRCA 90% ISR s/p Rota/JAMIL x1 and residual LAD 75%. Plan for Staged PCI for residual LAD lesion in 5 weeks. Course complicated by COVID infection (day 0 10/12/2023); now pending shelter and dialysis acceptance.

## 2023-10-14 NOTE — PROGRESS NOTE ADULT - SUBJECTIVE AND OBJECTIVE BOX
Cardiology PA Adult Progress Note    SUBJECTIVE ASSESSMENT: Overnight no acute events; Patient laying comfortably in bed. Reports continued cough, but otherwise feels better than yesterday, despite still spiking fever. No recurrence of CP. Discussed eventual plan for staged PCI which was apparently not relayed to her before. Understands barrier to discharge being shelter and HD placements. Currently denies CP, dizziness, palpitations, SOB, dyspnea, coughing, headaches, N/V/D/abdominal pain. Patient understands plan for the day.   	  MEDICATIONS:  amLODIPine   Tablet 10 milliGRAM(s) Oral daily  metoprolol tartrate 25 milliGRAM(s) Oral two times a day  nitroglycerin     SubLingual 0.4 milliGRAM(s) SubLingual once  remdesivir  IVPB 100 milliGRAM(s) IV Intermittent every 24 hours  remdesivir  IVPB   IV Intermittent   guaiFENesin Oral Liquid (Sugar-Free) 100 milliGRAM(s) Oral every 6 hours PRN  montelukast 10 milliGRAM(s) Oral daily  acetaminophen     Tablet .. 650 milliGRAM(s) Oral every 6 hours PRN  acetaminophen   IVPB .. 1000 milliGRAM(s) IV Intermittent once  gabapentin 100 milliGRAM(s) Oral <User Schedule>  pantoprazole    Tablet 40 milliGRAM(s) Oral before breakfast  polyethylene glycol 3350 17 Gram(s) Oral daily  senna 1 Tablet(s) Oral daily  simethicone 80 milliGRAM(s) Chew two times a day  atorvastatin 80 milliGRAM(s) Oral at bedtime  cinacalcet 60 milliGRAM(s) Oral daily  dextrose 50% Injectable 25 Gram(s) IV Push once  dextrose 50% Injectable 25 Gram(s) IV Push once  dextrose 50% Injectable 12.5 Gram(s) IV Push once  dextrose Oral Gel 15 Gram(s) Oral once PRN  glucagon  Injectable 1 milliGRAM(s) IntraMuscular once  insulin lispro (ADMELOG) corrective regimen sliding scale   SubCutaneous three times a day before meals  insulin lispro (ADMELOG) corrective regimen sliding scale   SubCutaneous at bedtime  aspirin enteric coated 81 milliGRAM(s) Oral daily  benzocaine/menthol Lozenge 1 Lozenge Oral two times a day PRN  clopidogrel Tablet 75 milliGRAM(s) Oral daily  dextrose 5%. 1000 milliLiter(s) IV Continuous <Continuous>  dextrose 5%. 1000 milliLiter(s) IV Continuous <Continuous>    	  VITAL SIGNS:  T(C): 36.3 (10-14-23 @ 09:10), Max: 38.2 (10-13-23 @ 20:51)  HR: 74 (10-14-23 @ 09:10) (69 - 92)  BP: 118/56 (10-14-23 @ 09:10) (118/56 - 168/72)  RR: 18 (10-14-23 @ 09:10) (17 - 19)  SpO2: 95% (10-14-23 @ 09:10) (93% - 99%)  Wt(kg): --    I&O's Summary    13 Oct 2023 07:01  -  14 Oct 2023 07:00  --------------------------------------------------------  IN: 225 mL / OUT: 500 mL / NET: -275 mL    14 Oct 2023 07:01  -  14 Oct 2023 09:50  --------------------------------------------------------  IN: 180 mL / OUT: 0 mL / NET: 180 mL                                           PHYSICAL EXAM:  Appearance: Normal	  HEENT: Normal oral mucosa, PERRL, EOMI	  Neck: Supple, + JVD/ - JVD; ___ Carotid Bruit   Cardiovascular: Normal S1 S2, No murmurs  Respiratory: Lungs clear to auscultation/Decreased Breath Sounds/No Rales, Rhonchi, Wheezing	  Gastrointestinal:  Soft, Non-tender, + BS	  Skin: No rashes, No ecchymoses, No cyanosis  Extremities: Normal range of motion, No clubbing, cyanosis or edema  Vascular: Peripheral pulses palpable 2+ bilaterally  Neurologic: Non-focal  Psychiatry: A & O x 3, Mood & affect appropriate    LABS:	 	                                  11.4   6.30  )-----------( 189      ( 14 Oct 2023 05:30 )             38.6     10-14    137  |  93<L>  |  48<H>  ----------------------------<  101<H>  4.6   |  25  |  9.46<H>    Ca    8.5      14 Oct 2023 05:30  Phos  6.4     10-13  Mg     2.2     10-14    TPro  6.7  /  Alb  3.4  /  TBili  0.2  /  DBili  x   /  AST  29  /  ALT  11  /  AlkPhos  106  10-14    proBNP:   Lipid Profile:   HgA1c:   TSH:    Cardiology PA Adult Progress Note    SUBJECTIVE ASSESSMENT: Overnight no acute events; Patient laying comfortably in bed. Reports continued cough, but otherwise feels better than yesterday, despite still spiking fever. No recurrence of CP. Discussed eventual plan for staged PCI which was apparently not relayed to her before. Understands barrier to discharge being shelter and HD placements. Currently denies CP, dizziness, palpitations, SOB, dyspnea, coughing, headaches, N/V/D/abdominal pain. Patient understands plan for the day.   	  MEDICATIONS:  amLODIPine   Tablet 10 milliGRAM(s) Oral daily  metoprolol tartrate 25 milliGRAM(s) Oral two times a day  nitroglycerin     SubLingual 0.4 milliGRAM(s) SubLingual once  remdesivir  IVPB 100 milliGRAM(s) IV Intermittent every 24 hours  remdesivir  IVPB   IV Intermittent   guaiFENesin Oral Liquid (Sugar-Free) 100 milliGRAM(s) Oral every 6 hours PRN  montelukast 10 milliGRAM(s) Oral daily  acetaminophen     Tablet .. 650 milliGRAM(s) Oral every 6 hours PRN  acetaminophen   IVPB .. 1000 milliGRAM(s) IV Intermittent once  gabapentin 100 milliGRAM(s) Oral <User Schedule>  pantoprazole    Tablet 40 milliGRAM(s) Oral before breakfast  polyethylene glycol 3350 17 Gram(s) Oral daily  senna 1 Tablet(s) Oral daily  simethicone 80 milliGRAM(s) Chew two times a day  atorvastatin 80 milliGRAM(s) Oral at bedtime  cinacalcet 60 milliGRAM(s) Oral daily  dextrose 50% Injectable 25 Gram(s) IV Push once  dextrose 50% Injectable 25 Gram(s) IV Push once  dextrose 50% Injectable 12.5 Gram(s) IV Push once  dextrose Oral Gel 15 Gram(s) Oral once PRN  glucagon  Injectable 1 milliGRAM(s) IntraMuscular once  insulin lispro (ADMELOG) corrective regimen sliding scale   SubCutaneous three times a day before meals  insulin lispro (ADMELOG) corrective regimen sliding scale   SubCutaneous at bedtime  aspirin enteric coated 81 milliGRAM(s) Oral daily  benzocaine/menthol Lozenge 1 Lozenge Oral two times a day PRN  clopidogrel Tablet 75 milliGRAM(s) Oral daily  dextrose 5%. 1000 milliLiter(s) IV Continuous <Continuous>  dextrose 5%. 1000 milliLiter(s) IV Continuous <Continuous>    	  VITAL SIGNS:  T(C): 36.3 (10-14-23 @ 09:10), Max: 38.2 (10-13-23 @ 20:51)  HR: 74 (10-14-23 @ 09:10) (69 - 92)  BP: 118/56 (10-14-23 @ 09:10) (118/56 - 168/72)  RR: 18 (10-14-23 @ 09:10) (17 - 19)  SpO2: 95% (10-14-23 @ 09:10) (93% - 99%)  Wt(kg): --    I&O's Summary    13 Oct 2023 07:01  -  14 Oct 2023 07:00  --------------------------------------------------------  IN: 225 mL / OUT: 500 mL / NET: -275 mL    14 Oct 2023 07:01  -  14 Oct 2023 09:50  --------------------------------------------------------  IN: 180 mL / OUT: 0 mL / NET: 180 mL                                           PHYSICAL EXAM:  Appearance: Normal	  HEENT: Normal oral mucosa, PERRL, EOMI	  Neck: Supple, - JVD, no carotid Bruit   Cardiovascular: Normal S1 S2, No murmurs  Respiratory: Lungs clear to auscultation, No Rales, Rhonchi, Wheezing	  Gastrointestinal:  Soft, Non-tender, + BS	  Skin: No rashes, No ecchymoses, No cyanosis  Extremities: Normal range of motion, No clubbing, cyanosis or edema  Vascular: Peripheral pulses palpable 2+ bilaterally  Neurologic: Non-focal  Psychiatry: A & O x 3, Mood & affect appropriate    LABS:	 	                       11.4   6.30  )-----------( 189      ( 14 Oct 2023 05:30 )             38.6     10-14    137  |  93<L>  |  48<H>  ----------------------------<  101<H>  4.6   |  25  |  9.46<H>    Ca    8.5      14 Oct 2023 05:30  Phos  6.4     10-13  Mg     2.2     10-14    TPro  6.7  /  Alb  3.4  /  TBili  0.2  /  DBili  x   /  AST  29  /  ALT  11  /  AlkPhos  106  10-14

## 2023-10-14 NOTE — PROGRESS NOTE ADULT - PROBLEM SELECTOR PLAN 1
- DAPT: ASA 81mg + Plavix 75mg   - Continue Atorvastatin 80mg QHS   [place] [facility] [place] [place] with mLAD 75%, pLCx 70%, and tortous pRCA 90%; s/p Heparin gtt for ACS coverage   - [facility] (10/10/23): Rota/JAMIL x1 to p-mRCA (90% ISR), LAD 75%. Plan for Staged PCI of residual LAD lesion in 5 weeks   - [facility] (10/01/23): LV EF 60-65%, LVH, normal wall motion. G1DD. Trace MR. Aortic valve mildly calcified  - Patient experienced 1 episode of angina 10/13 while at rest, self resolved; EKG with TWI not seen previously on EKG from 10/11. Given known residual disease, will consider performing staged intervention during this admission if symptoms continue to occur.

## 2023-10-14 NOTE — PROGRESS NOTE ADULT - SUBJECTIVE AND OBJECTIVE BOX
INTERVAL HPI/OVERNIGHT EVENTS:  As per night team, no overnight events. Patient seen and examined at bedside. Patient denies fever, chills, dizziness, weakness, HA, CP, SOB, N/V/D/C      VITALS  Vital Signs Last 24 Hrs  T(C): 36.3 (14 Oct 2023 09:10), Max: 38.2 (13 Oct 2023 20:51)  T(F): 97.4 (14 Oct 2023 09:10), Max: 100.8 (13 Oct 2023 20:51)  HR: 74 (14 Oct 2023 09:10) (69 - 92)  BP: 118/56 (14 Oct 2023 09:10) (118/56 - 168/72)  BP(mean): 104 (14 Oct 2023 05:08) (84 - 104)  RR: 18 (14 Oct 2023 09:10) (17 - 19)  SpO2: 95% (14 Oct 2023 09:10) (93% - 99%)    Parameters below as of 14 Oct 2023 09:10  Patient On (Oxygen Delivery Method): room air        CAPILLARY BLOOD GLUCOSE      POCT Blood Glucose.: 186 mg/dL (14 Oct 2023 11:46)  POCT Blood Glucose.: 111 mg/dL (14 Oct 2023 06:58)  POCT Blood Glucose.: 125 mg/dL (13 Oct 2023 21:59)  POCT Blood Glucose.: 119 mg/dL (13 Oct 2023 21:51)  POCT Blood Glucose.: 166 mg/dL (13 Oct 2023 16:51)  POCT Blood Glucose.: 163 mg/dL (13 Oct 2023 16:49)  POCT Blood Glucose.: 158 mg/dL (13 Oct 2023 13:54)      PHYSICAL EXAM  General: NAD, sitting comfortably in bed   HEENT: PERRL/ EOMI, no scleral icterus, MMM  Neck: Supple, no JVD  Respiratory: lungs CTA b/l, no wheezes/crackles, no accessory muscle use  Cardiovascular: Regular rhythm/rate; +S1 +S2, no murmurs  Gastrointestinal: Soft, NTND, normoactive BS, no rebound, no guarding  Genitourinary: no suprapubic tenderness  Extremities: WWP, no cyanosis, no clubbing, no edema, pulses equal  Neurological: A&Ox3, no gross focal deficits, follows commands  Skin: Normal temperature, warm, dry    MEDICATIONS  (STANDING):  acetaminophen   IVPB .. 1000 milliGRAM(s) IV Intermittent once  amLODIPine   Tablet 10 milliGRAM(s) Oral daily  aspirin enteric coated 81 milliGRAM(s) Oral daily  atorvastatin 80 milliGRAM(s) Oral at bedtime  carvedilol 6.25 milliGRAM(s) Oral every 12 hours  cinacalcet 60 milliGRAM(s) Oral daily  clopidogrel Tablet 75 milliGRAM(s) Oral daily  dextrose 5%. 1000 milliLiter(s) (50 mL/Hr) IV Continuous <Continuous>  dextrose 5%. 1000 milliLiter(s) (100 mL/Hr) IV Continuous <Continuous>  dextrose 50% Injectable 25 Gram(s) IV Push once  dextrose 50% Injectable 25 Gram(s) IV Push once  dextrose 50% Injectable 12.5 Gram(s) IV Push once  gabapentin 100 milliGRAM(s) Oral <User Schedule>  glucagon  Injectable 1 milliGRAM(s) IntraMuscular once  insulin lispro (ADMELOG) corrective regimen sliding scale   SubCutaneous three times a day before meals  insulin lispro (ADMELOG) corrective regimen sliding scale   SubCutaneous at bedtime  montelukast 10 milliGRAM(s) Oral daily  nitroglycerin     SubLingual 0.4 milliGRAM(s) SubLingual once  pantoprazole    Tablet 40 milliGRAM(s) Oral before breakfast  polyethylene glycol 3350 17 Gram(s) Oral daily  remdesivir  IVPB   IV Intermittent   remdesivir  IVPB 100 milliGRAM(s) IV Intermittent every 24 hours  senna 1 Tablet(s) Oral daily  simethicone 80 milliGRAM(s) Chew two times a day    MEDICATIONS  (PRN):  acetaminophen     Tablet .. 650 milliGRAM(s) Oral every 6 hours PRN Temp greater or equal to 38C (100.4F)  benzocaine/menthol Lozenge 1 Lozenge Oral two times a day PRN Sore Throat  dextrose Oral Gel 15 Gram(s) Oral once PRN Blood Glucose LESS THAN 70 milliGRAM(s)/deciliter  guaiFENesin Oral Liquid (Sugar-Free) 100 milliGRAM(s) Oral every 6 hours PRN Cough      No Known Allergies      LABS                        11.4   6.30  )-----------( 189      ( 14 Oct 2023 05:30 )             38.6     10-14    137  |  93<L>  |  48<H>  ----------------------------<  101<H>  4.6   |  25  |  9.46<H>    Ca    8.5      14 Oct 2023 05:30  Phos  6.4     10-13  Mg     2.2     10-14    TPro  6.7  /  Alb  3.4  /  TBili  0.2  /  DBili  x   /  AST  29  /  ALT  11  /  AlkPhos  106  10-14      Urinalysis Basic - ( 14 Oct 2023 05:30 )    Color: x / Appearance: x / SG: x / pH: x  Gluc: 101 mg/dL / Ketone: x  / Bili: x / Urobili: x   Blood: x / Protein: x / Nitrite: x   Leuk Esterase: x / RBC: x / WBC x   Sq Epi: x / Non Sq Epi: x / Bacteria: x      CARDIAC MARKERS ( 13 Oct 2023 14:17 )  x     / x     / 54 U/L / x     / 2.1 ng/mL        RADIOLOGY & ADDITIONAL TESTS: Reviewed

## 2023-10-14 NOTE — PROGRESS NOTE ADULT - ASSESSMENT
This is a 73F with PMHX of CAD s/p PCI (2017) and recent T1 NSTEMI (s/p recent cardiac cath), ESRD on HD (via LUE AVF), HTN, T2DM, gout, fibroids who presents with a chief complaint of NSTEMI. Medicine consulted for COVID.     #COVID-19 - IMPROVING  PCR+ on 10/12, reports sore throat. Afebrile. WBC WNL. On RA 96%. No reported history of asthma/copd. Vaccinated with "multiple boosters" per patient (J&J).  Unable to receive paxlovid given CrCl <30. Does not qualify for dexamethasone given not on mechanical ventilation.   - c/w Remdesivir x5 day course; discussed with cardiology PA and renal that remdesivir can be given in HD  - Supportive care:  - Standing tylenol 650mg q6hrs for fever  - C/w Robitussin-DM every 8 hours  - C/w Montelukast 10mg qd  - Cepacol lozenges for sore throat  - DVT PPX while inpatient    Med consult will continue to follow.  Thank you for this consult. Please page [phone] for any questions.  Recommendations not final until attestation signed by attending.

## 2023-10-14 NOTE — PROGRESS NOTE ADULT - PROBLEM SELECTOR PLAN 3
- Continue Amlodipine 10mg QD and lopressor 25 mg BID - remains hypertensive as high as 180, so continue with amlodipine 10 mg QD and switched metoprolol to coreg 6.25 mg BID, will uptitrate as tolerated.

## 2023-10-15 LAB
ANION GAP SERPL CALC-SCNC: 22 MMOL/L — HIGH (ref 5–17)
BUN SERPL-MCNC: 79 MG/DL — HIGH (ref 7–23)
CALCIUM SERPL-MCNC: 8.8 MG/DL — SIGNIFICANT CHANGE UP (ref 8.4–10.5)
CHLORIDE SERPL-SCNC: 94 MMOL/L — LOW (ref 96–108)
CO2 SERPL-SCNC: 24 MMOL/L — SIGNIFICANT CHANGE UP (ref 22–31)
CREAT SERPL-MCNC: 12.31 MG/DL — HIGH (ref 0.5–1.3)
EGFR: 3 ML/MIN/1.73M2 — LOW
GLUCOSE BLDC GLUCOMTR-MCNC: 101 MG/DL — HIGH (ref 70–99)
GLUCOSE BLDC GLUCOMTR-MCNC: 128 MG/DL — HIGH (ref 70–99)
GLUCOSE BLDC GLUCOMTR-MCNC: 199 MG/DL — HIGH (ref 70–99)
GLUCOSE BLDC GLUCOMTR-MCNC: 271 MG/DL — HIGH (ref 70–99)
GLUCOSE SERPL-MCNC: 111 MG/DL — HIGH (ref 70–99)
HCT VFR BLD CALC: 34.1 % — LOW (ref 34.5–45)
HGB BLD-MCNC: 10.3 G/DL — LOW (ref 11.5–15.5)
MAGNESIUM SERPL-MCNC: 2.5 MG/DL — SIGNIFICANT CHANGE UP (ref 1.6–2.6)
MCHC RBC-ENTMCNC: 29.8 PG — SIGNIFICANT CHANGE UP (ref 27–34)
MCHC RBC-ENTMCNC: 30.2 GM/DL — LOW (ref 32–36)
MCV RBC AUTO: 98.6 FL — SIGNIFICANT CHANGE UP (ref 80–100)
METHYLMALONATE SERPL-SCNC: 561 NMOL/L — HIGH (ref 0–378)
NRBC # BLD: 0 /100 WBCS — SIGNIFICANT CHANGE UP (ref 0–0)
PLATELET # BLD AUTO: 204 K/UL — SIGNIFICANT CHANGE UP (ref 150–400)
POTASSIUM SERPL-MCNC: 5.3 MMOL/L — SIGNIFICANT CHANGE UP (ref 3.5–5.3)
POTASSIUM SERPL-SCNC: 5.3 MMOL/L — SIGNIFICANT CHANGE UP (ref 3.5–5.3)
RBC # BLD: 3.46 M/UL — LOW (ref 3.8–5.2)
RBC # FLD: 16.4 % — HIGH (ref 10.3–14.5)
SODIUM SERPL-SCNC: 140 MMOL/L — SIGNIFICANT CHANGE UP (ref 135–145)
WBC # BLD: 5.42 K/UL — SIGNIFICANT CHANGE UP (ref 3.8–10.5)
WBC # FLD AUTO: 5.42 K/UL — SIGNIFICANT CHANGE UP (ref 3.8–10.5)

## 2023-10-15 PROCEDURE — 99233 SBSQ HOSP IP/OBS HIGH 50: CPT

## 2023-10-15 PROCEDURE — 99232 SBSQ HOSP IP/OBS MODERATE 35: CPT

## 2023-10-15 RX ORDER — INSULIN GLARGINE 100 [IU]/ML
5 INJECTION, SOLUTION SUBCUTANEOUS AT BEDTIME
Refills: 0 | Status: DISCONTINUED | OUTPATIENT
Start: 2023-10-15 | End: 2023-10-22

## 2023-10-15 RX ORDER — CARVEDILOL PHOSPHATE 80 MG/1
12.5 CAPSULE, EXTENDED RELEASE ORAL EVERY 12 HOURS
Refills: 0 | Status: DISCONTINUED | OUTPATIENT
Start: 2023-10-15 | End: 2023-10-20

## 2023-10-15 RX ORDER — CARVEDILOL PHOSPHATE 80 MG/1
6.25 CAPSULE, EXTENDED RELEASE ORAL ONCE
Refills: 0 | Status: COMPLETED | OUTPATIENT
Start: 2023-10-15 | End: 2023-10-15

## 2023-10-15 RX ADMIN — CINACALCET 60 MILLIGRAM(S): 30 TABLET, FILM COATED ORAL at 12:54

## 2023-10-15 RX ADMIN — INSULIN GLARGINE 5 UNIT(S): 100 INJECTION, SOLUTION SUBCUTANEOUS at 21:57

## 2023-10-15 RX ADMIN — ATORVASTATIN CALCIUM 80 MILLIGRAM(S): 80 TABLET, FILM COATED ORAL at 22:34

## 2023-10-15 RX ADMIN — CLOPIDOGREL BISULFATE 75 MILLIGRAM(S): 75 TABLET, FILM COATED ORAL at 12:55

## 2023-10-15 RX ADMIN — MONTELUKAST 10 MILLIGRAM(S): 4 TABLET, CHEWABLE ORAL at 12:54

## 2023-10-15 RX ADMIN — CARVEDILOL PHOSPHATE 6.25 MILLIGRAM(S): 80 CAPSULE, EXTENDED RELEASE ORAL at 12:54

## 2023-10-15 RX ADMIN — CARVEDILOL PHOSPHATE 12.5 MILLIGRAM(S): 80 CAPSULE, EXTENDED RELEASE ORAL at 22:34

## 2023-10-15 RX ADMIN — SIMETHICONE 80 MILLIGRAM(S): 80 TABLET, CHEWABLE ORAL at 18:25

## 2023-10-15 RX ADMIN — Medication 81 MILLIGRAM(S): at 12:54

## 2023-10-15 RX ADMIN — SIMETHICONE 80 MILLIGRAM(S): 80 TABLET, CHEWABLE ORAL at 06:30

## 2023-10-15 RX ADMIN — PANTOPRAZOLE SODIUM 40 MILLIGRAM(S): 20 TABLET, DELAYED RELEASE ORAL at 07:26

## 2023-10-15 RX ADMIN — REMDESIVIR 200 MILLIGRAM(S): 5 INJECTION INTRAVENOUS at 16:16

## 2023-10-15 RX ADMIN — Medication 3: at 12:51

## 2023-10-15 RX ADMIN — CARVEDILOL PHOSPHATE 6.25 MILLIGRAM(S): 80 CAPSULE, EXTENDED RELEASE ORAL at 06:30

## 2023-10-15 RX ADMIN — AMLODIPINE BESYLATE 10 MILLIGRAM(S): 2.5 TABLET ORAL at 07:26

## 2023-10-15 RX ADMIN — Medication 1: at 17:37

## 2023-10-15 RX ADMIN — SENNA PLUS 1 TABLET(S): 8.6 TABLET ORAL at 12:54

## 2023-10-15 NOTE — PROGRESS NOTE ADULT - PROBLEM SELECTOR PLAN 7
- Fluids: not indicated   - Replete Lytes to K>4, Mg>2 PRN   - Diet: DASH   - GI ppx: PPI   - PT Eval: home PT  - Dispo/SW: Awaiting shelter placement and dialysis acceptance.    Case discussed w/ Dr Painter

## 2023-10-15 NOTE — PROGRESS NOTE ADULT - SUBJECTIVE AND OBJECTIVE BOX
Cardiology PA Adult Progress Note    SUBJECTIVE ASSESSMENT: Overnight no acute events; Patient laying comfortably in bed. Anxious about the fact that she will have to eventually undergo staged PCI, but discussed the necessity with her and she understands the plan. She denies any episodes of chest pain or discomfort since the isolated episode during dialysis. Also reports she continues to feel better from a COVID perspective. Denies dizziness, palpitations, SOB, dyspnea, coughing, headaches, N/V/D/abdominal pain. Patient understands plan for the day.   	  MEDICATIONS:  amLODIPine   Tablet 10 milliGRAM(s) Oral daily  carvedilol 6.25 milliGRAM(s) Oral every 12 hours  nitroglycerin     SubLingual 0.4 milliGRAM(s) SubLingual once  remdesivir  IVPB 100 milliGRAM(s) IV Intermittent every 24 hours  remdesivir  IVPB   IV Intermittent   guaiFENesin Oral Liquid (Sugar-Free) 100 milliGRAM(s) Oral every 6 hours PRN  montelukast 10 milliGRAM(s) Oral daily  acetaminophen     Tablet .. 650 milliGRAM(s) Oral every 6 hours PRN  acetaminophen   IVPB .. 1000 milliGRAM(s) IV Intermittent once  gabapentin 100 milliGRAM(s) Oral <User Schedule>  pantoprazole    Tablet 40 milliGRAM(s) Oral before breakfast  polyethylene glycol 3350 17 Gram(s) Oral daily  senna 1 Tablet(s) Oral daily  simethicone 80 milliGRAM(s) Chew two times a day  atorvastatin 80 milliGRAM(s) Oral at bedtime  cinacalcet 60 milliGRAM(s) Oral daily  dextrose 50% Injectable 25 Gram(s) IV Push once  dextrose 50% Injectable 25 Gram(s) IV Push once  dextrose 50% Injectable 12.5 Gram(s) IV Push once  dextrose Oral Gel 15 Gram(s) Oral once PRN  glucagon  Injectable 1 milliGRAM(s) IntraMuscular once  insulin lispro (ADMELOG) corrective regimen sliding scale   SubCutaneous three times a day before meals  insulin lispro (ADMELOG) corrective regimen sliding scale   SubCutaneous at bedtime  aspirin enteric coated 81 milliGRAM(s) Oral daily  benzocaine/menthol Lozenge 1 Lozenge Oral two times a day PRN  clopidogrel Tablet 75 milliGRAM(s) Oral daily  dextrose 5%. 1000 milliLiter(s) IV Continuous <Continuous>  dextrose 5%. 1000 milliLiter(s) IV Continuous <Continuous>    	  VITAL SIGNS:  T(C): 36.4 (10-15-23 @ 08:35), Max: 36.5 (10-15-23 @ 00:20)  HR: 74 (10-15-23 @ 08:35) (64 - 80)  BP: 133/63 (10-15-23 @ 08:35) (133/63 - 152/69)  RR: 17 (10-15-23 @ 08:35) (16 - 20)  SpO2: 98% (10-15-23 @ 08:35) (97% - 98%)  Wt(kg): --    I&O's Summary    14 Oct 2023 07:01  -  15 Oct 2023 07:00  --------------------------------------------------------  IN: 330 mL / OUT: 0 mL / NET: 330 mL                                           PHYSICAL EXAM:  Appearance: Normal	  HEENT: Normal oral mucosa, PERRL, EOMI	  Neck: Supple, - JVD; no carotid Bruit   Cardiovascular: Normal S1 S2, No murmurs  Respiratory: Lungs clear to auscultation, no Rales, Rhonchi, Wheezing	  Gastrointestinal:  Soft, Non-tender, + BS	  Skin: No rashes, No ecchymoses, No cyanosis  Extremities: Normal range of motion, No clubbing, cyanosis or edema  Vascular: Peripheral pulses palpable 2+ bilaterally  Neurologic: Non-focal  Psychiatry: A & O x 3, Mood & affect appropriate    LABS:	 	                      10.3   5.42  )-----------( 204      ( 15 Oct 2023 05:30 )             34.1     10-15    140  |  94<L>  |  79<H>  ----------------------------<  111<H>  5.3   |  24  |  12.31<H>    Ca    8.8      15 Oct 2023 05:30  Mg     2.5     10-15    TPro  6.7  /  Alb  3.4  /  TBili  0.2  /  DBili  x   /  AST  29  /  ALT  11  /  AlkPhos  106  10-14

## 2023-10-15 NOTE — PROGRESS NOTE ADULT - SUBJECTIVE AND OBJECTIVE BOX
VALENTE CRAMER , [id number],  [facility] 05UR [id number]    Time of encounter : 10 am  resting, no shortness of breath.  saturating well on room air. intermittent cough.       T(C): 36.4 (10-15-23 @ 08:35), Max: 36.5 (10-15-23 @ 00:20)  HR: 74 (10-15-23 @ 08:35) (64 - 80)  BP: 133/63 (10-15-23 @ 08:35) (133/63 - 152/69)  RR: 17 (10-15-23 @ 08:35) (16 - 20)  SpO2: 98% (10-15-23 @ 08:35) (97% - 98%)    acetaminophen     Tablet .. 650 milliGRAM(s) Oral every 6 hours PRN  acetaminophen   IVPB .. 1000 milliGRAM(s) IV Intermittent once  amLODIPine   Tablet 10 milliGRAM(s) Oral daily  aspirin enteric coated 81 milliGRAM(s) Oral daily  atorvastatin 80 milliGRAM(s) Oral at bedtime  benzocaine/menthol Lozenge 1 Lozenge Oral two times a day PRN  carvedilol 12.5 milliGRAM(s) Oral every 12 hours  cinacalcet 60 milliGRAM(s) Oral daily  clopidogrel Tablet 75 milliGRAM(s) Oral daily  dextrose 5%. 1000 milliLiter(s) IV Continuous <Continuous>  dextrose 5%. 1000 milliLiter(s) IV Continuous <Continuous>  dextrose 50% Injectable 25 Gram(s) IV Push once  dextrose 50% Injectable 25 Gram(s) IV Push once  dextrose 50% Injectable 12.5 Gram(s) IV Push once  dextrose Oral Gel 15 Gram(s) Oral once PRN  gabapentin 100 milliGRAM(s) Oral <User Schedule>  glucagon  Injectable 1 milliGRAM(s) IntraMuscular once  guaiFENesin Oral Liquid (Sugar-Free) 100 milliGRAM(s) Oral every 6 hours PRN  insulin lispro (ADMELOG) corrective regimen sliding scale   SubCutaneous three times a day before meals  insulin lispro (ADMELOG) corrective regimen sliding scale   SubCutaneous at bedtime  montelukast 10 milliGRAM(s) Oral daily  nitroglycerin     SubLingual 0.4 milliGRAM(s) SubLingual once  pantoprazole    Tablet 40 milliGRAM(s) Oral before breakfast  polyethylene glycol 3350 17 Gram(s) Oral daily  remdesivir  IVPB   IV Intermittent   remdesivir  IVPB 100 milliGRAM(s) IV Intermittent every 24 hours  senna 1 Tablet(s) Oral daily  simethicone 80 milliGRAM(s) Chew two times a day      Physical Exam :    General exam :  saturating well on room air. speaking full sentence.  RRR  good air entry bilaterally  AvF on left arm.   no edema noted on lower ext.     CBC Full  -  ( 15 Oct 2023 05:30 )  WBC Count : 5.42 K/uL  RBC Count : 3.46 M/uL  Hemoglobin : 10.3 g/dL  Hematocrit : 34.1 %  Platelet Count - Automated : 204 K/uL  Mean Cell Volume : 98.6 fl  Mean Cell Hemoglobin : 29.8 pg  Mean Cell Hemoglobin Concentration : 30.2 gm/dL  Auto Neutrophil # : x  Auto Lymphocyte # : x  Auto Monocyte # : x  Auto Eosinophil # : x  Auto Basophil # : x  Auto Neutrophil % : x  Auto Lymphocyte % : x  Auto Monocyte % : x  Auto Eosinophil % : x  Auto Basophil % : x    CARDIAC MARKERS ( 13 Oct 2023 14:17 )  x     / x     / 54 U/L / x     / 2.1 ng/mL      10-15    140  |  94<L>  |  79<H>  ----------------------------<  111<H>  5.3   |  24  |  12.31<H>    Ca    8.8      15 Oct 2023 05:30  Mg     2.5     10-15    TPro  6.7  /  Alb  3.4  /  TBili  0.2  /  DBili  x   /  AST  29  /  ALT  11  /  AlkPhos  106  10-14    Daily     Daily   CAPILLARY BLOOD GLUCOSE      POCT Blood Glucose.: 271 mg/dL (15 Oct 2023 12:27)      Urinalysis Basic - ( 15 Oct 2023 05:30 )    Color: x / Appearance: x / SG: x / pH: x  Gluc: 111 mg/dL / Ketone: x  / Bili: x / Urobili: x   Blood: x / Protein: x / Nitrite: x   Leuk Esterase: x / RBC: x / WBC x   Sq Epi: x / Non Sq Epi: x / Bacteria: x

## 2023-10-15 NOTE — PROGRESS NOTE ADULT - ASSESSMENT
73F with hx of HTN, HLD, DM2, 3vCAD with mLAD 75%, pLCx 70%, and tortuous pRCA 90%, and ESRD on dialysis MWF presenting as a transfer from [facility] for NSTEMI and possible staged PCI. Now s/p LHC with p-mRCA 90% ISR s/p Rota/JAMIL x1 and residual LAD 75%. Plan for Staged PCI for residual LAD lesion in 5 weeks. Course complicated by COVID infection (day 0 10/12/2023); now pending shelter and dialysis acceptance.

## 2023-10-15 NOTE — PROGRESS NOTE ADULT - PROBLEM SELECTOR PLAN 4
- c/o sore throat and low grade temp on 10/12. Found to be COVID+ 10/12/23  - Continue Montelukast 10mg QD and supportive management  - c/w remdesivir taper, initiated 10/13/2023 (day 3/5)

## 2023-10-15 NOTE — PROGRESS NOTE ADULT - ASSESSMENT
This is a 73F with PMHX of CAD s/p PCI (2017) and recent T1 NSTEMI (s/p recent cardiac cath), ESRD on HD (via LUE AVF), HTN, T2DM, gout, fibroids who presents with a chief complaint of NSTEMI- admitted 10/9-  Medicine consulted for COVID- positive on 10/12/23 ( cough )  event reviewed.    Covid -mild, dose not require oxygen- reported cough started on 10/12-     - [facility] Guideline for Covid treatment - for Remdesivir -" listed on Page 10 Under Contraindication--:• Not recommended in adult patients with eGFR less than 30 mL/min unless the potential benefit outweighs the potential risk." accessed  10/13/2023.    Covid 19 treatment guideline [facility]- "updated prescription information for Remdesivir to indicate that it can be used without dose adjustment in patient with an estimated glomerular filtration rate (eGFR) of <30 ml/min including those receiving dialysis"   https://www.vuocc53xmfqlvrvlhswtbqhfiy.nih.gov/therapies/antivirals-including-antibody-products/remdesivir/-accessed 10/13/2023    follow up with renal counterpart for treatment with Remdesivir, given her risk from comorbidities-, symptom less than 7 days- may start Remdesivir treatemnt ( usually start with 200 mg iv on day one then 100 mg daily on day 2-5 or stop when pt is ready for discharge.   continue with monitoring for saturation  - team started Remdesivir 200 mg x 1 on 10/13- plan for 100 mg q daily 10/14 to 10/17 or till discharge if earlier than 17  - supportive care.  - anti-tussive - Robitussin DM 10 ml po q 8 hourly -pt advised to ask as needed.  - montelukast 10 mg po q daily   - if sign of bronchospasm - would add symbicort nebs bid ( currently not )  - monitor saturation - if hypoxic - saturation less than 92- to start oxygen supplement and Dexa 6 mg per daily along with Protonix     - DM - with some hyperglycemia  currently on SS   to start lantus 5 units sq daily     renal follow up for ESRD on HD ( MWF )  CAD/NSTEMI - care by primary team appreciated-  SW help appreciated for discharge planning : Shelter.

## 2023-10-15 NOTE — PROGRESS NOTE ADULT - PROBLEM SELECTOR PLAN 1
- DAPT: ASA 81mg + Plavix 75mg   - Continue Atorvastatin 80mg QHS   - [facility] [place] [place] with mLAD 75%, pLCx 70%, and tortous pRCA 90%; s/p Heparin gtt for ACS coverage   - [facility] (10/10/23): Rota/JAMIL x1 to p-mRCA (90% ISR), LAD 75%. Plan for Staged PCI of residual LAD lesion in 5 weeks   - TTE (10/01/23): LV EF 60-65%, LVH, normal wall motion. G1DD. Trace MR. Aortic valve mildly calcified  - Patient experienced 1 episode of angina 10/13 while at rest, self resolved; EKG with TWI not seen previously on EKG from 10/11. Given known residual disease, will consider performing staged intervention during this admission if symptoms continue to occur.

## 2023-10-16 LAB
ANION GAP SERPL CALC-SCNC: 26 MMOL/L — HIGH (ref 5–17)
BUN SERPL-MCNC: 107 MG/DL — HIGH (ref 7–23)
CALCIUM SERPL-MCNC: 7.6 MG/DL — LOW (ref 8.4–10.5)
CHLORIDE SERPL-SCNC: 89 MMOL/L — LOW (ref 96–108)
CO2 SERPL-SCNC: 20 MMOL/L — LOW (ref 22–31)
CREAT SERPL-MCNC: 13.29 MG/DL — HIGH (ref 0.5–1.3)
EGFR: 3 ML/MIN/1.73M2 — LOW
GLUCOSE BLDC GLUCOMTR-MCNC: 136 MG/DL — HIGH (ref 70–99)
GLUCOSE BLDC GLUCOMTR-MCNC: 244 MG/DL — HIGH (ref 70–99)
GLUCOSE BLDC GLUCOMTR-MCNC: 244 MG/DL — HIGH (ref 70–99)
GLUCOSE BLDC GLUCOMTR-MCNC: 249 MG/DL — HIGH (ref 70–99)
GLUCOSE BLDC GLUCOMTR-MCNC: 249 MG/DL — HIGH (ref 70–99)
GLUCOSE BLDC GLUCOMTR-MCNC: 311 MG/DL — HIGH (ref 70–99)
GLUCOSE SERPL-MCNC: 124 MG/DL — HIGH (ref 70–99)
HCT VFR BLD CALC: 33.1 % — LOW (ref 34.5–45)
HGB BLD-MCNC: 9.7 G/DL — LOW (ref 11.5–15.5)
MAGNESIUM SERPL-MCNC: 2.4 MG/DL — SIGNIFICANT CHANGE UP (ref 1.6–2.6)
MCHC RBC-ENTMCNC: 29.3 GM/DL — LOW (ref 32–36)
MCHC RBC-ENTMCNC: 29.3 PG — SIGNIFICANT CHANGE UP (ref 27–34)
MCV RBC AUTO: 100 FL — SIGNIFICANT CHANGE UP (ref 80–100)
NRBC # BLD: 0 /100 WBCS — SIGNIFICANT CHANGE UP (ref 0–0)
PLATELET # BLD AUTO: 207 K/UL — SIGNIFICANT CHANGE UP (ref 150–400)
POTASSIUM SERPL-MCNC: 5.5 MMOL/L — HIGH (ref 3.5–5.3)
POTASSIUM SERPL-SCNC: 5.5 MMOL/L — HIGH (ref 3.5–5.3)
RBC # BLD: 3.31 M/UL — LOW (ref 3.8–5.2)
RBC # FLD: 16.4 % — HIGH (ref 10.3–14.5)
SODIUM SERPL-SCNC: 135 MMOL/L — SIGNIFICANT CHANGE UP (ref 135–145)
WBC # BLD: 5.35 K/UL — SIGNIFICANT CHANGE UP (ref 3.8–10.5)
WBC # FLD AUTO: 5.35 K/UL — SIGNIFICANT CHANGE UP (ref 3.8–10.5)

## 2023-10-16 PROCEDURE — 99233 SBSQ HOSP IP/OBS HIGH 50: CPT

## 2023-10-16 PROCEDURE — 99232 SBSQ HOSP IP/OBS MODERATE 35: CPT

## 2023-10-16 RX ORDER — ERYTHROPOIETIN 10000 [IU]/ML
8000 INJECTION, SOLUTION INTRAVENOUS; SUBCUTANEOUS ONCE
Refills: 0 | Status: COMPLETED | OUTPATIENT
Start: 2023-10-16 | End: 2023-10-16

## 2023-10-16 RX ORDER — ACETAMINOPHEN 500 MG
650 TABLET ORAL EVERY 6 HOURS
Refills: 0 | Status: DISCONTINUED | OUTPATIENT
Start: 2023-10-16 | End: 2023-10-22

## 2023-10-16 RX ADMIN — SIMETHICONE 80 MILLIGRAM(S): 80 TABLET, CHEWABLE ORAL at 17:16

## 2023-10-16 RX ADMIN — Medication 100 MILLIGRAM(S): at 10:17

## 2023-10-16 RX ADMIN — ERYTHROPOIETIN 8000 UNIT(S): 10000 INJECTION, SOLUTION INTRAVENOUS; SUBCUTANEOUS at 13:18

## 2023-10-16 RX ADMIN — CINACALCET 60 MILLIGRAM(S): 30 TABLET, FILM COATED ORAL at 16:14

## 2023-10-16 RX ADMIN — GABAPENTIN 100 MILLIGRAM(S): 400 CAPSULE ORAL at 21:50

## 2023-10-16 RX ADMIN — PANTOPRAZOLE SODIUM 40 MILLIGRAM(S): 20 TABLET, DELAYED RELEASE ORAL at 07:56

## 2023-10-16 RX ADMIN — INSULIN GLARGINE 5 UNIT(S): 100 INJECTION, SOLUTION SUBCUTANEOUS at 21:51

## 2023-10-16 RX ADMIN — Medication 4: at 13:17

## 2023-10-16 RX ADMIN — SENNA PLUS 1 TABLET(S): 8.6 TABLET ORAL at 16:13

## 2023-10-16 RX ADMIN — MONTELUKAST 10 MILLIGRAM(S): 4 TABLET, CHEWABLE ORAL at 16:13

## 2023-10-16 RX ADMIN — SIMETHICONE 80 MILLIGRAM(S): 80 TABLET, CHEWABLE ORAL at 07:57

## 2023-10-16 RX ADMIN — Medication 2: at 17:40

## 2023-10-16 RX ADMIN — Medication 81 MILLIGRAM(S): at 16:13

## 2023-10-16 RX ADMIN — POLYETHYLENE GLYCOL 3350 17 GRAM(S): 17 POWDER, FOR SOLUTION ORAL at 16:14

## 2023-10-16 RX ADMIN — AMLODIPINE BESYLATE 10 MILLIGRAM(S): 2.5 TABLET ORAL at 07:56

## 2023-10-16 RX ADMIN — Medication 650 MILLIGRAM(S): at 17:12

## 2023-10-16 RX ADMIN — ATORVASTATIN CALCIUM 80 MILLIGRAM(S): 80 TABLET, FILM COATED ORAL at 21:50

## 2023-10-16 RX ADMIN — CLOPIDOGREL BISULFATE 75 MILLIGRAM(S): 75 TABLET, FILM COATED ORAL at 16:13

## 2023-10-16 RX ADMIN — Medication 650 MILLIGRAM(S): at 16:12

## 2023-10-16 RX ADMIN — CARVEDILOL PHOSPHATE 12.5 MILLIGRAM(S): 80 CAPSULE, EXTENDED RELEASE ORAL at 21:51

## 2023-10-16 RX ADMIN — REMDESIVIR 200 MILLIGRAM(S): 5 INJECTION INTRAVENOUS at 16:15

## 2023-10-16 NOTE — PROGRESS NOTE ADULT - ASSESSMENT
73F ESRD admitted for NSTEMI s/p cardiac cath, now h/c c/b COVID PNA, HD 10/13 terminated due to chest pain with EKG changes, stable volume status, electrolytes noted for K+5.5, pending shelter and HD acceptance, continue in patient HD management       ESRD  Electrolytes noted, to correct with HD   EDW: 74kg     Plan:  HD today as below  Hemodialysis Treatment.:     Schedule: Once, Modality: Hemodialysis, Access: Arteriovenous Fistula    Dialyzer: Optiflux K550GIi, Time: 180 Min    Blood Flow: 400 mL/Min , Dialysate Flow: 500 mL/Min, Dialysate Temp: 35, Tubinmm (Adult)    Target Fluid Removal: 0.5 Liters    Dialysate Electrolytes (mEq/L): Potassium 2, Calcium 2.5, Sodium 138, Bicarbonate 35   Daily BMP   Next HD if still admitted 10/18  Renal diet     Access:  LUE AVF functional     HTN:  BP stable  Hold meds on HD days for optimal UF, can resume post HD if BP still elevated for better control   UF with HD as tolerated     Anemia:  Hgb at goal 9.7  EPO with HD     MBD:  Calcium: 7.6  Phos: 6.4

## 2023-10-16 NOTE — PROGRESS NOTE ADULT - NS ATTEND AMEND GEN_ALL_CORE FT
74 yo female, residing in shelter, with PMHx of ESRD on HD via left arm fistula (MWF), HTN, DM-II, CAD s/p PCI 2017, gout, and fibroids, admitted for NSTEMI.     1. CAD/NSTEMI type 1  S/p cardiac cath on 10/8/23 with Dr. Wilson revealing 3VCAD. LM minimal LI, severely calcified. mLAD 75% severly calcified, tortous. pLCx 70%, tortous. pRCA 90% at edge of previous stent, tortous.   Continue aspirin 81 mg and Plavix 75 mg. Atorvastatin 80 mg.    2. ESRD HD  HD tomorrow after PCI.    3. Hypertension  Continue amlodipine 10 mg.  Coreg 12.5 mg bid.    4. Anemia  Probably chronic disease, normal TSH, B12, Bilirubin,  and Folate.    5. COVID  S/p redemsivir, stable.

## 2023-10-16 NOTE — PROGRESS NOTE ADULT - SUBJECTIVE AND OBJECTIVE BOX
Patient is a 73y Female seen and evaluated at bedside. Weekend events noted, NAD, does not offer any complaint. Last HD on 10/13 [id number] 45min with 0.5L UF due to complain of chest pain and t wave inversions seen on repeat EKG. Stable over weekend, K+ 5.5, HD today as per schedule.       Meds:    acetaminophen     Tablet .. 650 every 6 hours PRN  acetaminophen   IVPB .. 1000 once  amLODIPine   Tablet 10 daily  aspirin enteric coated 81 daily  atorvastatin 80 at bedtime  benzocaine/menthol Lozenge 1 two times a day PRN  carvedilol 12.5 every 12 hours  cinacalcet 60 daily  clopidogrel Tablet 75 daily  dextrose 5%. 1000 <Continuous>  dextrose 5%. 1000 <Continuous>  dextrose 50% Injectable 25 once  dextrose 50% Injectable 25 once  dextrose 50% Injectable 12.5 once  dextrose Oral Gel 15 once PRN  epoetin gamaliel-epbx (RETACRIT) Injectable 8000 once  gabapentin 100 <User Schedule>  glucagon  Injectable 1 once  guaiFENesin Oral Liquid (Sugar-Free) 100 every 6 hours PRN  insulin glargine Injectable (LANTUS) 5 at bedtime  insulin lispro (ADMELOG) corrective regimen sliding scale  three times a day before meals  insulin lispro (ADMELOG) corrective regimen sliding scale  at bedtime  montelukast 10 daily  nitroglycerin     SubLingual 0.4 once  pantoprazole    Tablet 40 before breakfast  polyethylene glycol 3350 17 daily  remdesivir  IVPB    remdesivir  IVPB 100 every 24 hours  senna 1 daily  simethicone 80 two times a day      T(C): , Max: 36.7 (10-15-23 @ 16:16)  T(F): , Max: 98.1 (10-15-23 @ 16:16)  HR: 76 (10-16-23 @ 08:31)  BP: 120/59 (10-16-23 @ 08:31)  BP(mean): 85 (10-16-23 @ 08:31)  RR: 18 (10-16-23 @ 08:31)  SpO2: 98% (10-16-23 @ 08:31)  Wt(kg): --    10-15 @ 07:01  -  10-16 @ 07:00  --------------------------------------------------------  IN: 360 mL / OUT: 0 mL / NET: 360 mL          Review of Systems:  ROS negative except as per HPI      PHYSICAL EXAM:  GENERAL: Alert, awake, NAD    CHEST/LUNG: Bilateral clear breath sounds  HEART: Regular rate and rhythm  ABDOMEN: Soft, nontender, non distended  EXTREMITIES: no pedal edema  Neurology: AAOx3, no focal neurological deficit  ACCESS: JEMMA STIEN w/bruit and thrill       LABS:                        9.7    5.35  )-----------( 207      ( 16 Oct 2023 05:30 )             33.1     10-16    135  |  89<L>  |  107<H>  ----------------------------<  124<H>  5.5<H>   |  20<L>  |  13.29<H>    Ca    7.6<L>      16 Oct 2023 05:30  Mg     2.4     10-16          Urinalysis Basic - ( 16 Oct 2023 05:30 )    Color: x / Appearance: x / SG: x / pH: x  Gluc: 124 mg/dL / Ketone: x  / Bili: x / Urobili: x   Blood: x / Protein: x / Nitrite: x   Leuk Esterase: x / RBC: x / WBC x   Sq Epi: x / Non Sq Epi: x / Bacteria: x            RADIOLOGY & ADDITIONAL STUDIES:

## 2023-10-16 NOTE — PROGRESS NOTE ADULT - PROBLEM SELECTOR PLAN 1
- DAPT: ASA 81mg + Plavix 75mg   - Continue Atorvastatin 80mg QHS   - [facility] ([facility] [place]): 3vCAD with mLAD 75%, pLCx 70%, and tortous pRCA 90%; s/p Heparin gtt for ACS coverage   - [facility] (10/10/23): Rota/JAMIL x1 to p-mRCA (90% ISR), LAD 75%. Plan for Staged PCI of residual LAD lesion in 5 weeks   - TTE (10/01/23): LV EF 60-65%, LVH, normal wall motion. G1DD. Trace MR. Aortic valve mildly calcified  - Patient experienced 1 episode of angina 10/13 while at rest, self resolved; EKG with TWI not seen previously on EKG from 10/11. Given known residual disease, will consider performing staged intervention during this admission if symptoms continue to occur.

## 2023-10-16 NOTE — PROGRESS NOTE ADULT - ASSESSMENT
This is a 73F with PMHX of CAD s/p PCI (2017) and recent T1 NSTEMI (s/p recent cardiac cath), ESRD on HD (via LUE AVF), HTN, T2DM, gout, fibroids who presents with a chief complaint of NSTEMI. Medicine consulted for COVID.     #COVID-19 - IMPROVING  PCR+ on 10/12, reports sore throat. Afebrile. WBC WNL. On RA 96%. No reported history of asthma/copd. Vaccinated with "multiple boosters" per patient (J&J).  Unable to receive paxlovid given CrCl <30. Does not qualify for dexamethasone given not on mechanical ventilation.   - c/w Remdesivir x5 day course (ending 10/17); discussed with cardiology PA and renal that remdesivir can be given in HD  - Supportive care:  - Standing tylenol 650mg q6hrs for fever  - C/w Robitussin-DM every 8 hours  - C/w Montelukast 10mg qd  - Cepacol lozenges for sore throat  - DVT PPX while inpatient    Med consult will continue to follow.  Thank you for this consult. Please page [phone] for any questions.  Recommendations not final until attestation signed by attending. This is a 73F with PMHX of CAD s/p PCI (2017) and recent T1 NSTEMI (s/p recent cardiac cath), ESRD on HD (via LUE AVF), HTN, T2DM, gout, fibroids who presents with a chief complaint of NSTEMI. Medicine consulted for COVID.     #COVID-19 - IMPROVING  PCR+ on 10/12, reports sore throat. Afebrile. WBC WNL. On RA 96%. No reported history of asthma/copd. Vaccinated with "multiple boosters" per patient (J&J).  Unable to receive paxlovid given CrCl <30. Does not qualify for dexamethasone given not on mechanical ventilation.   - c/w Remdesivir x5 day course (ending 10/17); discussed with cardiology PA and renal that remdesivir can be given in HD  - Supportive care:  - Standing tylenol 650mg q6hrs for fever  - C/w Robitussin-DM every 8 hours  - C/w Montelukast 10mg qd  - Cepacol lozenges for sore throat  - DVT PPX while inpatient    #T2DM (A1c 5.1% 10/2023)  - C/w lantus 5u qhs  - AM   - FS q6hrs  - C/w mISS    Med consult will continue to follow.  Thank you for this consult. Please page [phone] for any questions.  Recommendations not final until attestation signed by attending.

## 2023-10-16 NOTE — PROGRESS NOTE ADULT - SUBJECTIVE AND OBJECTIVE BOX
Patient seen on HD tolerating procedure well. Patient does not offer any complaints and is hemodynamically stable.  Continue HD as prescribed.   Hemodialysis Treatment.:     Schedule: Once, Modality: Hemodialysis, Access: Arteriovenous Fistula    Dialyzer: Optiflux [id number], Time: 180 Min    Blood Flow: 400 mL/Min , Dialysate Flow: 500 mL/Min, Dialysate Temp: 35, Tubinmm (Adult)    Target Fluid Removal: 0.5 Liters    Dialysate Electrolytes (mEq/L): Potassium 2, Calcium 2.5, Sodium 138, Bicarbonate 35      Vitals   T(F): 97.9  HR: 76  BP: 120/59  RR: 18  SpO2: 98%            PHYSICAL EXAM:  GENERAL: Alert, awake, NAD laying in bed tolerating HD   HEENT: AMY, EOMI, neck supple, no JVP  CHEST/LUNG: Bilateral clear breath sounds  HEART: Regular rate and rhythm, no murmur, no gallops, no rub   ABDOMEN: Soft, nontender, non distended  : No flank or supra pubic tenderness.  EXTREMITIES: no pedal edema  Neurology: AAOx3, no focal neurological deficit  SKIN: No rash or skin lesion   ACCESS: LUE AVF accessed

## 2023-10-16 NOTE — PROGRESS NOTE ADULT - PROBLEM SELECTOR PLAN 7
- Fluids: not indicated   - Replete Lytes to K>4, Mg>2 PRN   - Diet: DASH   - GI ppx: PPI   - PT Eval: home PT  - Dispo/SW: Awaiting shelter placement w/dialysis acceptance.    Case discussed w/ Dr Alatorre

## 2023-10-16 NOTE — PROGRESS NOTE ADULT - SUBJECTIVE AND OBJECTIVE BOX
OVERNIGHT EVENTS: PREMA. On RA.    SUBJECTIVE:  Patient seen and examined at bedside. Resting comfortably, denies cough, myalgias, subjective fevers.    Vital Signs Last 12 Hrs  T(F): 97.9 (10-16-23 @ 08:31), Max: 97.9 (10-16-23 @ 08:31)  HR: 76 (10-16-23 @ 08:31) (72 - 76)  BP: 120/59 (10-16-23 @ 08:31) (120/59 - 138/74)  BP(mean): 85 (10-16-23 @ 08:31) (85 - 85)  RR: 18 (10-16-23 @ 08:31) (8 - 20)  SpO2: 98% (10-16-23 @ 08:31) (94% - 98%)  I&O's Summary    15 Oct 2023 07:01  -  16 Oct 2023 07:00  --------------------------------------------------------  IN: 360 mL / OUT: 0 mL / NET: 360 mL      PHYSICAL EXAM:  Constitutional: NAD, comfortable in bed. Tired appearing.  HEENT: NC/AT, PERRLA, EOMI, no conjunctival pallor or scleral icterus, MMM  Neck: Supple, no JVD  Respiratory: CTA B/L. No w/r/r.   Cardiovascular: RRR, normal S1 and S2, no m/r/g.   Gastrointestinal: +BS, soft NTND, no guarding or rebound tenderness, no palpable masses   Extremities: wwp; no cyanosis, clubbing or edema.   Vascular: LUE AVF. Pulses equal and strong throughout.   Neurological: AAOx3, no CN deficits, strength and sensation intact throughout.   Skin: No gross skin abnormalities or rashes      LABS:                        9.7    5.35  )-----------( 207      ( 16 Oct 2023 05:30 )             33.1     10-16    135  |  89<L>  |  107<H>  ----------------------------<  124<H>  5.5<H>   |  20<L>  |  13.29<H>    Ca    7.6<L>      16 Oct 2023 05:30  Mg     2.4     10-16        Urinalysis Basic - ( 16 Oct 2023 05:30 )    Color: x / Appearance: x / SG: x / pH: x  Gluc: 124 mg/dL / Ketone: x  / Bili: x / Urobili: x   Blood: x / Protein: x / Nitrite: x   Leuk Esterase: x / RBC: x / WBC x   Sq Epi: x / Non Sq Epi: x / Bacteria: x          RADIOLOGY & ADDITIONAL TESTS:    MEDICATIONS  (STANDING):  acetaminophen   IVPB .. 1000 milliGRAM(s) IV Intermittent once  amLODIPine   Tablet 10 milliGRAM(s) Oral daily  aspirin enteric coated 81 milliGRAM(s) Oral daily  atorvastatin 80 milliGRAM(s) Oral at bedtime  carvedilol 12.5 milliGRAM(s) Oral every 12 hours  cinacalcet 60 milliGRAM(s) Oral daily  clopidogrel Tablet 75 milliGRAM(s) Oral daily  dextrose 5%. 1000 milliLiter(s) (100 mL/Hr) IV Continuous <Continuous>  dextrose 5%. 1000 milliLiter(s) (50 mL/Hr) IV Continuous <Continuous>  dextrose 50% Injectable 25 Gram(s) IV Push once  dextrose 50% Injectable 12.5 Gram(s) IV Push once  dextrose 50% Injectable 25 Gram(s) IV Push once  epoetin gamaliel-epbx (RETACRIT) Injectable 8000 Unit(s) IV Push once  gabapentin 100 milliGRAM(s) Oral <User Schedule>  glucagon  Injectable 1 milliGRAM(s) IntraMuscular once  insulin glargine Injectable (LANTUS) 5 Unit(s) SubCutaneous at bedtime  insulin lispro (ADMELOG) corrective regimen sliding scale   SubCutaneous at bedtime  insulin lispro (ADMELOG) corrective regimen sliding scale   SubCutaneous three times a day before meals  montelukast 10 milliGRAM(s) Oral daily  nitroglycerin     SubLingual 0.4 milliGRAM(s) SubLingual once  pantoprazole    Tablet 40 milliGRAM(s) Oral before breakfast  polyethylene glycol 3350 17 Gram(s) Oral daily  remdesivir  IVPB 100 milliGRAM(s) IV Intermittent every 24 hours  remdesivir  IVPB   IV Intermittent   senna 1 Tablet(s) Oral daily  simethicone 80 milliGRAM(s) Chew two times a day    MEDICATIONS  (PRN):  acetaminophen     Tablet .. 650 milliGRAM(s) Oral every 6 hours PRN Temp greater or equal to 38C (100.4F)  benzocaine/menthol Lozenge 1 Lozenge Oral two times a day PRN Sore Throat  dextrose Oral Gel 15 Gram(s) Oral once PRN Blood Glucose LESS THAN 70 milliGRAM(s)/deciliter  guaiFENesin Oral Liquid (Sugar-Free) 100 milliGRAM(s) Oral every 6 hours PRN Cough

## 2023-10-16 NOTE — PROGRESS NOTE ADULT - ASSESSMENT
73F with hx of HTN, HLD, DM2, 3vCAD with mLAD 75%, pLCx 70%, and tortuous pRCA 90%, and ESRD on dialysis MWF presenting as a transfer from [facility] for NSTEMI and possible staged PCI. Now s/p LHC with p-mRCA 90% ISR s/p Rota/JAMIL x1 and residual LAD 75%. Plan for Staged PCI for residual LAD lesion in 5 weeks. Course complicated by COVID infection (day 0 10/12/2023); now pending shelter and dialysis acceptance.

## 2023-10-16 NOTE — PROGRESS NOTE ADULT - SUBJECTIVE AND OBJECTIVE BOX
Interventional Cardiology PA Adult Progress Note    Subjective Assessment: Patient seen and examined at bedside.   	  MEDICATIONS:  amLODIPine   Tablet 10 milliGRAM(s) Oral daily  carvedilol 12.5 milliGRAM(s) Oral every 12 hours  nitroglycerin     SubLingual 0.4 milliGRAM(s) SubLingual once    remdesivir  IVPB 100 milliGRAM(s) IV Intermittent every 24 hours  remdesivir  IVPB   IV Intermittent     guaiFENesin Oral Liquid (Sugar-Free) 100 milliGRAM(s) Oral every 6 hours PRN  montelukast 10 milliGRAM(s) Oral daily    acetaminophen     Tablet .. 650 milliGRAM(s) Oral every 6 hours PRN  acetaminophen   IVPB .. 1000 milliGRAM(s) IV Intermittent once  gabapentin 100 milliGRAM(s) Oral <User Schedule>    pantoprazole    Tablet 40 milliGRAM(s) Oral before breakfast  polyethylene glycol 3350 17 Gram(s) Oral daily  senna 1 Tablet(s) Oral daily  simethicone 80 milliGRAM(s) Chew two times a day    atorvastatin 80 milliGRAM(s) Oral at bedtime  cinacalcet 60 milliGRAM(s) Oral daily  dextrose 50% Injectable 25 Gram(s) IV Push once  dextrose 50% Injectable 12.5 Gram(s) IV Push once  dextrose 50% Injectable 25 Gram(s) IV Push once  dextrose Oral Gel 15 Gram(s) Oral once PRN  glucagon  Injectable 1 milliGRAM(s) IntraMuscular once  insulin glargine Injectable (LANTUS) 5 Unit(s) SubCutaneous at bedtime  insulin lispro (ADMELOG) corrective regimen sliding scale   SubCutaneous at bedtime  insulin lispro (ADMELOG) corrective regimen sliding scale   SubCutaneous three times a day before meals    aspirin enteric coated 81 milliGRAM(s) Oral daily  benzocaine/menthol Lozenge 1 Lozenge Oral two times a day PRN  clopidogrel Tablet 75 milliGRAM(s) Oral daily  dextrose 5%. 1000 milliLiter(s) IV Continuous <Continuous>  dextrose 5%. 1000 milliLiter(s) IV Continuous <Continuous>  epoetin gamaliel-epbx (RETACRIT) Injectable 8000 Unit(s) IV Push once        [PHYSICAL EXAM:  TELEMETRY:  T(C): 36.6 (10-16-23 @ 08:31), Max: 36.7 (10-15-23 @ 16:16)  HR: 76 (10-16-23 @ 08:31) (70 - 76)  BP: 120/59 (10-16-23 @ 08:31) (120/59 - 140/63)  RR: 18 (10-16-23 @ 08:31) (8 - 20)  SpO2: 98% (10-16-23 @ 08:31) (94% - 98%)  Wt(kg): --  I&O's Summary    15 Oct 2023 07:01  -  16 Oct 2023 07:00  --------------------------------------------------------  IN: 360 mL / OUT: 0 mL / NET: 360 mL          	    LABS:	 	  CARDIAC MARKERS:                          9.7    5.35  )-----------( 207      ( 16 Oct 2023 05:30 )             33.1     10-16    135  |  89<L>  |  107<H>  ----------------------------<  124<H>  5.5<H>   |  20<L>  |  13.29<H>    Ca    7.6<L>      16 Oct 2023 05:30  Mg     2.4     10-16      proBNP:   Lipid Profile:   HgA1c:   TSH:        Interventional Cardiology PA Adult Progress Note    Subjective Assessment: Patient seen and examined at bedside.   	  MEDICATIONS:  amLODIPine   Tablet 10 milliGRAM(s) Oral daily  carvedilol 12.5 milliGRAM(s) Oral every 12 hours  nitroglycerin     SubLingual 0.4 milliGRAM(s) SubLingual once    remdesivir  IVPB 100 milliGRAM(s) IV Intermittent every 24 hours  remdesivir  IVPB   IV Intermittent     guaiFENesin Oral Liquid (Sugar-Free) 100 milliGRAM(s) Oral every 6 hours PRN  montelukast 10 milliGRAM(s) Oral daily    acetaminophen     Tablet .. 650 milliGRAM(s) Oral every 6 hours PRN  acetaminophen   IVPB .. 1000 milliGRAM(s) IV Intermittent once  gabapentin 100 milliGRAM(s) Oral <User Schedule>    pantoprazole    Tablet 40 milliGRAM(s) Oral before breakfast  polyethylene glycol 3350 17 Gram(s) Oral daily  senna 1 Tablet(s) Oral daily  simethicone 80 milliGRAM(s) Chew two times a day    atorvastatin 80 milliGRAM(s) Oral at bedtime  cinacalcet 60 milliGRAM(s) Oral daily  dextrose 50% Injectable 25 Gram(s) IV Push once  dextrose 50% Injectable 12.5 Gram(s) IV Push once  dextrose 50% Injectable 25 Gram(s) IV Push once  dextrose Oral Gel 15 Gram(s) Oral once PRN  glucagon  Injectable 1 milliGRAM(s) IntraMuscular once  insulin glargine Injectable (LANTUS) 5 Unit(s) SubCutaneous at bedtime  insulin lispro (ADMELOG) corrective regimen sliding scale   SubCutaneous at bedtime  insulin lispro (ADMELOG) corrective regimen sliding scale   SubCutaneous three times a day before meals    aspirin enteric coated 81 milliGRAM(s) Oral daily  benzocaine/menthol Lozenge 1 Lozenge Oral two times a day PRN  clopidogrel Tablet 75 milliGRAM(s) Oral daily  dextrose 5%. 1000 milliLiter(s) IV Continuous <Continuous>  dextrose 5%. 1000 milliLiter(s) IV Continuous <Continuous>  epoetin gamaliel-epbx (RETACRIT) Injectable 8000 Unit(s) IV Push once        [PHYSICAL EXAM:  TELEMETRY:  T(C): 36.6 (10-16-23 @ 08:31), Max: 36.7 (10-15-23 @ 16:16)  HR: 76 (10-16-23 @ 08:31) (70 - 76)  BP: 120/59 (10-16-23 @ 08:31) (120/59 - 140/63)  RR: 18 (10-16-23 @ 08:31) (8 - 20)  SpO2: 98% (10-16-23 @ 08:31) (94% - 98%)  Wt(kg): --  I&O's Summary    15 Oct 2023 07:01  -  16 Oct 2023 07:00  --------------------------------------------------------  IN: 360 mL / OUT: 0 mL / NET: 360 mL      PHYSICAL EXAM:  Appearance: in NAD in bed  HEENT: moist oral mucosa, + poor dentition and missing teeth; PERRL, EOMI	  Neck: Supple, - JVD; no carotid Bruit   Cardiovascular: Normal S1 S2, soft sys 2/6 murmur  Respiratory: Lungs clear to auscultation, no Rales, Rhonchi, Wheezing	  Gastrointestinal:  Soft, Non-tender, + BS	  Skin: No rashes, No ecchymoses, No cyanosis  Extremities: Normal range of motion, No clubbing, cyanosis or edema  Vascular: Peripheral pulses palpable 2+ bilaterally  Neurologic: Non-focal  Psychiatry: A & O x 3, calm/flat affect in bed, on phone    	    LABS:	 	  CARDIAC MARKERS:                          9.7    5.35  )-----------( 207      ( 16 Oct 2023 05:30 )             33.1     10-16    135  |  89<L>  |  107<H>  ----------------------------<  124<H>  5.5<H>   |  20<L>  |  13.29<H>    Ca    7.6<L>      16 Oct 2023 05:30  Mg     2.4     10-16      proBNP:   Lipid Profile:   HgA1c:   TSH:

## 2023-10-16 NOTE — PROGRESS NOTE ADULT - PROBLEM SELECTOR PLAN 2
- HD via LUE AVF (MWF). Plan for HD today  - Continue Cinacalcet 60mg QD   - Renal following, appreciate recs

## 2023-10-17 PROBLEM — Z00.00 ENCOUNTER FOR PREVENTIVE HEALTH EXAMINATION: Status: ACTIVE | Noted: 2023-10-17

## 2023-10-17 LAB
ALBUMIN SERPL ELPH-MCNC: 3.1 G/DL — LOW (ref 3.3–5)
ALBUMIN SERPL ELPH-MCNC: 3.1 G/DL — LOW (ref 3.3–5)
ALP SERPL-CCNC: 111 U/L — SIGNIFICANT CHANGE UP (ref 40–120)
ALP SERPL-CCNC: 111 U/L — SIGNIFICANT CHANGE UP (ref 40–120)
ALT FLD-CCNC: 11 U/L — SIGNIFICANT CHANGE UP (ref 10–45)
ALT FLD-CCNC: 11 U/L — SIGNIFICANT CHANGE UP (ref 10–45)
ANION GAP SERPL CALC-SCNC: 15 MMOL/L — SIGNIFICANT CHANGE UP (ref 5–17)
ANION GAP SERPL CALC-SCNC: 15 MMOL/L — SIGNIFICANT CHANGE UP (ref 5–17)
AST SERPL-CCNC: 19 U/L — SIGNIFICANT CHANGE UP (ref 10–40)
AST SERPL-CCNC: 19 U/L — SIGNIFICANT CHANGE UP (ref 10–40)
BILIRUB SERPL-MCNC: 0.2 MG/DL — SIGNIFICANT CHANGE UP (ref 0.2–1.2)
BILIRUB SERPL-MCNC: 0.2 MG/DL — SIGNIFICANT CHANGE UP (ref 0.2–1.2)
BUN SERPL-MCNC: 68 MG/DL — HIGH (ref 7–23)
BUN SERPL-MCNC: 68 MG/DL — HIGH (ref 7–23)
CALCIUM SERPL-MCNC: 8.1 MG/DL — LOW (ref 8.4–10.5)
CALCIUM SERPL-MCNC: 8.1 MG/DL — LOW (ref 8.4–10.5)
CHLORIDE SERPL-SCNC: 94 MMOL/L — LOW (ref 96–108)
CHLORIDE SERPL-SCNC: 94 MMOL/L — LOW (ref 96–108)
CO2 SERPL-SCNC: 28 MMOL/L — SIGNIFICANT CHANGE UP (ref 22–31)
CO2 SERPL-SCNC: 28 MMOL/L — SIGNIFICANT CHANGE UP (ref 22–31)
CREAT SERPL-MCNC: 8.92 MG/DL — HIGH (ref 0.5–1.3)
CREAT SERPL-MCNC: 8.92 MG/DL — HIGH (ref 0.5–1.3)
EGFR: 4 ML/MIN/1.73M2 — LOW
EGFR: 4 ML/MIN/1.73M2 — LOW
GLUCOSE BLDC GLUCOMTR-MCNC: 129 MG/DL — HIGH (ref 70–99)
GLUCOSE BLDC GLUCOMTR-MCNC: 129 MG/DL — HIGH (ref 70–99)
GLUCOSE BLDC GLUCOMTR-MCNC: 132 MG/DL — HIGH (ref 70–99)
GLUCOSE BLDC GLUCOMTR-MCNC: 132 MG/DL — HIGH (ref 70–99)
GLUCOSE BLDC GLUCOMTR-MCNC: 212 MG/DL — HIGH (ref 70–99)
GLUCOSE BLDC GLUCOMTR-MCNC: 212 MG/DL — HIGH (ref 70–99)
GLUCOSE BLDC GLUCOMTR-MCNC: 239 MG/DL — HIGH (ref 70–99)
GLUCOSE BLDC GLUCOMTR-MCNC: 239 MG/DL — HIGH (ref 70–99)
GLUCOSE SERPL-MCNC: 130 MG/DL — HIGH (ref 70–99)
GLUCOSE SERPL-MCNC: 130 MG/DL — HIGH (ref 70–99)
HCT VFR BLD CALC: 33.6 % — LOW (ref 34.5–45)
HCT VFR BLD CALC: 33.6 % — LOW (ref 34.5–45)
HGB BLD-MCNC: 10 G/DL — LOW (ref 11.5–15.5)
HGB BLD-MCNC: 10 G/DL — LOW (ref 11.5–15.5)
MAGNESIUM SERPL-MCNC: 2.3 MG/DL — SIGNIFICANT CHANGE UP (ref 1.6–2.6)
MAGNESIUM SERPL-MCNC: 2.3 MG/DL — SIGNIFICANT CHANGE UP (ref 1.6–2.6)
MCHC RBC-ENTMCNC: 29.2 PG — SIGNIFICANT CHANGE UP (ref 27–34)
MCHC RBC-ENTMCNC: 29.2 PG — SIGNIFICANT CHANGE UP (ref 27–34)
MCHC RBC-ENTMCNC: 29.8 GM/DL — LOW (ref 32–36)
MCHC RBC-ENTMCNC: 29.8 GM/DL — LOW (ref 32–36)
MCV RBC AUTO: 98.2 FL — SIGNIFICANT CHANGE UP (ref 80–100)
MCV RBC AUTO: 98.2 FL — SIGNIFICANT CHANGE UP (ref 80–100)
NRBC # BLD: 0 /100 WBCS — SIGNIFICANT CHANGE UP (ref 0–0)
NRBC # BLD: 0 /100 WBCS — SIGNIFICANT CHANGE UP (ref 0–0)
PHOSPHATE SERPL-MCNC: 5.6 MG/DL — HIGH (ref 2.5–4.5)
PHOSPHATE SERPL-MCNC: 5.6 MG/DL — HIGH (ref 2.5–4.5)
PLATELET # BLD AUTO: 228 K/UL — SIGNIFICANT CHANGE UP (ref 150–400)
PLATELET # BLD AUTO: 228 K/UL — SIGNIFICANT CHANGE UP (ref 150–400)
POTASSIUM SERPL-MCNC: 4.3 MMOL/L — SIGNIFICANT CHANGE UP (ref 3.5–5.3)
POTASSIUM SERPL-MCNC: 4.3 MMOL/L — SIGNIFICANT CHANGE UP (ref 3.5–5.3)
POTASSIUM SERPL-SCNC: 4.3 MMOL/L — SIGNIFICANT CHANGE UP (ref 3.5–5.3)
POTASSIUM SERPL-SCNC: 4.3 MMOL/L — SIGNIFICANT CHANGE UP (ref 3.5–5.3)
PROT SERPL-MCNC: 6.2 G/DL — SIGNIFICANT CHANGE UP (ref 6–8.3)
PROT SERPL-MCNC: 6.2 G/DL — SIGNIFICANT CHANGE UP (ref 6–8.3)
RBC # BLD: 3.42 M/UL — LOW (ref 3.8–5.2)
RBC # BLD: 3.42 M/UL — LOW (ref 3.8–5.2)
RBC # FLD: 16.4 % — HIGH (ref 10.3–14.5)
RBC # FLD: 16.4 % — HIGH (ref 10.3–14.5)
SODIUM SERPL-SCNC: 137 MMOL/L — SIGNIFICANT CHANGE UP (ref 135–145)
SODIUM SERPL-SCNC: 137 MMOL/L — SIGNIFICANT CHANGE UP (ref 135–145)
WBC # BLD: 5.28 K/UL — SIGNIFICANT CHANGE UP (ref 3.8–10.5)
WBC # BLD: 5.28 K/UL — SIGNIFICANT CHANGE UP (ref 3.8–10.5)
WBC # FLD AUTO: 5.28 K/UL — SIGNIFICANT CHANGE UP (ref 3.8–10.5)
WBC # FLD AUTO: 5.28 K/UL — SIGNIFICANT CHANGE UP (ref 3.8–10.5)

## 2023-10-17 PROCEDURE — 99233 SBSQ HOSP IP/OBS HIGH 50: CPT

## 2023-10-17 PROCEDURE — 99232 SBSQ HOSP IP/OBS MODERATE 35: CPT

## 2023-10-17 RX ORDER — HEPARIN SODIUM 5000 [USP'U]/ML
5000 INJECTION INTRAVENOUS; SUBCUTANEOUS EVERY 8 HOURS
Refills: 0 | Status: DISCONTINUED | OUTPATIENT
Start: 2023-10-17 | End: 2023-10-22

## 2023-10-17 RX ADMIN — CINACALCET 60 MILLIGRAM(S): 30 TABLET, FILM COATED ORAL at 11:06

## 2023-10-17 RX ADMIN — CLOPIDOGREL BISULFATE 75 MILLIGRAM(S): 75 TABLET, FILM COATED ORAL at 11:07

## 2023-10-17 RX ADMIN — REMDESIVIR 200 MILLIGRAM(S): 5 INJECTION INTRAVENOUS at 14:14

## 2023-10-17 RX ADMIN — CARVEDILOL PHOSPHATE 12.5 MILLIGRAM(S): 80 CAPSULE, EXTENDED RELEASE ORAL at 11:06

## 2023-10-17 RX ADMIN — MONTELUKAST 10 MILLIGRAM(S): 4 TABLET, CHEWABLE ORAL at 11:06

## 2023-10-17 RX ADMIN — HEPARIN SODIUM 5000 UNIT(S): 5000 INJECTION INTRAVENOUS; SUBCUTANEOUS at 23:02

## 2023-10-17 RX ADMIN — Medication 81 MILLIGRAM(S): at 11:07

## 2023-10-17 RX ADMIN — PANTOPRAZOLE SODIUM 40 MILLIGRAM(S): 20 TABLET, DELAYED RELEASE ORAL at 06:10

## 2023-10-17 RX ADMIN — ATORVASTATIN CALCIUM 80 MILLIGRAM(S): 80 TABLET, FILM COATED ORAL at 23:02

## 2023-10-17 RX ADMIN — CARVEDILOL PHOSPHATE 12.5 MILLIGRAM(S): 80 CAPSULE, EXTENDED RELEASE ORAL at 23:02

## 2023-10-17 RX ADMIN — AMLODIPINE BESYLATE 10 MILLIGRAM(S): 2.5 TABLET ORAL at 06:10

## 2023-10-17 RX ADMIN — INSULIN GLARGINE 5 UNIT(S): 100 INJECTION, SOLUTION SUBCUTANEOUS at 23:01

## 2023-10-17 RX ADMIN — Medication 100 MILLIGRAM(S): at 16:25

## 2023-10-17 RX ADMIN — SIMETHICONE 80 MILLIGRAM(S): 80 TABLET, CHEWABLE ORAL at 17:37

## 2023-10-17 RX ADMIN — HEPARIN SODIUM 5000 UNIT(S): 5000 INJECTION INTRAVENOUS; SUBCUTANEOUS at 11:06

## 2023-10-17 RX ADMIN — Medication 2: at 11:05

## 2023-10-17 RX ADMIN — SIMETHICONE 80 MILLIGRAM(S): 80 TABLET, CHEWABLE ORAL at 06:10

## 2023-10-17 NOTE — PROGRESS NOTE ADULT - ASSESSMENT
This is a 73F with PMHX of CAD s/p PCI (2017) and recent T1 NSTEMI (s/p recent cardiac cath), ESRD on HD (via LUE AVF), HTN, T2DM, gout, fibroids who presents with a chief complaint of NSTEMI. Medicine consulted for COVID.     #COVID-19 - IMPROVING  PCR+ on 10/12, reports sore throat. Afebrile. WBC WNL. On RA 96%. No reported history of asthma/copd. Vaccinated with "multiple boosters" per patient (J&J).  Unable to receive paxlovid given CrCl <30. Does not qualify for dexamethasone given not on mechanical ventilation.   - c/w Remdesivir x5 day course (ending 10/17); discussed with cardiology PA and renal that remdesivir can be given in HD  - Supportive care:  - Standing tylenol 650mg q6hrs for fever  - C/w Robitussin-DM every 8 hours  - C/w Montelukast 10mg qd  - Cepacol lozenges for sore throat  - DVT PPX while inpatient    #T2DM (A1c 5.1% 10/2023)  - C/w lantus 5u qhs  - AM   - FS q6hrs  - C/w mISS    Med consult will continue to follow.  Thank you for this consult. Please page [phone] for any questions.  Recommendations not final until attestation signed by attending.

## 2023-10-17 NOTE — PROGRESS NOTE ADULT - SUBJECTIVE AND OBJECTIVE BOX
INTERVAL EVENTS: PREMA    SUBJECTIVE / INTERVAL HPI: Patient seen and examined at bedside.     ROS: negative unless otherwise stated above.    VITAL SIGNS:  Vital Signs Last 24 Hrs  T(C): 36.7 (17 Oct 2023 04:34), Max: 37.3 (16 Oct 2023 21:47)  T(F): 98 (17 Oct 2023 04:34), Max: 99.1 (16 Oct 2023 21:47)  HR: 74 (17 Oct 2023 04:34) (74 - 90)  BP: 177/76 (17 Oct 2023 04:34) (117/55 - 177/76)  BP(mean): 97 (16 Oct 2023 16:11) (79 - 97)  RR: 18 (17 Oct 2023 04:34) (18 - 19)  SpO2: 98% (17 Oct 2023 04:34) (93% - 98%)    Parameters below as of 17 Oct 2023 04:34  Patient On (Oxygen Delivery Method): room air          10-16-23 @ 07:01  -  10-17-23 @ 07:00  --------------------------------------------------------  IN: 480 mL / OUT: 500 mL / NET: -20 mL        PHYSICAL EXAM:    Constitutional: NAD, comfortable in bed. Tired appearing.  HEENT: NC/AT, PERRLA, EOMI, no conjunctival pallor or scleral icterus, MMM  Neck: Supple, no JVD  Respiratory: CTA B/L. No w/r/r.   Cardiovascular: RRR, normal S1 and S2, no m/r/g.   Gastrointestinal: +BS, soft NTND, no guarding or rebound tenderness, no palpable masses   Extremities: wwp; no cyanosis, clubbing or edema.   Vascular: LUE AVF. Pulses equal and strong throughout.   Neurological: AAOx3, no CN deficits, strength and sensation intact throughout.   Skin: No gross skin abnormalities or rashes    MEDICATIONS:  MEDICATIONS  (STANDING):  amLODIPine   Tablet 10 milliGRAM(s) Oral daily  aspirin enteric coated 81 milliGRAM(s) Oral daily  atorvastatin 80 milliGRAM(s) Oral at bedtime  carvedilol 12.5 milliGRAM(s) Oral every 12 hours  cinacalcet 60 milliGRAM(s) Oral daily  clopidogrel Tablet 75 milliGRAM(s) Oral daily  dextrose 5%. 1000 milliLiter(s) (50 mL/Hr) IV Continuous <Continuous>  dextrose 5%. 1000 milliLiter(s) (100 mL/Hr) IV Continuous <Continuous>  dextrose 50% Injectable 25 Gram(s) IV Push once  dextrose 50% Injectable 25 Gram(s) IV Push once  dextrose 50% Injectable 12.5 Gram(s) IV Push once  gabapentin 100 milliGRAM(s) Oral <User Schedule>  glucagon  Injectable 1 milliGRAM(s) IntraMuscular once  insulin glargine Injectable (LANTUS) 5 Unit(s) SubCutaneous at bedtime  insulin lispro (ADMELOG) corrective regimen sliding scale   SubCutaneous three times a day before meals  insulin lispro (ADMELOG) corrective regimen sliding scale   SubCutaneous at bedtime  montelukast 10 milliGRAM(s) Oral daily  nitroglycerin     SubLingual 0.4 milliGRAM(s) SubLingual once  pantoprazole    Tablet 40 milliGRAM(s) Oral before breakfast  polyethylene glycol 3350 17 Gram(s) Oral daily  remdesivir  IVPB 100 milliGRAM(s) IV Intermittent every 24 hours  remdesivir  IVPB   IV Intermittent   senna 1 Tablet(s) Oral daily  simethicone 80 milliGRAM(s) Chew two times a day    MEDICATIONS  (PRN):  acetaminophen     Tablet .. 650 milliGRAM(s) Oral every 6 hours PRN Temp greater or equal to 38C (100.4F), Mild Pain (1 - 3), Moderate Pain (4 - 6)  benzocaine/menthol Lozenge 1 Lozenge Oral two times a day PRN Sore Throat  dextrose Oral Gel 15 Gram(s) Oral once PRN Blood Glucose LESS THAN 70 milliGRAM(s)/deciliter  guaiFENesin Oral Liquid (Sugar-Free) 100 milliGRAM(s) Oral every 6 hours PRN Cough      ALLERGIES:  Allergies    No Known Allergies    Intolerances        LABS:                        10.0   5.28  )-----------( 228      ( 17 Oct 2023 05:30 )             33.6     10-16    135  |  89<L>  |  107<H>  ----------------------------<  124<H>  5.5<H>   |  20<L>  |  13.29<H>    Ca    7.6<L>      16 Oct 2023 05:30  Mg     2.4     10-16        Urinalysis Basic - ( 16 Oct 2023 05:30 )    Color: x / Appearance: x / SG: x / pH: x  Gluc: 124 mg/dL / Ketone: x  / Bili: x / Urobili: x   Blood: x / Protein: x / Nitrite: x   Leuk Esterase: x / RBC: x / WBC x   Sq Epi: x / Non Sq Epi: x / Bacteria: x      CAPILLARY BLOOD GLUCOSE      POCT Blood Glucose.: 129 mg/dL (17 Oct 2023 07:54)      RADIOLOGY & ADDITIONAL TESTS: Reviewed.

## 2023-10-17 NOTE — PROGRESS NOTE ADULT - ASSESSMENT
73F with hx of HTN, HLD, DM2, 3vCAD with mLAD 75%, pLCx 70%, and tortuous pRCA 90%, and ESRD on dialysis MWF presenting as a transfer from [facility] for NSTEMI and possible staged PCI. Now s/p LHC with p-mRCA 90% ISR s/p Rota/JAMIL x1 and residual LAD 75%. Plan for Staged PCI for residual LAD lesion in 5 weeks. Course complicated by COVID infection (day 0 10/12/2023); now pending shelter and dialysis acceptance.

## 2023-10-17 NOTE — PROGRESS NOTE ADULT - SUBJECTIVE AND OBJECTIVE BOX
Interventional Cardiology PA Adult Progress Note    Subjective Assessment: Patient seen and examined at bedside.   	  MEDICATIONS:  amLODIPine   Tablet 10 milliGRAM(s) Oral daily  carvedilol 12.5 milliGRAM(s) Oral every 12 hours  nitroglycerin     SubLingual 0.4 milliGRAM(s) SubLingual once    remdesivir  IVPB   IV Intermittent   remdesivir  IVPB 100 milliGRAM(s) IV Intermittent every 24 hours    guaiFENesin Oral Liquid (Sugar-Free) 100 milliGRAM(s) Oral every 6 hours PRN  montelukast 10 milliGRAM(s) Oral daily    acetaminophen     Tablet .. 650 milliGRAM(s) Oral every 6 hours PRN  gabapentin 100 milliGRAM(s) Oral <User Schedule>    pantoprazole    Tablet 40 milliGRAM(s) Oral before breakfast  polyethylene glycol 3350 17 Gram(s) Oral daily  senna 1 Tablet(s) Oral daily  simethicone 80 milliGRAM(s) Chew two times a day    atorvastatin 80 milliGRAM(s) Oral at bedtime  cinacalcet 60 milliGRAM(s) Oral daily  dextrose 50% Injectable 25 Gram(s) IV Push once  dextrose 50% Injectable 25 Gram(s) IV Push once  dextrose 50% Injectable 12.5 Gram(s) IV Push once  dextrose Oral Gel 15 Gram(s) Oral once PRN  glucagon  Injectable 1 milliGRAM(s) IntraMuscular once  insulin glargine Injectable (LANTUS) 5 Unit(s) SubCutaneous at bedtime  insulin lispro (ADMELOG) corrective regimen sliding scale   SubCutaneous at bedtime  insulin lispro (ADMELOG) corrective regimen sliding scale   SubCutaneous three times a day before meals    aspirin enteric coated 81 milliGRAM(s) Oral daily  benzocaine/menthol Lozenge 1 Lozenge Oral two times a day PRN  clopidogrel Tablet 75 milliGRAM(s) Oral daily  dextrose 5%. 1000 milliLiter(s) IV Continuous <Continuous>  dextrose 5%. 1000 milliLiter(s) IV Continuous <Continuous>  heparin   Injectable 5000 Unit(s) SubCutaneous every 8 hours        [PHYSICAL EXAM:  TELEMETRY:  T(C): 36.7 (10-17-23 @ 04:34), Max: 37.3 (10-16-23 @ 21:47)  HR: 74 (10-17-23 @ 04:34) (74 - 90)  BP: 177/76 (10-17-23 @ 04:34) (117/55 - 177/76)  RR: 18 (10-17-23 @ 04:34) (18 - 19)  SpO2: 98% (10-17-23 @ 04:34) (93% - 98%)  Wt(kg): --  I&O's Summary    16 Oct 2023 07:01  -  17 Oct 2023 07:00  --------------------------------------------------------  IN: 480 mL / OUT: 500 mL / NET: -20 mL        	    LABS:	 	  CARDIAC MARKERS:                        10.0   5.28  )-----------( 228      ( 17 Oct 2023 05:30 )             33.6     10-17    137  |  94<L>  |  68<H>  ----------------------------<  130<H>  4.3   |  28  |  8.92<H>    Ca    8.1<L>      17 Oct 2023 05:30  Phos  5.6     10-17  Mg     2.3     10-17    TPro  6.2  /  Alb  3.1<L>  /  TBili  0.2  /  DBili  x   /  AST  19  /  ALT  11  /  AlkPhos  111  10-17    proBNP:   Lipid Profile:   HgA1c:   TSH:        Interventional Cardiology PA Adult Progress Note    Subjective Assessment: Patient seen and examined at bedside. Pt in chair and reports she is feeling better today. Denies chest pain, SOB, n/v/d, F/C.   	  MEDICATIONS:  amLODIPine   Tablet 10 milliGRAM(s) Oral daily  carvedilol 12.5 milliGRAM(s) Oral every 12 hours  nitroglycerin     SubLingual 0.4 milliGRAM(s) SubLingual once    remdesivir  IVPB   IV Intermittent   remdesivir  IVPB 100 milliGRAM(s) IV Intermittent every 24 hours    guaiFENesin Oral Liquid (Sugar-Free) 100 milliGRAM(s) Oral every 6 hours PRN  montelukast 10 milliGRAM(s) Oral daily    acetaminophen     Tablet .. 650 milliGRAM(s) Oral every 6 hours PRN  gabapentin 100 milliGRAM(s) Oral <User Schedule>    pantoprazole    Tablet 40 milliGRAM(s) Oral before breakfast  polyethylene glycol 3350 17 Gram(s) Oral daily  senna 1 Tablet(s) Oral daily  simethicone 80 milliGRAM(s) Chew two times a day    atorvastatin 80 milliGRAM(s) Oral at bedtime  cinacalcet 60 milliGRAM(s) Oral daily  dextrose 50% Injectable 25 Gram(s) IV Push once  dextrose 50% Injectable 25 Gram(s) IV Push once  dextrose 50% Injectable 12.5 Gram(s) IV Push once  dextrose Oral Gel 15 Gram(s) Oral once PRN  glucagon  Injectable 1 milliGRAM(s) IntraMuscular once  insulin glargine Injectable (LANTUS) 5 Unit(s) SubCutaneous at bedtime  insulin lispro (ADMELOG) corrective regimen sliding scale   SubCutaneous at bedtime  insulin lispro (ADMELOG) corrective regimen sliding scale   SubCutaneous three times a day before meals    aspirin enteric coated 81 milliGRAM(s) Oral daily  benzocaine/menthol Lozenge 1 Lozenge Oral two times a day PRN  clopidogrel Tablet 75 milliGRAM(s) Oral daily  dextrose 5%. 1000 milliLiter(s) IV Continuous <Continuous>  dextrose 5%. 1000 milliLiter(s) IV Continuous <Continuous>  heparin   Injectable 5000 Unit(s) SubCutaneous every 8 hours        [PHYSICAL EXAM:  TELEMETRY:  T(C): 36.7 (10-17-23 @ 04:34), Max: 37.3 (10-16-23 @ 21:47)  HR: 74 (10-17-23 @ 04:34) (74 - 90)  BP: 177/76 (10-17-23 @ 04:34) (117/55 - 177/76)  RR: 18 (10-17-23 @ 04:34) (18 - 19)  SpO2: 98% (10-17-23 @ 04:34) (93% - 98%)  Wt(kg): --  I&O's Summary    16 Oct 2023 07:01  -  17 Oct 2023 07:00  --------------------------------------------------------  IN: 480 mL / OUT: 500 mL / NET: -20 mL      PHYSICAL EXAM:  Appearance: in NAD	  HEENT: moist oral mucosa, + poor dentition and missing teeth; PERRL, EOMI	  Neck: Supple, - JVD; no carotid Bruit   Cardiovascular: Normal S1 S2, soft sys 2/6 murmur  Respiratory: Lungs clear to auscultation, no Rales, Rhonchi, Wheezing	  Gastrointestinal:  Soft, Non-tender, + BS	  Skin: No rashes, No ecchymoses, No cyanosis  Extremities: Normal range of motion, No clubbing, cyanosis or edema  Vascular: Peripheral pulses palpable 2+ bilaterally  Neurologic: Non-focal  Psychiatry: A & O x 3, Mood & affect appropriate, improved mood today  	    LABS:	 	  CARDIAC MARKERS:                        10.0   5.28  )-----------( 228      ( 17 Oct 2023 05:30 )             33.6     10-17    137  |  94<L>  |  68<H>  ----------------------------<  130<H>  4.3   |  28  |  8.92<H>    Ca    8.1<L>      17 Oct 2023 05:30  Phos  5.6     10-17  Mg     2.3     10-17    TPro  6.2  /  Alb  3.1<L>  /  TBili  0.2  /  DBili  x   /  AST  19  /  ALT  11  /  AlkPhos  111  10-17    proBNP:   Lipid Profile:   HgA1c:   TSH:        Interventional Cardiology PA Adult Progress Note    Subjective Assessment: Patient seen and examined at bedside. Pt in chair and reports she is overall feeling better today. Endorses mild stomach upset with breakfast but no N/V/D, stomach irritation self resolved and none since. Denies chest pain, SOB,  F/C.   	  MEDICATIONS:  amLODIPine   Tablet 10 milliGRAM(s) Oral daily  carvedilol 12.5 milliGRAM(s) Oral every 12 hours  nitroglycerin     SubLingual 0.4 milliGRAM(s) SubLingual once    remdesivir  IVPB   IV Intermittent   remdesivir  IVPB 100 milliGRAM(s) IV Intermittent every 24 hours    guaiFENesin Oral Liquid (Sugar-Free) 100 milliGRAM(s) Oral every 6 hours PRN  montelukast 10 milliGRAM(s) Oral daily    acetaminophen     Tablet .. 650 milliGRAM(s) Oral every 6 hours PRN  gabapentin 100 milliGRAM(s) Oral <User Schedule>    pantoprazole    Tablet 40 milliGRAM(s) Oral before breakfast  polyethylene glycol 3350 17 Gram(s) Oral daily  senna 1 Tablet(s) Oral daily  simethicone 80 milliGRAM(s) Chew two times a day    atorvastatin 80 milliGRAM(s) Oral at bedtime  cinacalcet 60 milliGRAM(s) Oral daily  dextrose 50% Injectable 25 Gram(s) IV Push once  dextrose 50% Injectable 25 Gram(s) IV Push once  dextrose 50% Injectable 12.5 Gram(s) IV Push once  dextrose Oral Gel 15 Gram(s) Oral once PRN  glucagon  Injectable 1 milliGRAM(s) IntraMuscular once  insulin glargine Injectable (LANTUS) 5 Unit(s) SubCutaneous at bedtime  insulin lispro (ADMELOG) corrective regimen sliding scale   SubCutaneous at bedtime  insulin lispro (ADMELOG) corrective regimen sliding scale   SubCutaneous three times a day before meals    aspirin enteric coated 81 milliGRAM(s) Oral daily  benzocaine/menthol Lozenge 1 Lozenge Oral two times a day PRN  clopidogrel Tablet 75 milliGRAM(s) Oral daily  dextrose 5%. 1000 milliLiter(s) IV Continuous <Continuous>  dextrose 5%. 1000 milliLiter(s) IV Continuous <Continuous>  heparin   Injectable 5000 Unit(s) SubCutaneous every 8 hours        [PHYSICAL EXAM:  TELEMETRY:  T(C): 36.7 (10-17-23 @ 04:34), Max: 37.3 (10-16-23 @ 21:47)  HR: 74 (10-17-23 @ 04:34) (74 - 90)  BP: 177/76 (10-17-23 @ 04:34) (117/55 - 177/76)  RR: 18 (10-17-23 @ 04:34) (18 - 19)  SpO2: 98% (10-17-23 @ 04:34) (93% - 98%)  Wt(kg): --  I&O's Summary    16 Oct 2023 07:01  -  17 Oct 2023 07:00  --------------------------------------------------------  IN: 480 mL / OUT: 500 mL / NET: -20 mL      PHYSICAL EXAM:  Appearance: in NAD in bedside chair   HEENT: moist oral mucosa, + poor dentition and missing teeth; PERRL, EOMI	  Neck: Supple, - JVD; no carotid Bruit   Cardiovascular: Normal S1 S2, soft sys 2/6 murmur  Respiratory: Lungs clear to auscultation, no Rales, Rhonchi, Wheezing	  Gastrointestinal:  Soft, Non-tender, + BS	  Skin: No rashes, No ecchymoses, No cyanosis  Extremities: Normal range of motion, No clubbing, cyanosis or edema  Vascular: Peripheral pulses palpable 2+ bilaterally  Neurologic: Non-focal  Psychiatry: A & O x 3, Mood & affect appropriate, improved mood today  	    LABS:	 	  CARDIAC MARKERS:                        10.0   5.28  )-----------( 228      ( 17 Oct 2023 05:30 )             33.6     10-17    137  |  94<L>  |  68<H>  ----------------------------<  130<H>  4.3   |  28  |  8.92<H>    Ca    8.1<L>      17 Oct 2023 05:30  Phos  5.6     10-17  Mg     2.3     10-17    TPro  6.2  /  Alb  3.1<L>  /  TBili  0.2  /  DBili  x   /  AST  19  /  ALT  11  /  AlkPhos  111  10-17    proBNP:   Lipid Profile:   HgA1c:   TSH:

## 2023-10-17 NOTE — PROGRESS NOTE ADULT - PROBLEM SELECTOR PLAN 7
- Fluids: not indicated   - Replete Lytes to K>4, Mg>2 PRN   - Diet: DASH   - GI ppx: PPI   - PT Eval: home PT  - Dispo/SW: Awaiting shelter placement w/dialysis acceptance; course delayed at present due to no accepting facilities i/s/o Covid+.  SW continuing to evaluate.   - CODE: FULL code, see [facility] 10/17/23    Case discussed w/ Dr Alatorre

## 2023-10-17 NOTE — PROGRESS NOTE ADULT - PROBLEM SELECTOR PLAN 2
- HD via LUE AVF ([facility]). s/p HD 10/16, next plan for 10/18/23  - Continue Cinacalcet 60mg QD   - Renal following, appreciate recs

## 2023-10-17 NOTE — PROGRESS NOTE ADULT - PROBLEM SELECTOR PLAN 1
- DAPT: ASA 81mg + Plavix 75mg   - Continue Atorvastatin 80mg QHS   - [facility] ([facility] 3vCAD with mLAD 75%, pLCx 70%, and tortous pRCA 90%; s/p Heparin gtt for ACS coverage   - [facility] (10/10/23): Rota/JAMIL x1 to p-mRCA (90% ISR), LAD 75%. Plan for Staged PCI of residual LAD lesion in 5 weeks   - TTE (10/01/23): LV EF 60-65%, LVH, normal wall motion. G1DD. Trace MR. Aortic valve mildly calcified  - Patient experienced 1 episode of angina 10/13 while at rest, self resolved; EKG with TWI not seen previously on EKG from 10/11. Given known residual disease, will consider performing staged intervention during this admission if symptoms continue to occur--> No further chest pain reported

## 2023-10-17 NOTE — PROGRESS NOTE ADULT - NS ATTEND AMEND GEN_ALL_CORE FT
74 yo female, residing in shelter, with PMHx of ESRD on HD via left arm fistula (MWF), HTN, DM-II, CAD s/p PCI 2017, gout, and fibroids, admitted for NSTEMI.     1. CAD/NSTEMI type 1  S/p cardiac cath on 10/8/23 with Dr. Wilson revealing 3VCAD. LM minimal LI, severely calcified. mLAD 75% severly calcified, tortous. pLCx 70%, tortous. pRCA 90% at edge of previous stent, tortous.   Remains chest pain free.  Continue aspirin 81 mg and Plavix 75 mg. Atorvastatin 80 mg.    2. ESRD HD  HD tomorrow after PCI.    3. Hypertension  Continue amlodipine 10 mg.  Coreg 12.5 mg bid.    4. Anemia  Probably chronic disease, normal TSH, B12, Bilirubin,  and Folate.    5. COVID  S/p redemsivir, stable.    6. ? Depression  Psych consult     Pending shelter placement.

## 2023-10-17 NOTE — GOALS OF CARE CONVERSATION - ADVANCED CARE PLANNING - CONVERSATION DETAILS
LACE > 11, GOC discussed with patient.  Discussion included but not limited to Code status as best as possible.  Per Patient’s wishes She confirmed Patient to be FULL CODE.

## 2023-10-17 NOTE — PROGRESS NOTE ADULT - PROBLEM SELECTOR PLAN 3
- continue with amlodipine 10 mg QD; uptitrated coreg 6.25 mg BID to 12.5 mg BID.  - SBP ranging 111mmHg- 177mmHg today (177 in AM prior to medications); continue to monitor - continue with amlodipine 10 mg QD; uptitrated coreg 6.25 mg BID to 12.5 mg BID.  - SBP ranging 111mmHg- 177mmHg today (177 in AM prior to medications); continue to monitor; seems 111mmHg daytime reading reflects proper response to medications at this time.

## 2023-10-17 NOTE — PROGRESS NOTE ADULT - PROBLEM SELECTOR PLAN 4
- c/o sore throat and low grade temp on 10/12. Found to be COVID+ 10/12/23  - Continue Montelukast 10mg QD and supportive management  - s/p remdesivir taper- completed on 10/17/23    ##Flat Affect  - pt more upbeat and out of bed with PT on 10/17/23  [ ] Psych consult in AM 10/18 if continues to be an issue - c/o sore throat and low grade temp on 10/12. Found to be COVID+ 10/12/23  - Continue Montelukast 10mg QD and supportive management  - s/p remdesivir taper- completed on 10/17/23  - feeling well today    ##Flat Affect  - pt more upbeat and out of bed with PT on 10/17/23  [ ] Continue to monitor with plan for Psych consult on 0/18 if continues to be an issue - c/o sore throat and low grade temp on 10/12. Found to be COVID+ 10/12/23  - Continue Montelukast 10mg QD and supportive management  - s/p remdesivir taper- completed on 10/17/23  - feeling well today    ##Flat Affect  - pt more upbeat and out of bed with PT on 10/17/23  - Holistic RN ordered  [ ] Continue to monitor with plan for Psych consult on 0/18 if continues to be an issue

## 2023-10-18 LAB
ALBUMIN SERPL ELPH-MCNC: 2.9 G/DL — LOW (ref 3.3–5)
ALBUMIN SERPL ELPH-MCNC: 2.9 G/DL — LOW (ref 3.3–5)
ALP SERPL-CCNC: 122 U/L — HIGH (ref 40–120)
ALP SERPL-CCNC: 122 U/L — HIGH (ref 40–120)
ALT FLD-CCNC: 12 U/L — SIGNIFICANT CHANGE UP (ref 10–45)
ALT FLD-CCNC: 12 U/L — SIGNIFICANT CHANGE UP (ref 10–45)
ANION GAP SERPL CALC-SCNC: 21 MMOL/L — HIGH (ref 5–17)
ANION GAP SERPL CALC-SCNC: 21 MMOL/L — HIGH (ref 5–17)
AST SERPL-CCNC: 19 U/L — SIGNIFICANT CHANGE UP (ref 10–40)
AST SERPL-CCNC: 19 U/L — SIGNIFICANT CHANGE UP (ref 10–40)
BILIRUB SERPL-MCNC: 0.2 MG/DL — SIGNIFICANT CHANGE UP (ref 0.2–1.2)
BILIRUB SERPL-MCNC: 0.2 MG/DL — SIGNIFICANT CHANGE UP (ref 0.2–1.2)
BUN SERPL-MCNC: 90 MG/DL — HIGH (ref 7–23)
BUN SERPL-MCNC: 90 MG/DL — HIGH (ref 7–23)
CALCIUM SERPL-MCNC: 8.1 MG/DL — LOW (ref 8.4–10.5)
CALCIUM SERPL-MCNC: 8.1 MG/DL — LOW (ref 8.4–10.5)
CHLORIDE SERPL-SCNC: 96 MMOL/L — SIGNIFICANT CHANGE UP (ref 96–108)
CHLORIDE SERPL-SCNC: 96 MMOL/L — SIGNIFICANT CHANGE UP (ref 96–108)
CO2 SERPL-SCNC: 23 MMOL/L — SIGNIFICANT CHANGE UP (ref 22–31)
CO2 SERPL-SCNC: 23 MMOL/L — SIGNIFICANT CHANGE UP (ref 22–31)
CREAT SERPL-MCNC: 11.19 MG/DL — HIGH (ref 0.5–1.3)
CREAT SERPL-MCNC: 11.19 MG/DL — HIGH (ref 0.5–1.3)
EGFR: 3 ML/MIN/1.73M2 — LOW
EGFR: 3 ML/MIN/1.73M2 — LOW
GLUCOSE BLDC GLUCOMTR-MCNC: 105 MG/DL — HIGH (ref 70–99)
GLUCOSE BLDC GLUCOMTR-MCNC: 105 MG/DL — HIGH (ref 70–99)
GLUCOSE BLDC GLUCOMTR-MCNC: 121 MG/DL — HIGH (ref 70–99)
GLUCOSE BLDC GLUCOMTR-MCNC: 121 MG/DL — HIGH (ref 70–99)
GLUCOSE BLDC GLUCOMTR-MCNC: 125 MG/DL — HIGH (ref 70–99)
GLUCOSE BLDC GLUCOMTR-MCNC: 125 MG/DL — HIGH (ref 70–99)
GLUCOSE BLDC GLUCOMTR-MCNC: 157 MG/DL — HIGH (ref 70–99)
GLUCOSE BLDC GLUCOMTR-MCNC: 157 MG/DL — HIGH (ref 70–99)
GLUCOSE SERPL-MCNC: 125 MG/DL — HIGH (ref 70–99)
GLUCOSE SERPL-MCNC: 125 MG/DL — HIGH (ref 70–99)
HCT VFR BLD CALC: 29.3 % — LOW (ref 34.5–45)
HCT VFR BLD CALC: 29.3 % — LOW (ref 34.5–45)
HGB BLD-MCNC: 8.9 G/DL — LOW (ref 11.5–15.5)
HGB BLD-MCNC: 8.9 G/DL — LOW (ref 11.5–15.5)
MAGNESIUM SERPL-MCNC: 2.1 MG/DL — SIGNIFICANT CHANGE UP (ref 1.6–2.6)
MAGNESIUM SERPL-MCNC: 2.1 MG/DL — SIGNIFICANT CHANGE UP (ref 1.6–2.6)
MCHC RBC-ENTMCNC: 28.9 PG — SIGNIFICANT CHANGE UP (ref 27–34)
MCHC RBC-ENTMCNC: 28.9 PG — SIGNIFICANT CHANGE UP (ref 27–34)
MCHC RBC-ENTMCNC: 30.4 GM/DL — LOW (ref 32–36)
MCHC RBC-ENTMCNC: 30.4 GM/DL — LOW (ref 32–36)
MCV RBC AUTO: 95.1 FL — SIGNIFICANT CHANGE UP (ref 80–100)
MCV RBC AUTO: 95.1 FL — SIGNIFICANT CHANGE UP (ref 80–100)
NRBC # BLD: 0 /100 WBCS — SIGNIFICANT CHANGE UP (ref 0–0)
NRBC # BLD: 0 /100 WBCS — SIGNIFICANT CHANGE UP (ref 0–0)
PHOSPHATE SERPL-MCNC: 7.9 MG/DL — HIGH (ref 2.5–4.5)
PHOSPHATE SERPL-MCNC: 7.9 MG/DL — HIGH (ref 2.5–4.5)
PLATELET # BLD AUTO: 247 K/UL — SIGNIFICANT CHANGE UP (ref 150–400)
PLATELET # BLD AUTO: 247 K/UL — SIGNIFICANT CHANGE UP (ref 150–400)
POTASSIUM SERPL-MCNC: 4.8 MMOL/L — SIGNIFICANT CHANGE UP (ref 3.5–5.3)
POTASSIUM SERPL-MCNC: 4.8 MMOL/L — SIGNIFICANT CHANGE UP (ref 3.5–5.3)
POTASSIUM SERPL-SCNC: 4.8 MMOL/L — SIGNIFICANT CHANGE UP (ref 3.5–5.3)
POTASSIUM SERPL-SCNC: 4.8 MMOL/L — SIGNIFICANT CHANGE UP (ref 3.5–5.3)
PROT SERPL-MCNC: 5.8 G/DL — LOW (ref 6–8.3)
PROT SERPL-MCNC: 5.8 G/DL — LOW (ref 6–8.3)
RBC # BLD: 3.08 M/UL — LOW (ref 3.8–5.2)
RBC # BLD: 3.08 M/UL — LOW (ref 3.8–5.2)
RBC # FLD: 16.3 % — HIGH (ref 10.3–14.5)
RBC # FLD: 16.3 % — HIGH (ref 10.3–14.5)
SODIUM SERPL-SCNC: 140 MMOL/L — SIGNIFICANT CHANGE UP (ref 135–145)
SODIUM SERPL-SCNC: 140 MMOL/L — SIGNIFICANT CHANGE UP (ref 135–145)
WBC # BLD: 5.52 K/UL — SIGNIFICANT CHANGE UP (ref 3.8–10.5)
WBC # BLD: 5.52 K/UL — SIGNIFICANT CHANGE UP (ref 3.8–10.5)
WBC # FLD AUTO: 5.52 K/UL — SIGNIFICANT CHANGE UP (ref 3.8–10.5)
WBC # FLD AUTO: 5.52 K/UL — SIGNIFICANT CHANGE UP (ref 3.8–10.5)

## 2023-10-18 PROCEDURE — 99232 SBSQ HOSP IP/OBS MODERATE 35: CPT

## 2023-10-18 RX ORDER — ERYTHROPOIETIN 10000 [IU]/ML
8000 INJECTION, SOLUTION INTRAVENOUS; SUBCUTANEOUS ONCE
Refills: 0 | Status: COMPLETED | OUTPATIENT
Start: 2023-10-18 | End: 2023-10-18

## 2023-10-18 RX ADMIN — SENNA PLUS 1 TABLET(S): 8.6 TABLET ORAL at 13:13

## 2023-10-18 RX ADMIN — Medication 100 MILLIGRAM(S): at 16:24

## 2023-10-18 RX ADMIN — Medication 81 MILLIGRAM(S): at 13:11

## 2023-10-18 RX ADMIN — ERYTHROPOIETIN 8000 UNIT(S): 10000 INJECTION, SOLUTION INTRAVENOUS; SUBCUTANEOUS at 15:55

## 2023-10-18 RX ADMIN — CINACALCET 60 MILLIGRAM(S): 30 TABLET, FILM COATED ORAL at 13:15

## 2023-10-18 RX ADMIN — GABAPENTIN 100 MILLIGRAM(S): 400 CAPSULE ORAL at 22:33

## 2023-10-18 RX ADMIN — SIMETHICONE 80 MILLIGRAM(S): 80 TABLET, CHEWABLE ORAL at 05:24

## 2023-10-18 RX ADMIN — HEPARIN SODIUM 5000 UNIT(S): 5000 INJECTION INTRAVENOUS; SUBCUTANEOUS at 13:13

## 2023-10-18 RX ADMIN — HEPARIN SODIUM 5000 UNIT(S): 5000 INJECTION INTRAVENOUS; SUBCUTANEOUS at 05:25

## 2023-10-18 RX ADMIN — PANTOPRAZOLE SODIUM 40 MILLIGRAM(S): 20 TABLET, DELAYED RELEASE ORAL at 05:25

## 2023-10-18 RX ADMIN — ATORVASTATIN CALCIUM 80 MILLIGRAM(S): 80 TABLET, FILM COATED ORAL at 22:33

## 2023-10-18 RX ADMIN — AMLODIPINE BESYLATE 10 MILLIGRAM(S): 2.5 TABLET ORAL at 13:12

## 2023-10-18 RX ADMIN — Medication 100 MILLIGRAM(S): at 22:34

## 2023-10-18 RX ADMIN — MONTELUKAST 10 MILLIGRAM(S): 4 TABLET, CHEWABLE ORAL at 13:12

## 2023-10-18 RX ADMIN — INSULIN GLARGINE 5 UNIT(S): 100 INJECTION, SOLUTION SUBCUTANEOUS at 22:33

## 2023-10-18 RX ADMIN — SIMETHICONE 80 MILLIGRAM(S): 80 TABLET, CHEWABLE ORAL at 18:16

## 2023-10-18 RX ADMIN — HEPARIN SODIUM 5000 UNIT(S): 5000 INJECTION INTRAVENOUS; SUBCUTANEOUS at 22:40

## 2023-10-18 RX ADMIN — CARVEDILOL PHOSPHATE 12.5 MILLIGRAM(S): 80 CAPSULE, EXTENDED RELEASE ORAL at 22:33

## 2023-10-18 RX ADMIN — CLOPIDOGREL BISULFATE 75 MILLIGRAM(S): 75 TABLET, FILM COATED ORAL at 13:11

## 2023-10-18 RX ADMIN — CARVEDILOL PHOSPHATE 12.5 MILLIGRAM(S): 80 CAPSULE, EXTENDED RELEASE ORAL at 13:12

## 2023-10-18 NOTE — PROGRESS NOTE ADULT - SUBJECTIVE AND OBJECTIVE BOX
Patient is a 73y Female seen and evaluated at bedside. No acute distress, VSS, HD today as per schedule      Meds:    acetaminophen     Tablet .. 650 every 6 hours PRN  amLODIPine   Tablet 10 daily  aspirin enteric coated 81 daily  atorvastatin 80 at bedtime  benzocaine/menthol Lozenge 1 two times a day PRN  carvedilol 12.5 every 12 hours  cinacalcet 60 daily  clopidogrel Tablet 75 daily  dextrose 5%. 1000 <Continuous>  dextrose 5%. 1000 <Continuous>  dextrose 50% Injectable 25 once  dextrose 50% Injectable 12.5 once  dextrose 50% Injectable 25 once  dextrose Oral Gel 15 once PRN  epoetin gamaliel-epbx (RETACRIT) Injectable 8000 once  gabapentin 100 <User Schedule>  glucagon  Injectable 1 once  guaiFENesin Oral Liquid (Sugar-Free) 100 every 6 hours PRN  heparin   Injectable 5000 every 8 hours  insulin glargine Injectable (LANTUS) 5 at bedtime  insulin lispro (ADMELOG) corrective regimen sliding scale  at bedtime  insulin lispro (ADMELOG) corrective regimen sliding scale  three times a day before meals  montelukast 10 daily  nitroglycerin     SubLingual 0.4 once  pantoprazole    Tablet 40 before breakfast  polyethylene glycol 3350 17 daily  senna 1 daily  simethicone 80 two times a day      T(C): , Max: 36.7 (10-17-23 @ 17:30)  T(F): , Max: 98 (10-17-23 @ 17:30)  HR: 66 (10-18-23 @ 15:30)  BP: 133/63 (10-18-23 @ 15:30)  BP(mean): 104 (10-18-23 @ 13:07)  RR: 18 (10-18-23 @ 15:30)  SpO2: 95% (10-18-23 @ 15:30)  Wt(kg): --    10-17 @ 07:01  -  10-18 @ 07:00  --------------------------------------------------------  IN: 360 mL / OUT: 0 mL / NET: 360 mL    10-18 @ 07:01  -  10-18 @ 15:50  --------------------------------------------------------  IN: 360 mL / OUT: 0 mL / NET: 360 mL          Review of Systems:  ROS negative except as per HPI      PHYSICAL EXAM:  GENERAL: Alert, awake, NAD    CHEST/LUNG: Bilateral clear breath sounds  HEART: Regular rate and rhythm  ABDOMEN: Soft, nontender, non distended  EXTREMITIES: no pedal edema  Neurology: AAOx3, no focal neurological deficit  ACCESS: CHANELE AVF w/bruit and thrill       LABS:                        8.9    5.52  )-----------( 247      ( 18 Oct 2023 05:30 )             29.3     10-18    140  |  96  |  90<H>  ----------------------------<  125<H>  4.8   |  23  |  11.19<H>    Ca    8.1<L>      18 Oct 2023 05:30  Phos  7.9     10-18  Mg     2.1     10-18    TPro  5.8<L>  /  Alb  2.9<L>  /  TBili  0.2  /  DBili  x   /  AST  19  /  ALT  12  /  AlkPhos  122<H>  10-18        Urinalysis Basic - ( 18 Oct 2023 05:30 )    Color: x / Appearance: x / SG: x / pH: x  Gluc: 125 mg/dL / Ketone: x  / Bili: x / Urobili: x   Blood: x / Protein: x / Nitrite: x   Leuk Esterase: x / RBC: x / WBC x   Sq Epi: x / Non Sq Epi: x / Bacteria: x            RADIOLOGY & ADDITIONAL STUDIES:

## 2023-10-18 NOTE — PROGRESS NOTE ADULT - SUBJECTIVE AND OBJECTIVE BOX
Interventional Cardiology PA Adult Progress Note    C.C.:     Subjective Assessment:      ROS Negative except as per Subjective and HPI  	  MEDICATIONS:  amLODIPine   Tablet 10 milliGRAM(s) Oral daily  carvedilol 12.5 milliGRAM(s) Oral every 12 hours  nitroglycerin     SubLingual 0.4 milliGRAM(s) SubLingual once      guaiFENesin Oral Liquid (Sugar-Free) 100 milliGRAM(s) Oral every 6 hours PRN  montelukast 10 milliGRAM(s) Oral daily    acetaminophen     Tablet .. 650 milliGRAM(s) Oral every 6 hours PRN  gabapentin 100 milliGRAM(s) Oral <User Schedule>    pantoprazole    Tablet 40 milliGRAM(s) Oral before breakfast  polyethylene glycol 3350 17 Gram(s) Oral daily  senna 1 Tablet(s) Oral daily  simethicone 80 milliGRAM(s) Chew two times a day    atorvastatin 80 milliGRAM(s) Oral at bedtime  cinacalcet 60 milliGRAM(s) Oral daily  dextrose 50% Injectable 25 Gram(s) IV Push once  dextrose 50% Injectable 12.5 Gram(s) IV Push once  dextrose 50% Injectable 25 Gram(s) IV Push once  dextrose Oral Gel 15 Gram(s) Oral once PRN  glucagon  Injectable 1 milliGRAM(s) IntraMuscular once  insulin glargine Injectable (LANTUS) 5 Unit(s) SubCutaneous at bedtime  insulin lispro (ADMELOG) corrective regimen sliding scale   SubCutaneous three times a day before meals  insulin lispro (ADMELOG) corrective regimen sliding scale   SubCutaneous at bedtime    aspirin enteric coated 81 milliGRAM(s) Oral daily  benzocaine/menthol Lozenge 1 Lozenge Oral two times a day PRN  clopidogrel Tablet 75 milliGRAM(s) Oral daily  dextrose 5%. 1000 milliLiter(s) IV Continuous <Continuous>  dextrose 5%. 1000 milliLiter(s) IV Continuous <Continuous>  epoetin gamaliel-epbx (RETACRIT) Injectable 8000 Unit(s) IV Push once  heparin   Injectable 5000 Unit(s) SubCutaneous every 8 hours      	    [PHYSICAL EXAM:  TELEMETRY:  T(C): 36.2 (10-18-23 @ 13:07), Max: 36.7 (10-17-23 @ 17:30)  HR: 68 (10-18-23 @ 13:07) (68 - 79)  BP: 166/72 (10-18-23 @ 13:07) (135/64 - 166/72)  RR: 16 (10-18-23 @ 13:07) (16 - 18)  SpO2: 100% (10-18-23 @ 13:07) (95% - 100%)  Wt(kg): --  I&O's Summary    17 Oct 2023 07:01  -  18 Oct 2023 07:00  --------------------------------------------------------  IN: 360 mL / OUT: 0 mL / NET: 360 mL    18 Oct 2023 07:01  -  18 Oct 2023 15:33  --------------------------------------------------------  IN: 360 mL / OUT: 0 mL / NET: 360 mL        Turcios:  Central/PICC/Mid Line:                                         Appearance: Normal	  HEENT:   Normal oral mucosa, PERRL, EOMI	  Neck: Supple, + JVD/ - JVD; Carotid Bruit   Cardiovascular: Normal S1 S2, No JVD, No murmurs,   Respiratory: Lungs clear to auscultation/Decreased Breath Sounds/No Rales, Rhonchi, Wheezing	  Gastrointestinal:  Soft, Non-tender, + BS	  Skin: No rashes, No ecchymoses, No cyanosis  Extremities: Normal range of motion, No clubbing, cyanosis or edema  Vascular: Peripheral pulses palpable 2+ bilaterally  Neurologic: Non-focal  Psychiatry: A & O x 3, Mood & affect appropriate      	    ECG:  	  RADIOLOGY:   DIAGNOSTIC TESTING:  [ ] Echocardiogram:  [ ]  Catheterization:  [ ] Stress Test:    [ ] SLADE  OTHER: 	    LABS:	 	  CARDIAC MARKERS:                                  8.9    5.52  )-----------( 247      ( 18 Oct 2023 05:30 )             29.3     10-18    140  |  96  |  90<H>  ----------------------------<  125<H>  4.8   |  23  |  11.19<H>    Ca    8.1<L>      18 Oct 2023 05:30  Phos  7.9     10-18  Mg     2.1     10-18    TPro  5.8<L>  /  Alb  2.9<L>  /  TBili  0.2  /  DBili  x   /  AST  19  /  ALT  12  /  AlkPhos  122<H>  10-18    proBNP:   Lipid Profile:   HgA1c:   TSH:       ASSESSMENT/PLAN: 	        DVT ppx:  Dispo:     Interventional Cardiology PA Adult Progress Note    Subjective Assessment:  Patient seen and examined overnight. Patient denied chest pain, shortness of breath, abdominal pain, leg pain, leg swelling.    ROS Negative except as per Subjective and HPI  	  MEDICATIONS:  amLODIPine   Tablet 10 milliGRAM(s) Oral daily  carvedilol 12.5 milliGRAM(s) Oral every 12 hours  nitroglycerin     SubLingual 0.4 milliGRAM(s) SubLingual once  guaiFENesin Oral Liquid (Sugar-Free) 100 milliGRAM(s) Oral every 6 hours PRN  montelukast 10 milliGRAM(s) Oral daily  acetaminophen     Tablet .. 650 milliGRAM(s) Oral every 6 hours PRN  gabapentin 100 milliGRAM(s) Oral <User Schedule>  pantoprazole    Tablet 40 milliGRAM(s) Oral before breakfast  polyethylene glycol 3350 17 Gram(s) Oral daily  senna 1 Tablet(s) Oral daily  simethicone 80 milliGRAM(s) Chew two times a day  atorvastatin 80 milliGRAM(s) Oral at bedtime  cinacalcet 60 milliGRAM(s) Oral daily  dextrose 50% Injectable 25 Gram(s) IV Push once  dextrose 50% Injectable 12.5 Gram(s) IV Push once  dextrose 50% Injectable 25 Gram(s) IV Push once  dextrose Oral Gel 15 Gram(s) Oral once PRN  glucagon  Injectable 1 milliGRAM(s) IntraMuscular once  insulin glargine Injectable (LANTUS) 5 Unit(s) SubCutaneous at bedtime  insulin lispro (ADMELOG) corrective regimen sliding scale   SubCutaneous three times a day before meals  insulin lispro (ADMELOG) corrective regimen sliding scale   SubCutaneous at bedtime  aspirin enteric coated 81 milliGRAM(s) Oral daily  benzocaine/menthol Lozenge 1 Lozenge Oral two times a day PRN  clopidogrel Tablet 75 milliGRAM(s) Oral daily  dextrose 5%. 1000 milliLiter(s) IV Continuous <Continuous>  dextrose 5%. 1000 milliLiter(s) IV Continuous <Continuous>  epoetin gamaliel-epbx (RETACRIT) Injectable 8000 Unit(s) IV Push once  heparin   Injectable 5000 Unit(s) SubCutaneous every 8 hours        [PHYSICAL EXAM:  TELEMETRY:  T(C): 36.2 (10-18-23 @ 13:07), Max: 36.7 (10-17-23 @ 17:30)  HR: 68 (10-18-23 @ 13:07) (68 - 79)  BP: 166/72 (10-18-23 @ 13:07) (135/64 - 166/72)  RR: 16 (10-18-23 @ 13:07) (16 - 18)  SpO2: 100% (10-18-23 @ 13:07) (95% - 100%)  Wt(kg): --  I&O's Summary    17 Oct 2023 07:01  -  18 Oct 2023 07:00  --------------------------------------------------------  IN: 360 mL / OUT: 0 mL / NET: 360 mL    18 Oct 2023 07:01  -  18 Oct 2023 15:33  --------------------------------------------------------  IN: 360 mL / OUT: 0 mL / NET: 360 mL                                   Appearance: Normal	  HEENT:   Normal oral mucosa, PERRL, EOMI	  Neck: Supple, - JVD  Cardiovascular: Normal S1 S2, No JVD, No murmurs,   Respiratory: Lungs clear to auscultation, No Rales, Rhonchi, Wheezing	  Gastrointestinal:  Soft, Non-tender, + BS	  Skin: No rashes, No ecchymoses, No cyanosis  Extremities: Normal range of motion, No clubbing, cyanosis or edema  Vascular: Peripheral pulses palpable 2+ bilaterally  Neurologic: Non-focal  Psychiatry: A & O x 3, Mood & affect appropriate                            8.9    5.52  )-----------( 247      ( 18 Oct 2023 05:30 )             29.3     10-18    140  |  96  |  90<H>  ----------------------------<  125<H>  4.8   |  23  |  11.19<H>    Ca    8.1<L>      18 Oct 2023 05:30  Phos  7.9     10-18  Mg     2.1     10-18    TPro  5.8<L>  /  Alb  2.9<L>  /  TBili  0.2  /  DBili  x   /  AST  19  /  ALT  12  /  AlkPhos  122<H>  10-18

## 2023-10-18 NOTE — CHART NOTE - NSCHARTNOTESELECT_GEN_ALL_CORE
Interventional Cardiology/Event Note
Event Note
Interventional Cardiology/Event Note
Nutrition Follow Up/Nutrition Services
Nutrition Services

## 2023-10-18 NOTE — PROGRESS NOTE ADULT - SUBJECTIVE AND OBJECTIVE BOX
INTERVAL EVENTS: PREMA    SUBJECTIVE / INTERVAL HPI: Patient seen and examined at bedside. feeling well this AM breathing well on RA     ROS: negative unless otherwise stated above.    VITAL SIGNS:  Vital Signs Last 24 Hrs  T(C): 36.4 (18 Oct 2023 08:57), Max: 36.7 (17 Oct 2023 14:05)  T(F): 97.5 (18 Oct 2023 08:57), Max: 98.1 (17 Oct 2023 14:05)  HR: 71 (18 Oct 2023 08:57) (70 - 82)  BP: 140/65 (18 Oct 2023 08:57) (111/55 - 156/65)  BP(mean): 94 (18 Oct 2023 08:57) (94 - 94)  RR: 18 (18 Oct 2023 08:57) (16 - 18)  SpO2: 99% (18 Oct 2023 08:57) (95% - 99%)    Parameters below as of 18 Oct 2023 08:57  Patient On (Oxygen Delivery Method): room air          10-17-23 @ 07:01  -  10-18-23 @ 07:00  --------------------------------------------------------  IN: 360 mL / OUT: 0 mL / NET: 360 mL    10-18-23 @ 07:01  -  10-18-23 @ 12:24  --------------------------------------------------------  IN: 180 mL / OUT: 0 mL / NET: 180 mL        PHYSICAL EXAM:    Constitutional: NAD, comfortable in bed. Tired appearing.  HEENT: NC/AT, PERRLA, EOMI, no conjunctival pallor or scleral icterus, MMM  Neck: Supple, no JVD  Respiratory: CTA B/L. No w/r/r.   Cardiovascular: RRR, normal S1 and S2, no m/r/g.   Gastrointestinal: +BS, soft NTND, no guarding or rebound tenderness, no palpable masses   Extremities: wwp; no cyanosis, clubbing or edema.   Vascular: LUE AVF. Pulses equal and strong throughout.   Neurological: AAOx3, no CN deficits, strength and sensation intact throughout.   Skin: No gross skin abnormalities or rashes    MEDICATIONS:  MEDICATIONS  (STANDING):  amLODIPine   Tablet 10 milliGRAM(s) Oral daily  aspirin enteric coated 81 milliGRAM(s) Oral daily  atorvastatin 80 milliGRAM(s) Oral at bedtime  carvedilol 12.5 milliGRAM(s) Oral every 12 hours  cinacalcet 60 milliGRAM(s) Oral daily  clopidogrel Tablet 75 milliGRAM(s) Oral daily  dextrose 5%. 1000 milliLiter(s) (50 mL/Hr) IV Continuous <Continuous>  dextrose 5%. 1000 milliLiter(s) (100 mL/Hr) IV Continuous <Continuous>  dextrose 50% Injectable 25 Gram(s) IV Push once  dextrose 50% Injectable 12.5 Gram(s) IV Push once  dextrose 50% Injectable 25 Gram(s) IV Push once  epoetin gamaliel-epbx (RETACRIT) Injectable 8000 Unit(s) IV Push once  gabapentin 100 milliGRAM(s) Oral <User Schedule>  glucagon  Injectable 1 milliGRAM(s) IntraMuscular once  heparin   Injectable 5000 Unit(s) SubCutaneous every 8 hours  insulin glargine Injectable (LANTUS) 5 Unit(s) SubCutaneous at bedtime  insulin lispro (ADMELOG) corrective regimen sliding scale   SubCutaneous three times a day before meals  insulin lispro (ADMELOG) corrective regimen sliding scale   SubCutaneous at bedtime  montelukast 10 milliGRAM(s) Oral daily  nitroglycerin     SubLingual 0.4 milliGRAM(s) SubLingual once  pantoprazole    Tablet 40 milliGRAM(s) Oral before breakfast  polyethylene glycol 3350 17 Gram(s) Oral daily  senna 1 Tablet(s) Oral daily  simethicone 80 milliGRAM(s) Chew two times a day    MEDICATIONS  (PRN):  acetaminophen     Tablet .. 650 milliGRAM(s) Oral every 6 hours PRN Temp greater or equal to 38C (100.4F), Mild Pain (1 - 3), Moderate Pain (4 - 6)  benzocaine/menthol Lozenge 1 Lozenge Oral two times a day PRN Sore Throat  dextrose Oral Gel 15 Gram(s) Oral once PRN Blood Glucose LESS THAN 70 milliGRAM(s)/deciliter  guaiFENesin Oral Liquid (Sugar-Free) 100 milliGRAM(s) Oral every 6 hours PRN Cough      ALLERGIES:  Allergies    No Known Allergies    Intolerances        LABS:                        8.9    5.52  )-----------( 247      ( 18 Oct 2023 05:30 )             29.3     10-17    137  |  94<L>  |  68<H>  ----------------------------<  130<H>  4.3   |  28  |  8.92<H>    Ca    8.1<L>      17 Oct 2023 05:30  Phos  5.6     10-17  Mg     2.3     10-17    TPro  6.2  /  Alb  3.1<L>  /  TBili  0.2  /  DBili  x   /  AST  19  /  ALT  11  /  AlkPhos  111  10-17      Urinalysis Basic - ( 17 Oct 2023 05:30 )    Color: x / Appearance: x / SG: x / pH: x  Gluc: 130 mg/dL / Ketone: x  / Bili: x / Urobili: x   Blood: x / Protein: x / Nitrite: x   Leuk Esterase: x / RBC: x / WBC x   Sq Epi: x / Non Sq Epi: x / Bacteria: x      CAPILLARY BLOOD GLUCOSE      POCT Blood Glucose.: 125 mg/dL (18 Oct 2023 12:20)      RADIOLOGY & ADDITIONAL TESTS: Reviewed.

## 2023-10-18 NOTE — PROGRESS NOTE ADULT - NS ATTEND AMEND GEN_ALL_CORE FT
72 yo female, residing in shelter, with PMHx of ESRD on HD via left arm fistula (MWF), HTN, DM-II, CAD s/p PCI 2017, gout, and fibroids, admitted for NSTEMI.     1. CAD/NSTEMI type 1  S/p cardiac cath on 10/8/23 with Dr. Wilson revealing 3VCAD. LM minimal LI, severely calcified. mLAD 75% severly calcified, tortous. pLCx 70%, tortous. pRCA 90% at edge of previous stent, tortous.   Remains chest pain free.  Continue aspirin 81 mg and Plavix 75 mg. Atorvastatin 80 mg.    2. ESRD HD  HD tomorrow after PCI.    3. Hypertension  Continue amlodipine 10 mg.  Coreg 12.5 mg bid.    4. Anemia  Probably chronic disease, normal TSH, B12, Bilirubin,  and Folate.    5. COVID  S/p redemsivir, stable.    6. ? Depression  Psych consult     Pending shelter placement.

## 2023-10-18 NOTE — PROGRESS NOTE ADULT - PROBLEM SELECTOR PLAN 7
- Fluids: not indicated   - Replete Lytes to K>4, Mg>2 PRN   - Diet: DASH   - GI ppx: PPI   - PT Eval: home PT  - Dispo/SW: Awaiting shelter placement w/dialysis acceptance; course delayed at present due to no accepting facilities i/s/o Covid+.  SW continuing to evaluate.   - CODE: FULL code, see [facility] 10/17/23    Case discussed w/ Dr Alatorre

## 2023-10-18 NOTE — PROGRESS NOTE ADULT - PROBLEM SELECTOR PLAN 4
- c/o sore throat and low grade temp on 10/12. Found to be COVID+ 10/12/23  - Continue Montelukast 10mg QD and supportive management  - s/p remdesivir taper- completed on 10/17/23  - feeling well today    ##Flat Affect  - pt more upbeat and out of bed with PT on 10/17/23  - Holistic RN ordered  [ ] Continue to monitor with plan for Psych consult on 0/18 if continues to be an issue - c/o sore throat and low grade temp on 10/12. Found to be COVID+ 10/12/23  - Continue Montelukast 10mg QD and supportive management  - s/p remdesivir taper- completed on 10/17/23  - feeling well today    ##Flat Affect  - Holistic RN ordered

## 2023-10-18 NOTE — CHART NOTE - NSCHARTNOTEFT_GEN_A_CORE
Admitting Diagnosis:   Patient is a 73y old  Female who presents with a chief complaint of NSTEMI (17 Oct 2023 10:19)      PAST MEDICAL & SURGICAL HISTORY:  ESRD on dialysis      DM (diabetes mellitus)      Hypertension      CAD (coronary artery disease)      Gout      Diabetes mellitus      Hypertension      Mild hypercholesterolemia      ESRD on dialysis      CAD (coronary artery disease)      Colitis      H/O heart artery stent      No significant past surgical history          Current Nutrition Order:  Renal, Consistent Carbohydrate Diet w/o snack, DASH  Nepro 1x/day (425 kcal, 19g pro)    PO Intake: Good (%) [   ]  Fair (50-75%) [ X ] Poor (<25%) [   ]- intake fluctuating     GI Issues:   Protonix Senna MIRALAX and MYLICON   LBM per flow sheets 10/16  RN reports pt with c/o gas     Pain: None per flow sheets, pain meds are ordered     Skin Integrity: Camilo 20, no edema  No pressure ulcer- SX site noted    Labs:   10-18    140  |  96  |  90<H>  ----------------------------<  125<H>  4.8   |  23  |  11.19<H>    Ca    8.1<L>      18 Oct 2023 05:30  Phos  7.9     10-18  Mg     2.1     10-18    TPro  5.8<L>  /  Alb  2.9<L>  /  TBili  0.2  /  DBili  x   /  AST  19  /  ALT  12  /  AlkPhos  122<H>  10-18    CAPILLARY BLOOD GLUCOSE      POCT Blood Glucose.: 125 mg/dL (18 Oct 2023 12:20)  POCT Blood Glucose.: 105 mg/dL (18 Oct 2023 08:29)  POCT Blood Glucose.: 212 mg/dL (17 Oct 2023 22:37)  POCT Blood Glucose.: 132 mg/dL (17 Oct 2023 17:40)      Medications:  MEDICATIONS  (STANDING):  amLODIPine   Tablet 10 milliGRAM(s) Oral daily  aspirin enteric coated 81 milliGRAM(s) Oral daily  atorvastatin 80 milliGRAM(s) Oral at bedtime  carvedilol 12.5 milliGRAM(s) Oral every 12 hours  cinacalcet 60 milliGRAM(s) Oral daily  clopidogrel Tablet 75 milliGRAM(s) Oral daily  dextrose 5%. 1000 milliLiter(s) (100 mL/Hr) IV Continuous <Continuous>  dextrose 5%. 1000 milliLiter(s) (50 mL/Hr) IV Continuous <Continuous>  dextrose 50% Injectable 12.5 Gram(s) IV Push once  dextrose 50% Injectable 25 Gram(s) IV Push once  dextrose 50% Injectable 25 Gram(s) IV Push once  epoetin gamaliel-epbx (RETACRIT) Injectable 8000 Unit(s) IV Push once  gabapentin 100 milliGRAM(s) Oral <User Schedule>  glucagon  Injectable 1 milliGRAM(s) IntraMuscular once  heparin   Injectable 5000 Unit(s) SubCutaneous every 8 hours  insulin glargine Injectable (LANTUS) 5 Unit(s) SubCutaneous at bedtime  insulin lispro (ADMELOG) corrective regimen sliding scale   SubCutaneous three times a day before meals  insulin lispro (ADMELOG) corrective regimen sliding scale   SubCutaneous at bedtime  montelukast 10 milliGRAM(s) Oral daily  nitroglycerin     SubLingual 0.4 milliGRAM(s) SubLingual once  pantoprazole    Tablet 40 milliGRAM(s) Oral before breakfast  polyethylene glycol 3350 17 Gram(s) Oral daily  senna 1 Tablet(s) Oral daily  simethicone 80 milliGRAM(s) Chew two times a day    MEDICATIONS  (PRN):  acetaminophen     Tablet .. 650 milliGRAM(s) Oral every 6 hours PRN Temp greater or equal to 38C (100.4F), Mild Pain (1 - 3), Moderate Pain (4 - 6)  benzocaine/menthol Lozenge 1 Lozenge Oral two times a day PRN Sore Throat  dextrose Oral Gel 15 Gram(s) Oral once PRN Blood Glucose LESS THAN 70 milliGRAM(s)/deciliter  guaiFENesin Oral Liquid (Sugar-Free) 100 milliGRAM(s) Oral every 6 hours PRN Cough          5'5''  pounds +-10%  Wt 163 pounds BMI 27.1 %ASR=157    Weight Change:  10/16 164.4 pounds, 165 pounds  10/12 164 pounds   10/11 163.1 pounds, 166.2 pounds  ** Wts varying during admit, assume to be varying i/s/o ESRD/HD     Nutrition Focused Physical Exam: Completed [   ]  Not Pertinent [ X  ]    Estimated energy needs:   Ideal body weight used for calculations as pt >120% of IBW  Needs estimated for age and adjusted for ESRD on HD, COVID  Fluids per team 2/2 HD.     Estimated Energy Needs Weight (lbs)	125 lb  Estimated Energy Needs Weight (kg)	56.6 kg  Estimated Energy Needs From (riya/kg)	25  Estimated Energy Needs To (riya/kg)	30  Estimated Energy Needs Calculated From (riya/kg)	1415  Estimated Energy Needs Calculated To (riya/kg)	1698    Estimated Protein Needs Weight (lbs)	125 lb  Estimated Protein Needs Weight (kg)	56.6 kg  Estimated Protein Needs From (g/kg)	1.2  Estimated Protein Needs To (g/kg)	1.4  Estimated Protein Needs Calculated From (g/kg)	67.92  Estimated Protein Needs Calculated To (g/kg)	79.24    Subjective: 72 yo female, with PMHx of ESRD/HD via left arm fistula (MWF), HTN, DM-II, CAD s/p PCI 2017, gout, and fibroids, recently treated in August for Colitis in August, presented to [facility] [place]7/23 complaining of sudden onset of lower abdominal pain at 1AM, 10/10 in severity. She endorsed associated palpitations and vomiting but denied any dizziness, chest pain, diaphoresis, recent sick contacts. Found to have NSTEMI. Pt S/p cardiac cath 10/8/23 with Dr. Wilson revealing 3VCAD. LM minimal LI, severely calcified. mLAD 75% severely calcified, tortous. pLCx 70%, tortous. pRCA 90% at edge of previous stent, tortous. Planned for PCI in 5 weeks. Pt with left groin ooze s/p cardiac cath today; found with hematoma superior to access site 7 F sheath in place. Pt c/o sore throat and low grade temp, ordered for benzocaine/menthol Lozenge; Course c/b +COVID test 10/12. Pt now pending shelter and dialysis acceptance (course delayed at present due to no accepting facilities i/s/o Covid+).     Pt remains admitted on 5UR. Spoke with RN who reports fair PO intake during admit. Despite no fair PO, no oral issues reported (no issues chewing/swallowing). Does express c/o gas pain, noted bowel care ordered. Please see RD Recs below.    Previous Nutrition Diagnosis: Increased Nutrient Needs RT increased demands AEB: ESRD/HD, decreased PO PTA  Active [ X  ]  Resolved [   ]  Goal: Pt will be encouraged to consume >50% of majority of meals.    Recommendations:  1. Current Diet: Renal, Consistent Carbohydrate Diet w/o snack, DASH with Nepro 1x/day (425 kcal, 19g pro).  -- Consider to D/C DASH given fair PO and restrictive nature of diet. This will allow for more options. Monitor need for increasing oral nutrition supplements use.   2. Monitor %PO intake, Diet tolerance.   3. Pain and bowel regimens per team   4. Trend dry wts   5. Labs: monitor BMP, CBC, glucose, lytes, trend renal indices, LFTs, POCT.  6. RD to remain available for additional nutrition interventions as needed.     Education: NA.     Risk Level: High [ X  ] Moderate [   ] Low [   ].

## 2023-10-18 NOTE — PROGRESS NOTE ADULT - SUBJECTIVE AND OBJECTIVE BOX
Patient seen on HD tolerating procedure well. Patient reports anxiety due to the machine but otherwise no complaint, and is hemodynamically stable. Continue HD as prescribed.     Hemodialysis Treatment.:     Schedule: Once, Modality: Hemodialysis, Access: Arteriovenous Fistula    Dialyzer: Optiflux D665TCt, Time: 180 Min    Blood Flow: 400 mL/Min , Dialysate Flow: 500 mL/Min, Dialysate Temp: 36.5, Tubinmm (Adult)    Target Fluid Removal: 1 Liters    Dialysate Electrolytes (mEq/L): Potassium 2, Calcium 2.5, Sodium 138, Bicarbonate 35     Vitals   T(F): 97.3  HR: 66  BP: 133/63  RR: 18  SpO2: 95%    Review of Systems:  As above, otherwise negative    PHYSICAL EXAM:  GENERAL: NAD, lying in bed on HD   HEENT: NCAT, EOMI  CHEST/LUNG: No respiratory distress  HEART: Regular rate  ABDOMEN: Non-distended  EXTREMITIES: no pedal edema  Neurology: Awake, alert  SKIN: No rash or skin lesion on exposed skin   ACCESS: LUE AVF cannulated for HD

## 2023-10-18 NOTE — PROGRESS NOTE ADULT - PROBLEM SELECTOR PLAN 2
- HD via LUE AVF ([facility]). s/p HD 10/16, next plan for 10/18/23  - Continue Cinacalcet 60mg QD   - Renal following, appreciate recs - HD via LUE AVF ([facility]). s/p HD 10/18, next plan for 10/20/23  - Continue Cinacalcet 60mg QD   - Renal following, appreciate recs

## 2023-10-18 NOTE — PROGRESS NOTE ADULT - PROBLEM SELECTOR PLAN 1
- DAPT: ASA 81mg + Plavix 75mg   - Continue Atorvastatin 80mg QHS   - [facility] ([facility]): 3vCAD with mLAD 75%, pLCx 70%, and tortous pRCA 90%; s/p Heparin gtt for ACS coverage   - [facility] (10/10/23): Rota/JAMIL x1 to p-mRCA (90% ISR), LAD 75%. Plan for Staged PCI of residual LAD lesion in 5 weeks   - TTE (10/01/23): LV EF 60-65%, LVH, normal wall motion. G1DD. Trace MR. Aortic valve mildly calcified  - Patient experienced 1 episode of angina 10/13 while at rest, self resolved; EKG with TWI not seen previously on EKG from 10/11. Given known residual disease, will consider performing staged intervention during this admission if symptoms continue to occur--> No further chest pain reported

## 2023-10-18 NOTE — PROGRESS NOTE ADULT - ASSESSMENT
73F with hx of HTN, HLD, DM2, 3vCAD with mLAD 75%, pLCx 70%, and tortuous pRCA 90%, and ESRD on dialysis MWF presenting as a transfer from [facility] for NSTEMI and possible staged PCI. Now s/p LHC with p-mRCA 90% ISR s/p Rota/JAMIL x1 and residual LAD 75%. Plan for Staged PCI for residual LAD lesion in 5 weeks. Course complicated by COVID infection (day 0 10/12/2023); now pending shelter and dialysis acceptance.

## 2023-10-18 NOTE — PROGRESS NOTE ADULT - ASSESSMENT
73F ESRD admitted for NSTEMI s/p cardiac cath, now h/c c/b COVID PNA, now pending placement, stable lytes and acceptable volume status, HD today per schedule       ESRD  Electrolytes noted, to correct with HD   EDW: 74kg     Plan:  HD today per schedule   Daily BMP   Next HD if still admitted 10/20  Renal diet     Access:  LUE AVF functional     HTN:  BP stable  Hold meds on HD days for optimal UF, can resume post HD if BP still elevated for better control   UF with HD as tolerated     Anemia:  Hgb at goal 8.9  EPO with HD     MBD:  Calcium: 8.1  Phos: 7.9

## 2023-10-18 NOTE — PROGRESS NOTE ADULT - PROBLEM SELECTOR PLAN 3
- continue with amlodipine 10 mg QD; uptitrated coreg 6.25 mg BID to 12.5 mg BID.  - SBP ranging 111mmHg- 177mmHg today (177 in AM prior to medications); continue to monitor; seems 111mmHg daytime reading reflects proper response to medications at this time. - continue with amlodipine 10 mg QD; uptitrated coreg 6.25 mg BID to 12.5 mg BID.  - SBP ranging 111mmHg- 177mmHg today (177 in AM prior to medications); continue to monitor; seems 111mmHg daytime reading reflects proper response to medications at this time.  - hold BP prior to HD

## 2023-10-19 ENCOUNTER — APPOINTMENT (OUTPATIENT)
Dept: HEART AND VASCULAR | Facility: CLINIC | Age: 73
End: 2023-10-19

## 2023-10-19 LAB
ALBUMIN SERPL ELPH-MCNC: 2.8 G/DL — LOW (ref 3.3–5)
ALBUMIN SERPL ELPH-MCNC: 2.8 G/DL — LOW (ref 3.3–5)
ALP SERPL-CCNC: 111 U/L — SIGNIFICANT CHANGE UP (ref 40–120)
ALP SERPL-CCNC: 111 U/L — SIGNIFICANT CHANGE UP (ref 40–120)
ALT FLD-CCNC: 15 U/L — SIGNIFICANT CHANGE UP (ref 10–45)
ALT FLD-CCNC: 15 U/L — SIGNIFICANT CHANGE UP (ref 10–45)
ANION GAP SERPL CALC-SCNC: 17 MMOL/L — SIGNIFICANT CHANGE UP (ref 5–17)
ANION GAP SERPL CALC-SCNC: 17 MMOL/L — SIGNIFICANT CHANGE UP (ref 5–17)
AST SERPL-CCNC: 22 U/L — SIGNIFICANT CHANGE UP (ref 10–40)
AST SERPL-CCNC: 22 U/L — SIGNIFICANT CHANGE UP (ref 10–40)
BASOPHILS # BLD AUTO: 0.04 K/UL — SIGNIFICANT CHANGE UP (ref 0–0.2)
BASOPHILS # BLD AUTO: 0.04 K/UL — SIGNIFICANT CHANGE UP (ref 0–0.2)
BASOPHILS NFR BLD AUTO: 0.8 % — SIGNIFICANT CHANGE UP (ref 0–2)
BASOPHILS NFR BLD AUTO: 0.8 % — SIGNIFICANT CHANGE UP (ref 0–2)
BILIRUB SERPL-MCNC: 0.2 MG/DL — SIGNIFICANT CHANGE UP (ref 0.2–1.2)
BILIRUB SERPL-MCNC: 0.2 MG/DL — SIGNIFICANT CHANGE UP (ref 0.2–1.2)
BUN SERPL-MCNC: 51 MG/DL — HIGH (ref 7–23)
BUN SERPL-MCNC: 51 MG/DL — HIGH (ref 7–23)
CALCIUM SERPL-MCNC: 8.3 MG/DL — LOW (ref 8.4–10.5)
CALCIUM SERPL-MCNC: 8.3 MG/DL — LOW (ref 8.4–10.5)
CHLORIDE SERPL-SCNC: 96 MMOL/L — SIGNIFICANT CHANGE UP (ref 96–108)
CHLORIDE SERPL-SCNC: 96 MMOL/L — SIGNIFICANT CHANGE UP (ref 96–108)
CO2 SERPL-SCNC: 27 MMOL/L — SIGNIFICANT CHANGE UP (ref 22–31)
CO2 SERPL-SCNC: 27 MMOL/L — SIGNIFICANT CHANGE UP (ref 22–31)
CREAT SERPL-MCNC: 8.24 MG/DL — HIGH (ref 0.5–1.3)
CREAT SERPL-MCNC: 8.24 MG/DL — HIGH (ref 0.5–1.3)
EGFR: 5 ML/MIN/1.73M2 — LOW
EGFR: 5 ML/MIN/1.73M2 — LOW
EOSINOPHIL # BLD AUTO: 0.18 K/UL — SIGNIFICANT CHANGE UP (ref 0–0.5)
EOSINOPHIL # BLD AUTO: 0.18 K/UL — SIGNIFICANT CHANGE UP (ref 0–0.5)
EOSINOPHIL NFR BLD AUTO: 3.5 % — SIGNIFICANT CHANGE UP (ref 0–6)
EOSINOPHIL NFR BLD AUTO: 3.5 % — SIGNIFICANT CHANGE UP (ref 0–6)
GLUCOSE BLDC GLUCOMTR-MCNC: 187 MG/DL — HIGH (ref 70–99)
GLUCOSE BLDC GLUCOMTR-MCNC: 187 MG/DL — HIGH (ref 70–99)
GLUCOSE BLDC GLUCOMTR-MCNC: 243 MG/DL — HIGH (ref 70–99)
GLUCOSE BLDC GLUCOMTR-MCNC: 243 MG/DL — HIGH (ref 70–99)
GLUCOSE BLDC GLUCOMTR-MCNC: 247 MG/DL — HIGH (ref 70–99)
GLUCOSE BLDC GLUCOMTR-MCNC: 247 MG/DL — HIGH (ref 70–99)
GLUCOSE BLDC GLUCOMTR-MCNC: 93 MG/DL — SIGNIFICANT CHANGE UP (ref 70–99)
GLUCOSE BLDC GLUCOMTR-MCNC: 93 MG/DL — SIGNIFICANT CHANGE UP (ref 70–99)
GLUCOSE SERPL-MCNC: 93 MG/DL — SIGNIFICANT CHANGE UP (ref 70–99)
GLUCOSE SERPL-MCNC: 93 MG/DL — SIGNIFICANT CHANGE UP (ref 70–99)
HCT VFR BLD CALC: 31.3 % — LOW (ref 34.5–45)
HCT VFR BLD CALC: 31.3 % — LOW (ref 34.5–45)
HGB BLD-MCNC: 9.4 G/DL — LOW (ref 11.5–15.5)
HGB BLD-MCNC: 9.4 G/DL — LOW (ref 11.5–15.5)
IMM GRANULOCYTES NFR BLD AUTO: 0.8 % — SIGNIFICANT CHANGE UP (ref 0–0.9)
IMM GRANULOCYTES NFR BLD AUTO: 0.8 % — SIGNIFICANT CHANGE UP (ref 0–0.9)
LYMPHOCYTES # BLD AUTO: 1.44 K/UL — SIGNIFICANT CHANGE UP (ref 1–3.3)
LYMPHOCYTES # BLD AUTO: 1.44 K/UL — SIGNIFICANT CHANGE UP (ref 1–3.3)
LYMPHOCYTES # BLD AUTO: 27.9 % — SIGNIFICANT CHANGE UP (ref 13–44)
LYMPHOCYTES # BLD AUTO: 27.9 % — SIGNIFICANT CHANGE UP (ref 13–44)
MAGNESIUM SERPL-MCNC: 2 MG/DL — SIGNIFICANT CHANGE UP (ref 1.6–2.6)
MAGNESIUM SERPL-MCNC: 2 MG/DL — SIGNIFICANT CHANGE UP (ref 1.6–2.6)
MCHC RBC-ENTMCNC: 29.3 PG — SIGNIFICANT CHANGE UP (ref 27–34)
MCHC RBC-ENTMCNC: 29.3 PG — SIGNIFICANT CHANGE UP (ref 27–34)
MCHC RBC-ENTMCNC: 30 GM/DL — LOW (ref 32–36)
MCHC RBC-ENTMCNC: 30 GM/DL — LOW (ref 32–36)
MCV RBC AUTO: 97.5 FL — SIGNIFICANT CHANGE UP (ref 80–100)
MCV RBC AUTO: 97.5 FL — SIGNIFICANT CHANGE UP (ref 80–100)
MONOCYTES # BLD AUTO: 0.6 K/UL — SIGNIFICANT CHANGE UP (ref 0–0.9)
MONOCYTES # BLD AUTO: 0.6 K/UL — SIGNIFICANT CHANGE UP (ref 0–0.9)
MONOCYTES NFR BLD AUTO: 11.6 % — SIGNIFICANT CHANGE UP (ref 2–14)
MONOCYTES NFR BLD AUTO: 11.6 % — SIGNIFICANT CHANGE UP (ref 2–14)
NEUTROPHILS # BLD AUTO: 2.87 K/UL — SIGNIFICANT CHANGE UP (ref 1.8–7.4)
NEUTROPHILS # BLD AUTO: 2.87 K/UL — SIGNIFICANT CHANGE UP (ref 1.8–7.4)
NEUTROPHILS NFR BLD AUTO: 55.4 % — SIGNIFICANT CHANGE UP (ref 43–77)
NEUTROPHILS NFR BLD AUTO: 55.4 % — SIGNIFICANT CHANGE UP (ref 43–77)
NRBC # BLD: 0 /100 WBCS — SIGNIFICANT CHANGE UP (ref 0–0)
NRBC # BLD: 0 /100 WBCS — SIGNIFICANT CHANGE UP (ref 0–0)
PLATELET # BLD AUTO: 284 K/UL — SIGNIFICANT CHANGE UP (ref 150–400)
PLATELET # BLD AUTO: 284 K/UL — SIGNIFICANT CHANGE UP (ref 150–400)
POTASSIUM SERPL-MCNC: 4.2 MMOL/L — SIGNIFICANT CHANGE UP (ref 3.5–5.3)
POTASSIUM SERPL-MCNC: 4.2 MMOL/L — SIGNIFICANT CHANGE UP (ref 3.5–5.3)
POTASSIUM SERPL-SCNC: 4.2 MMOL/L — SIGNIFICANT CHANGE UP (ref 3.5–5.3)
POTASSIUM SERPL-SCNC: 4.2 MMOL/L — SIGNIFICANT CHANGE UP (ref 3.5–5.3)
PROT SERPL-MCNC: 5.9 G/DL — LOW (ref 6–8.3)
PROT SERPL-MCNC: 5.9 G/DL — LOW (ref 6–8.3)
RBC # BLD: 3.21 M/UL — LOW (ref 3.8–5.2)
RBC # BLD: 3.21 M/UL — LOW (ref 3.8–5.2)
RBC # FLD: 16.2 % — HIGH (ref 10.3–14.5)
RBC # FLD: 16.2 % — HIGH (ref 10.3–14.5)
SODIUM SERPL-SCNC: 140 MMOL/L — SIGNIFICANT CHANGE UP (ref 135–145)
SODIUM SERPL-SCNC: 140 MMOL/L — SIGNIFICANT CHANGE UP (ref 135–145)
WBC # BLD: 5.17 K/UL — SIGNIFICANT CHANGE UP (ref 3.8–10.5)
WBC # BLD: 5.17 K/UL — SIGNIFICANT CHANGE UP (ref 3.8–10.5)
WBC # FLD AUTO: 5.17 K/UL — SIGNIFICANT CHANGE UP (ref 3.8–10.5)
WBC # FLD AUTO: 5.17 K/UL — SIGNIFICANT CHANGE UP (ref 3.8–10.5)

## 2023-10-19 PROCEDURE — 99232 SBSQ HOSP IP/OBS MODERATE 35: CPT | Mod: GC

## 2023-10-19 PROCEDURE — 99233 SBSQ HOSP IP/OBS HIGH 50: CPT

## 2023-10-19 RX ORDER — LANOLIN ALCOHOL/MO/W.PET/CERES
3 CREAM (GRAM) TOPICAL ONCE
Refills: 0 | Status: COMPLETED | OUTPATIENT
Start: 2023-10-19 | End: 2023-10-19

## 2023-10-19 RX ADMIN — Medication 3 MILLIGRAM(S): at 23:00

## 2023-10-19 RX ADMIN — Medication 650 MILLIGRAM(S): at 04:56

## 2023-10-19 RX ADMIN — HEPARIN SODIUM 5000 UNIT(S): 5000 INJECTION INTRAVENOUS; SUBCUTANEOUS at 05:57

## 2023-10-19 RX ADMIN — ATORVASTATIN CALCIUM 80 MILLIGRAM(S): 80 TABLET, FILM COATED ORAL at 21:07

## 2023-10-19 RX ADMIN — Medication 81 MILLIGRAM(S): at 11:29

## 2023-10-19 RX ADMIN — SIMETHICONE 80 MILLIGRAM(S): 80 TABLET, CHEWABLE ORAL at 17:07

## 2023-10-19 RX ADMIN — SIMETHICONE 80 MILLIGRAM(S): 80 TABLET, CHEWABLE ORAL at 05:56

## 2023-10-19 RX ADMIN — AMLODIPINE BESYLATE 10 MILLIGRAM(S): 2.5 TABLET ORAL at 05:56

## 2023-10-19 RX ADMIN — Medication 650 MILLIGRAM(S): at 05:30

## 2023-10-19 RX ADMIN — MONTELUKAST 10 MILLIGRAM(S): 4 TABLET, CHEWABLE ORAL at 11:28

## 2023-10-19 RX ADMIN — CARVEDILOL PHOSPHATE 12.5 MILLIGRAM(S): 80 CAPSULE, EXTENDED RELEASE ORAL at 21:08

## 2023-10-19 RX ADMIN — Medication 1: at 14:19

## 2023-10-19 RX ADMIN — INSULIN GLARGINE 5 UNIT(S): 100 INJECTION, SOLUTION SUBCUTANEOUS at 22:00

## 2023-10-19 RX ADMIN — CLOPIDOGREL BISULFATE 75 MILLIGRAM(S): 75 TABLET, FILM COATED ORAL at 11:29

## 2023-10-19 RX ADMIN — PANTOPRAZOLE SODIUM 40 MILLIGRAM(S): 20 TABLET, DELAYED RELEASE ORAL at 05:56

## 2023-10-19 RX ADMIN — CINACALCET 60 MILLIGRAM(S): 30 TABLET, FILM COATED ORAL at 11:28

## 2023-10-19 RX ADMIN — HEPARIN SODIUM 5000 UNIT(S): 5000 INJECTION INTRAVENOUS; SUBCUTANEOUS at 21:07

## 2023-10-19 RX ADMIN — HEPARIN SODIUM 5000 UNIT(S): 5000 INJECTION INTRAVENOUS; SUBCUTANEOUS at 14:17

## 2023-10-19 RX ADMIN — CARVEDILOL PHOSPHATE 12.5 MILLIGRAM(S): 80 CAPSULE, EXTENDED RELEASE ORAL at 09:40

## 2023-10-19 RX ADMIN — Medication 650 MILLIGRAM(S): at 23:00

## 2023-10-19 NOTE — PROGRESS NOTE ADULT - SUBJECTIVE AND OBJECTIVE BOX
Cardiology PA Progress Note    S: Pt seen and examined bedside.  Patient denies C/P, SOB, N/V, dizziness, palpitations, and diaphoresis.  Pt denies fever/chills, dysuria, abdominal pain, diarrhea, and cough  12 Point ROS otherwise negative except as per HPI/subjective.     O: Vital Signs Last 24 Hrs  T(C): 36.7 (19 Oct 2023 21:08), Max: 36.9 (19 Oct 2023 11:39)  T(F): 98 (19 Oct 2023 21:08), Max: 98.4 (19 Oct 2023 11:39)  HR: 75 (19 Oct 2023 21:08) (68 - 75)  BP: 150/70 (19 Oct 2023 21:08) (113/57 - 155/69)  BP(mean): 99 (19 Oct 2023 04:57) (99 - 99)  RR: 17 (19 Oct 2023 21:08) (17 - 18)  SpO2: 96% (19 Oct 2023 21:08) (93% - 98%)    Parameters below as of 19 Oct 2023 21:08  Patient On (Oxygen Delivery Method): room air    PHYSICAL EXAM:  GEN: NAD  HEENT: No JVD  PULM:  CTA B/L  CARD:  RRR, S1 and S2   ABD: +BS, NT, soft/ND	  EXT: No Edema B/L LE, LUE fistula w/ good thrill and bruit   NEURO: A+Ox3, no focal deficit  PSYCH: Mood Appropriate, normal affect     LABS:                        9.4    5.17  )-----------( 284      ( 19 Oct 2023 07:51 )             31.3     10-19    140  |  96  |  51<H>  ----------------------------<  93  4.2   |  27  |  8.24<H>    Ca    8.3<L>      19 Oct 2023 07:51  Phos  7.9     10-18  Mg     2.0     10-19    TPro  5.9<L>  /  Alb  2.8<L>  /  TBili  0.2  /  DBili  x   /  AST  22  /  ALT  15  /  AlkPhos  111  10-19          10-18 @ 07:01  -  10-19 @ 07:00  --------------------------------------------------------  IN: 360 mL / OUT: 1000 mL / NET: -640 mL    10-19 @ 07:01  -  10-19 @ 22:32  --------------------------------------------------------  IN: 0 mL / OUT: 0 mL / NET: 0 mL

## 2023-10-19 NOTE — PROGRESS NOTE ADULT - PROBLEM SELECTOR PLAN 2
- HD via LUE AVF ([facility]). s/p HD 10/18, next plan for 10/20/23  - Continue Cinacalcet 60mg QD   - Renal following, appreciate recs

## 2023-10-19 NOTE — PROGRESS NOTE ADULT - PROBLEM SELECTOR PLAN 1
- DAPT: ASA 81mg + Plavix 75mg   - Continue Atorvastatin 80mg QHS   - [facility] ([facility] 3vCAD with mLAD 75%, pLCx 70%, and tortous pRCA 90%; s/p Heparin gtt for ACS coverage   - [facility] (10/10/23): Rota/JAMIL x1 to p-mRCA (90% ISR), LAD 75%. Plan for Staged PCI of residual LAD lesion in 5 weeks   - TTE (10/01/23): LV EF 60-65%, LVH, normal wall motion. G1DD. Trace MR. Aortic valve mildly calcified  - Patient experienced 1 episode of angina 10/13 while at rest, self resolved; EKG with TWI not seen previously on EKG from 10/11. Given known residual disease, will consider performing staged intervention during this admission if symptoms continue to occur--> No further chest pain reported

## 2023-10-19 NOTE — PROGRESS NOTE ADULT - ASSESSMENT
73-year-old female with hx of HTN, HLD, DM2, 3vCAD with mLAD 75%, pLCx 70%, and tortuous pRCA 90%, and ESRD on dialysis MWF presenting as a transfer from [facility] for NSTEMI and possible staged PCI. Now s/p LHC with p-mRCA 90% ISR s/p Rota/JAMIL x1 and residual LAD 75%. Plan for Staged PCI for residual LAD lesion in 5 weeks. Course complicated by COVID infection (day 0 10/12/2023); now pending shelter and dialysis acceptance.

## 2023-10-19 NOTE — PROGRESS NOTE ADULT - SUBJECTIVE AND OBJECTIVE BOX
OVERNIGHT EVENTS: NAEO    SUBJECTIVE / INTERVAL HPI: Patient seen and examined at bedside. Patient denying chest pain, SOB, palpitations, cough. Patient denies fever, chills. No complaints   Remaining ROS negative       PHYSICAL EXAM:    General:NAD.   HEENT: NC/AT; PERRL, anicteric sclera; MMM  Neck: supple  Cardiovascular: +S1/S2, RRR  Respiratory: CTA B/L; no W/R/R  Gastrointestinal: soft, NT/ND; +BSx4  Extremities: WWP; no edema, clubbing or cyanosis  Vascular: 2+ radial, DP/PT pulses B/L  Neurological: AAOx3; no focal deficits  Psychiatric: pleasant mood and affect  Dermatologic: no appreciable wounds or damage to the skin    VITAL SIGNS:  Vital Signs Last 24 Hrs  T(C): 36.5 (19 Oct 2023 09:32), Max: 37 (18 Oct 2023 20:39)  T(F): 97.7 (19 Oct 2023 09:32), Max: 98.6 (18 Oct 2023 20:39)  HR: 68 (19 Oct 2023 09:32) (66 - 72)  BP: 142/65 (19 Oct 2023 09:32) (133/63 - 166/72)  BP(mean): 99 (19 Oct 2023 04:57) (90 - 104)  RR: 17 (19 Oct 2023 09:32) (16 - 18)  SpO2: 98% (19 Oct 2023 09:32) (95% - 100%)    Parameters below as of 19 Oct 2023 09:32  Patient On (Oxygen Delivery Method): room air          MEDICATIONS:  MEDICATIONS  (STANDING):  amLODIPine   Tablet 10 milliGRAM(s) Oral daily  aspirin enteric coated 81 milliGRAM(s) Oral daily  atorvastatin 80 milliGRAM(s) Oral at bedtime  carvedilol 12.5 milliGRAM(s) Oral every 12 hours  cinacalcet 60 milliGRAM(s) Oral daily  clopidogrel Tablet 75 milliGRAM(s) Oral daily  dextrose 5%. 1000 milliLiter(s) (50 mL/Hr) IV Continuous <Continuous>  dextrose 5%. 1000 milliLiter(s) (100 mL/Hr) IV Continuous <Continuous>  dextrose 50% Injectable 25 Gram(s) IV Push once  dextrose 50% Injectable 25 Gram(s) IV Push once  dextrose 50% Injectable 12.5 Gram(s) IV Push once  gabapentin 100 milliGRAM(s) Oral <User Schedule>  glucagon  Injectable 1 milliGRAM(s) IntraMuscular once  heparin   Injectable 5000 Unit(s) SubCutaneous every 8 hours  insulin glargine Injectable (LANTUS) 5 Unit(s) SubCutaneous at bedtime  insulin lispro (ADMELOG) corrective regimen sliding scale   SubCutaneous at bedtime  insulin lispro (ADMELOG) corrective regimen sliding scale   SubCutaneous three times a day before meals  montelukast 10 milliGRAM(s) Oral daily  nitroglycerin     SubLingual 0.4 milliGRAM(s) SubLingual once  pantoprazole    Tablet 40 milliGRAM(s) Oral before breakfast  polyethylene glycol 3350 17 Gram(s) Oral daily  senna 1 Tablet(s) Oral daily  simethicone 80 milliGRAM(s) Chew two times a day    MEDICATIONS  (PRN):  acetaminophen     Tablet .. 650 milliGRAM(s) Oral every 6 hours PRN Temp greater or equal to 38C (100.4F), Mild Pain (1 - 3), Moderate Pain (4 - 6)  benzocaine/menthol Lozenge 1 Lozenge Oral two times a day PRN Sore Throat  dextrose Oral Gel 15 Gram(s) Oral once PRN Blood Glucose LESS THAN 70 milliGRAM(s)/deciliter  guaiFENesin Oral Liquid (Sugar-Free) 100 milliGRAM(s) Oral every 6 hours PRN Cough      ALLERGIES:  Allergies    No Known Allergies    Intolerances        LABS:                        9.4    5.17  )-----------( 284      ( 19 Oct 2023 07:51 )             31.3     10-19    140  |  96  |  51<H>  ----------------------------<  93  4.2   |  27  |  8.24<H>    Ca    8.3<L>      19 Oct 2023 07:51  Phos  7.9     10-18  Mg     2.0     10-19    TPro  5.9<L>  /  Alb  2.8<L>  /  TBili  0.2  /  DBili  x   /  AST  22  /  ALT  15  /  AlkPhos  111  10-19      Urinalysis Basic - ( 19 Oct 2023 07:51 )    Color: x / Appearance: x / SG: x / pH: x  Gluc: 93 mg/dL / Ketone: x  / Bili: x / Urobili: x   Blood: x / Protein: x / Nitrite: x   Leuk Esterase: x / RBC: x / WBC x   Sq Epi: x / Non Sq Epi: x / Bacteria: x      CAPILLARY BLOOD GLUCOSE      POCT Blood Glucose.: 93 mg/dL (19 Oct 2023 06:35)      RADIOLOGY & ADDITIONAL TESTS: Reviewed. OVERNIGHT EVENTS: NAEO    SUBJECTIVE / INTERVAL HPI: Patient seen and examined at bedside. Patient denying chest pain, SOB, palpitations, fever or chills. C/o gas pain and dry cough. Is moving bowels w/ last BM this morning.   Remaining ROS negative       PHYSICAL EXAM:    Constitutional: NAD, comfortable in bed. Tired appearing.  HEENT: NC/AT, PERRLA, EOMI, no conjunctival pallor or scleral icterus, MMM  Neck: Supple, no JVD  Respiratory: CTA B/L. No w/r/r.   Cardiovascular: RRR, normal S1 and S2, no m/r/g.   Gastrointestinal: +BS, soft NTND, no guarding or rebound tenderness, no palpable masses   Extremities: wwp; no cyanosis, clubbing or edema.   Vascular: LUE AVF. Pulses equal and strong throughout.   Neurological: AAOx3, no CN deficits, strength and sensation intact throughout.   Skin: No gross skin abnormalities or rashes    VITAL SIGNS:  Vital Signs Last 24 Hrs  T(C): 36.5 (19 Oct 2023 09:32), Max: 37 (18 Oct 2023 20:39)  T(F): 97.7 (19 Oct 2023 09:32), Max: 98.6 (18 Oct 2023 20:39)  HR: 68 (19 Oct 2023 09:32) (66 - 72)  BP: 142/65 (19 Oct 2023 09:32) (133/63 - 166/72)  BP(mean): 99 (19 Oct 2023 04:57) (90 - 104)  RR: 17 (19 Oct 2023 09:32) (16 - 18)  SpO2: 98% (19 Oct 2023 09:32) (95% - 100%)    Parameters below as of 19 Oct 2023 09:32  Patient On (Oxygen Delivery Method): room air          MEDICATIONS:  MEDICATIONS  (STANDING):  amLODIPine   Tablet 10 milliGRAM(s) Oral daily  aspirin enteric coated 81 milliGRAM(s) Oral daily  atorvastatin 80 milliGRAM(s) Oral at bedtime  carvedilol 12.5 milliGRAM(s) Oral every 12 hours  cinacalcet 60 milliGRAM(s) Oral daily  clopidogrel Tablet 75 milliGRAM(s) Oral daily  dextrose 5%. 1000 milliLiter(s) (50 mL/Hr) IV Continuous <Continuous>  dextrose 5%. 1000 milliLiter(s) (100 mL/Hr) IV Continuous <Continuous>  dextrose 50% Injectable 25 Gram(s) IV Push once  dextrose 50% Injectable 25 Gram(s) IV Push once  dextrose 50% Injectable 12.5 Gram(s) IV Push once  gabapentin 100 milliGRAM(s) Oral <User Schedule>  glucagon  Injectable 1 milliGRAM(s) IntraMuscular once  heparin   Injectable 5000 Unit(s) SubCutaneous every 8 hours  insulin glargine Injectable (LANTUS) 5 Unit(s) SubCutaneous at bedtime  insulin lispro (ADMELOG) corrective regimen sliding scale   SubCutaneous at bedtime  insulin lispro (ADMELOG) corrective regimen sliding scale   SubCutaneous three times a day before meals  montelukast 10 milliGRAM(s) Oral daily  nitroglycerin     SubLingual 0.4 milliGRAM(s) SubLingual once  pantoprazole    Tablet 40 milliGRAM(s) Oral before breakfast  polyethylene glycol 3350 17 Gram(s) Oral daily  senna 1 Tablet(s) Oral daily  simethicone 80 milliGRAM(s) Chew two times a day    MEDICATIONS  (PRN):  acetaminophen     Tablet .. 650 milliGRAM(s) Oral every 6 hours PRN Temp greater or equal to 38C (100.4F), Mild Pain (1 - 3), Moderate Pain (4 - 6)  benzocaine/menthol Lozenge 1 Lozenge Oral two times a day PRN Sore Throat  dextrose Oral Gel 15 Gram(s) Oral once PRN Blood Glucose LESS THAN 70 milliGRAM(s)/deciliter  guaiFENesin Oral Liquid (Sugar-Free) 100 milliGRAM(s) Oral every 6 hours PRN Cough      ALLERGIES:  Allergies    No Known Allergies    Intolerances        LABS:                        9.4    5.17  )-----------( 284      ( 19 Oct 2023 07:51 )             31.3     10-19    140  |  96  |  51<H>  ----------------------------<  93  4.2   |  27  |  8.24<H>    Ca    8.3<L>      19 Oct 2023 07:51  Phos  7.9     10-18  Mg     2.0     10-19    TPro  5.9<L>  /  Alb  2.8<L>  /  TBili  0.2  /  DBili  x   /  AST  22  /  ALT  15  /  AlkPhos  111  10-19      Urinalysis Basic - ( 19 Oct 2023 07:51 )    Color: x / Appearance: x / SG: x / pH: x  Gluc: 93 mg/dL / Ketone: x  / Bili: x / Urobili: x   Blood: x / Protein: x / Nitrite: x   Leuk Esterase: x / RBC: x / WBC x   Sq Epi: x / Non Sq Epi: x / Bacteria: x      CAPILLARY BLOOD GLUCOSE      POCT Blood Glucose.: 93 mg/dL (19 Oct 2023 06:35)      RADIOLOGY & ADDITIONAL TESTS: Reviewed.

## 2023-10-19 NOTE — PROGRESS NOTE ADULT - NS ATTEND AMEND GEN_ALL_CORE FT
72 yo female, residing in shelter, with PMHx of ESRD on HD via left arm fistula (MWF), HTN, DM-II, CAD s/p PCI 2017, gout, and fibroids, admitted for NSTEMI.     1. CAD/NSTEMI type 1  S/p cardiac cath on 10/8/23 with Dr. Wilson revealing 3VCAD. LM minimal LI, severely calcified. mLAD 75% severly calcified, tortous. pLCx 70%, tortous. pRCA 90% at edge of previous stent, tortous.   Remains chest pain free.  Continue aspirin 81 mg and Plavix 75 mg. Atorvastatin 80 mg.    2. ESRD HD  Continue her HD schedule.    3. Hypertension  Continue amlodipine 10 mg.  Coreg 12.5 mg bid.    4. Anemia  Probably chronic disease, normal TSH, B12, Bilirubin,  and Folate.    5. COVID  S/p redemsivir, completed treatment, asymptomatic, stable.    6. ? Depression  Mood improved.    Pending shelter placement.

## 2023-10-19 NOTE — PROGRESS NOTE ADULT - PROBLEM SELECTOR PLAN 4
- c/o sore throat and low grade temp on 10/12. Found to be COVID+ 10/12/23  - Continue Montelukast 10mg QD and supportive management  - s/p remdesivir taper- completed on 10/17/23    ##Flat Affect  #RESOLVED  - Holistic RN ordered

## 2023-10-19 NOTE — PROGRESS NOTE ADULT - ASSESSMENT
This is a 73F with PMHX of CAD s/p PCI (2017) and recent T1 NSTEMI (s/p recent cardiac cath), ESRD on HD (via LUE AVF), HTN, T2DM, gout, fibroids who presents with a chief complaint of NSTEMI. Medicine consulted for COVID.     #COVID-19   PCR+ on 10/12, reports sore throat. Afebrile. WBC WNL. On RA 96%. No reported history of asthma/copd. Vaccinated with "multiple boosters" per patient (J&J).  Unable to receive paxlovid given CrCl <30. Does not qualify for dexamethasone given not on mechanical ventilation.   - s/p Remdesivir x5 day course (ended 10/17)  - Supportive care:  - Standing tylenol 650mg q6hrs for fever  - C/w Robitussin-DM every 8 hours  - C/w Montelukast 10mg qd  - Cepacol lozenges for sore throat  - DVT PPX while inpatient    #T2DM (A1c 5.1% 10/2023)  - increase lantus to 7U nightly   - FS q6hrs  - C/w mISS    Dispo: Pending shelter     Med consult will continue to follow.  Thank you for this consult. Please page [phone] for any questions.  Recommendations not final until attestation signed by attending. This is a 73F with PMHX of CAD s/p PCI (2017) and recent T1 NSTEMI (s/p recent cardiac cath), ESRD on HD (via LUE AVF), HTN, T2DM, gout, fibroids who presents with a chief complaint of NSTEMI. Medicine consulted for COVID.     #COVID-19   PCR+ on 10/12, reports sore throat. Afebrile. WBC WNL. On RA 96%. No reported history of asthma/copd. Vaccinated with "multiple boosters" per patient (J&J).  Unable to receive paxlovid given CrCl <30. Does not qualify for dexamethasone given not on mechanical ventilation.   - s/p Remdesivir x5 day course (ended 10/17)  - Supportive care:  - Standing tylenol 650mg q6hrs for fever  - C/w Robitussin-DM every 8 hours  - C/w Montelukast 10mg qd  - Cepacol lozenges for sore throat  - DVT PPX while inpatient  - Patient can receive influenza vaccination anytime prior to d/c.     #T2DM (A1c 5.1% 10/2023)  - increase lantus to 7U nightly   - FS q6hrs  - C/w mISS      Dispo: Pending shelter     Med consult will continue to follow.  Thank you for this consult. Please page [phone] for any questions.  Recommendations not final until attestation signed by attending.

## 2023-10-19 NOTE — PROGRESS NOTE ADULT - PROBLEM SELECTOR PLAN 3
- continue with amlodipine 10 mg QD; uptitrated coreg 6.25 mg BID to 12.5 mg BID.  - SBP ranging 110s-150s  - hold BP prior to HD - continue with amlodipine 10 mg QD; uptitrated coreg 6.25 mg BID to 12.5 mg BID  - SBP ranging 110s-150s  - hold BP prior to HD

## 2023-10-20 LAB
ALBUMIN SERPL ELPH-MCNC: 2.7 G/DL — LOW (ref 3.3–5)
ALBUMIN SERPL ELPH-MCNC: 2.7 G/DL — LOW (ref 3.3–5)
ALP SERPL-CCNC: 142 U/L — HIGH (ref 40–120)
ALP SERPL-CCNC: 142 U/L — HIGH (ref 40–120)
ALT FLD-CCNC: 20 U/L — SIGNIFICANT CHANGE UP (ref 10–45)
ALT FLD-CCNC: 20 U/L — SIGNIFICANT CHANGE UP (ref 10–45)
ANION GAP SERPL CALC-SCNC: 17 MMOL/L — SIGNIFICANT CHANGE UP (ref 5–17)
ANION GAP SERPL CALC-SCNC: 17 MMOL/L — SIGNIFICANT CHANGE UP (ref 5–17)
AST SERPL-CCNC: 25 U/L — SIGNIFICANT CHANGE UP (ref 10–40)
AST SERPL-CCNC: 25 U/L — SIGNIFICANT CHANGE UP (ref 10–40)
BILIRUB SERPL-MCNC: 0.2 MG/DL — SIGNIFICANT CHANGE UP (ref 0.2–1.2)
BILIRUB SERPL-MCNC: 0.2 MG/DL — SIGNIFICANT CHANGE UP (ref 0.2–1.2)
BUN SERPL-MCNC: 70 MG/DL — HIGH (ref 7–23)
BUN SERPL-MCNC: 70 MG/DL — HIGH (ref 7–23)
CALCIUM SERPL-MCNC: 7.9 MG/DL — LOW (ref 8.4–10.5)
CALCIUM SERPL-MCNC: 7.9 MG/DL — LOW (ref 8.4–10.5)
CHLORIDE SERPL-SCNC: 98 MMOL/L — SIGNIFICANT CHANGE UP (ref 96–108)
CHLORIDE SERPL-SCNC: 98 MMOL/L — SIGNIFICANT CHANGE UP (ref 96–108)
CO2 SERPL-SCNC: 23 MMOL/L — SIGNIFICANT CHANGE UP (ref 22–31)
CO2 SERPL-SCNC: 23 MMOL/L — SIGNIFICANT CHANGE UP (ref 22–31)
CREAT SERPL-MCNC: 10.2 MG/DL — HIGH (ref 0.5–1.3)
CREAT SERPL-MCNC: 10.2 MG/DL — HIGH (ref 0.5–1.3)
EGFR: 4 ML/MIN/1.73M2 — LOW
EGFR: 4 ML/MIN/1.73M2 — LOW
GLUCOSE BLDC GLUCOMTR-MCNC: 128 MG/DL — HIGH (ref 70–99)
GLUCOSE BLDC GLUCOMTR-MCNC: 128 MG/DL — HIGH (ref 70–99)
GLUCOSE BLDC GLUCOMTR-MCNC: 147 MG/DL — HIGH (ref 70–99)
GLUCOSE BLDC GLUCOMTR-MCNC: 147 MG/DL — HIGH (ref 70–99)
GLUCOSE BLDC GLUCOMTR-MCNC: 170 MG/DL — HIGH (ref 70–99)
GLUCOSE BLDC GLUCOMTR-MCNC: 170 MG/DL — HIGH (ref 70–99)
GLUCOSE BLDC GLUCOMTR-MCNC: 228 MG/DL — HIGH (ref 70–99)
GLUCOSE BLDC GLUCOMTR-MCNC: 228 MG/DL — HIGH (ref 70–99)
GLUCOSE SERPL-MCNC: 118 MG/DL — HIGH (ref 70–99)
GLUCOSE SERPL-MCNC: 118 MG/DL — HIGH (ref 70–99)
HCT VFR BLD CALC: 34.2 % — LOW (ref 34.5–45)
HCT VFR BLD CALC: 34.2 % — LOW (ref 34.5–45)
HGB BLD-MCNC: 9.9 G/DL — LOW (ref 11.5–15.5)
HGB BLD-MCNC: 9.9 G/DL — LOW (ref 11.5–15.5)
MAGNESIUM SERPL-MCNC: 2.1 MG/DL — SIGNIFICANT CHANGE UP (ref 1.6–2.6)
MAGNESIUM SERPL-MCNC: 2.1 MG/DL — SIGNIFICANT CHANGE UP (ref 1.6–2.6)
MCHC RBC-ENTMCNC: 28.9 GM/DL — LOW (ref 32–36)
MCHC RBC-ENTMCNC: 28.9 GM/DL — LOW (ref 32–36)
MCHC RBC-ENTMCNC: 29 PG — SIGNIFICANT CHANGE UP (ref 27–34)
MCHC RBC-ENTMCNC: 29 PG — SIGNIFICANT CHANGE UP (ref 27–34)
MCV RBC AUTO: 100.3 FL — HIGH (ref 80–100)
MCV RBC AUTO: 100.3 FL — HIGH (ref 80–100)
NRBC # BLD: 0 /100 WBCS — SIGNIFICANT CHANGE UP (ref 0–0)
NRBC # BLD: 0 /100 WBCS — SIGNIFICANT CHANGE UP (ref 0–0)
PLATELET # BLD AUTO: 283 K/UL — SIGNIFICANT CHANGE UP (ref 150–400)
PLATELET # BLD AUTO: 283 K/UL — SIGNIFICANT CHANGE UP (ref 150–400)
POTASSIUM SERPL-MCNC: 4.3 MMOL/L — SIGNIFICANT CHANGE UP (ref 3.5–5.3)
POTASSIUM SERPL-MCNC: 4.3 MMOL/L — SIGNIFICANT CHANGE UP (ref 3.5–5.3)
POTASSIUM SERPL-SCNC: 4.3 MMOL/L — SIGNIFICANT CHANGE UP (ref 3.5–5.3)
POTASSIUM SERPL-SCNC: 4.3 MMOL/L — SIGNIFICANT CHANGE UP (ref 3.5–5.3)
PROT SERPL-MCNC: 5.9 G/DL — LOW (ref 6–8.3)
PROT SERPL-MCNC: 5.9 G/DL — LOW (ref 6–8.3)
RBC # BLD: 3.41 M/UL — LOW (ref 3.8–5.2)
RBC # BLD: 3.41 M/UL — LOW (ref 3.8–5.2)
RBC # FLD: 16.3 % — HIGH (ref 10.3–14.5)
RBC # FLD: 16.3 % — HIGH (ref 10.3–14.5)
SARS-COV-2 RNA SPEC QL NAA+PROBE: DETECTED
SARS-COV-2 RNA SPEC QL NAA+PROBE: DETECTED
SODIUM SERPL-SCNC: 138 MMOL/L — SIGNIFICANT CHANGE UP (ref 135–145)
SODIUM SERPL-SCNC: 138 MMOL/L — SIGNIFICANT CHANGE UP (ref 135–145)
WBC # BLD: 5.1 K/UL — SIGNIFICANT CHANGE UP (ref 3.8–10.5)
WBC # BLD: 5.1 K/UL — SIGNIFICANT CHANGE UP (ref 3.8–10.5)
WBC # FLD AUTO: 5.1 K/UL — SIGNIFICANT CHANGE UP (ref 3.8–10.5)
WBC # FLD AUTO: 5.1 K/UL — SIGNIFICANT CHANGE UP (ref 3.8–10.5)

## 2023-10-20 PROCEDURE — 99233 SBSQ HOSP IP/OBS HIGH 50: CPT

## 2023-10-20 PROCEDURE — 99232 SBSQ HOSP IP/OBS MODERATE 35: CPT | Mod: GC

## 2023-10-20 PROCEDURE — 90937 HEMODIALYSIS REPEATED EVAL: CPT

## 2023-10-20 RX ORDER — CARVEDILOL PHOSPHATE 80 MG/1
25 CAPSULE, EXTENDED RELEASE ORAL EVERY 12 HOURS
Refills: 0 | Status: DISCONTINUED | OUTPATIENT
Start: 2023-10-20 | End: 2023-10-22

## 2023-10-20 RX ADMIN — SIMETHICONE 80 MILLIGRAM(S): 80 TABLET, CHEWABLE ORAL at 05:29

## 2023-10-20 RX ADMIN — CLOPIDOGREL BISULFATE 75 MILLIGRAM(S): 75 TABLET, FILM COATED ORAL at 15:43

## 2023-10-20 RX ADMIN — HEPARIN SODIUM 5000 UNIT(S): 5000 INJECTION INTRAVENOUS; SUBCUTANEOUS at 05:29

## 2023-10-20 RX ADMIN — CARVEDILOL PHOSPHATE 25 MILLIGRAM(S): 80 CAPSULE, EXTENDED RELEASE ORAL at 17:27

## 2023-10-20 RX ADMIN — CINACALCET 60 MILLIGRAM(S): 30 TABLET, FILM COATED ORAL at 15:42

## 2023-10-20 RX ADMIN — Medication 650 MILLIGRAM(S): at 00:00

## 2023-10-20 RX ADMIN — Medication 650 MILLIGRAM(S): at 18:57

## 2023-10-20 RX ADMIN — ATORVASTATIN CALCIUM 80 MILLIGRAM(S): 80 TABLET, FILM COATED ORAL at 22:15

## 2023-10-20 RX ADMIN — AMLODIPINE BESYLATE 10 MILLIGRAM(S): 2.5 TABLET ORAL at 05:28

## 2023-10-20 RX ADMIN — HEPARIN SODIUM 5000 UNIT(S): 5000 INJECTION INTRAVENOUS; SUBCUTANEOUS at 15:41

## 2023-10-20 RX ADMIN — Medication 650 MILLIGRAM(S): at 18:31

## 2023-10-20 RX ADMIN — GABAPENTIN 100 MILLIGRAM(S): 400 CAPSULE ORAL at 22:15

## 2023-10-20 RX ADMIN — Medication 1: at 17:27

## 2023-10-20 RX ADMIN — SIMETHICONE 80 MILLIGRAM(S): 80 TABLET, CHEWABLE ORAL at 17:27

## 2023-10-20 RX ADMIN — Medication 81 MILLIGRAM(S): at 15:43

## 2023-10-20 RX ADMIN — POLYETHYLENE GLYCOL 3350 17 GRAM(S): 17 POWDER, FOR SOLUTION ORAL at 15:43

## 2023-10-20 RX ADMIN — INSULIN GLARGINE 5 UNIT(S): 100 INJECTION, SOLUTION SUBCUTANEOUS at 22:15

## 2023-10-20 RX ADMIN — MONTELUKAST 10 MILLIGRAM(S): 4 TABLET, CHEWABLE ORAL at 15:43

## 2023-10-20 RX ADMIN — HEPARIN SODIUM 5000 UNIT(S): 5000 INJECTION INTRAVENOUS; SUBCUTANEOUS at 22:14

## 2023-10-20 RX ADMIN — CARVEDILOL PHOSPHATE 12.5 MILLIGRAM(S): 80 CAPSULE, EXTENDED RELEASE ORAL at 10:24

## 2023-10-20 RX ADMIN — SENNA PLUS 1 TABLET(S): 8.6 TABLET ORAL at 15:43

## 2023-10-20 RX ADMIN — PANTOPRAZOLE SODIUM 40 MILLIGRAM(S): 20 TABLET, DELAYED RELEASE ORAL at 05:29

## 2023-10-20 NOTE — PROGRESS NOTE ADULT - PROBLEM SELECTOR PLAN 3
- continue with amlodipine 10 mg QD; uptitrated coreg 6.25 mg BID to 12.5 mg BID  - SBP ranging 110s-150s  - hold BP prior to HD

## 2023-10-20 NOTE — PROGRESS NOTE ADULT - NS ATTEND AMEND GEN_ALL_CORE FT
74 yo female, residing in shelter, with PMHx of ESRD on HD via left arm fistula (MWF), HTN, DM-II, CAD s/p PCI 2017, gout, and fibroids, admitted for NSTEMI.     1. CAD/NSTEMI type 1  S/p cardiac cath on 10/8/23 with Dr. Wilson revealing 3VCAD. LM minimal LI, severely calcified. mLAD 75% severly calcified, tortous. pLCx 70%, tortous. pRCA 90% at edge of previous stent, tortous.   Remains chest pain free.  Continue aspirin 81 mg and Plavix 75 mg. Atorvastatin 80 mg.    2. ESRD HD  Continue her HD schedule.    3. Hypertension  Continue amlodipine 10 mg.  Increase coreg to 25 mg bid.    4. Anemia  Probably chronic disease, normal TSH, B12, Bilirubin,  and Folate.    5. COVID  S/p redemsivir, completed treatment, asymptomatic, stable.    6. ? Depression  Mood improved.    Pending shelter placement

## 2023-10-20 NOTE — PROGRESS NOTE ADULT - ASSESSMENT
73F ESRD admitted for NSTEMI s/p cardiac cath, now h/c c/b COVID PNA, now pending placement, stable lytes and acceptable volume status, HD today per schedule       ESRD  EDW: 74kg     Plan:  Continue HD today as ordered below per schedule   Daily BMP   Next HD plan for 10/23  Renal diet     Hemodialysis Treatment.:     Schedule: Once, Modality: Hemodialysis, Access: Arteriovenous Fistula    Dialyzer: Optiflux [id number], Time: 180 Min    Blood Flow: 400 mL/Min , Dialysate Flow: 500 mL/Min, Dialysate Temp: 36.5, Tubinmm (Adult)    Target Fluid Removal: 1 Liters    Dialysate Electrolytes (mEq/L): Potassium 3, Calcium 2.5, Sodium 138, Bicarbonate 35 (10-20-23 @ 08:30) [Completed]      Access:  LUE AVF functional     HTN:  BP stable  Hold meds on HD days for optimal UF, can resume post HD if BP still elevated for better control   UF with HD as tolerated     Anemia:  Hgb at goal 9.9  EPO with HD - last given 10/18    MBD:  Calcium: 7.9  Phos: 7.9, consistently above goal  Consider start sevelamer 800 TID with meals

## 2023-10-20 NOTE — PROGRESS NOTE ADULT - SUBJECTIVE AND OBJECTIVE BOX
O/N Events:    Subjective/ROS: Patient seen and examined at bedside.     Denies Fever/Chills, HA, CP, SOB, n/v, changes in bowel/urinary habits.  12pt ROS otherwise negative.    VITALS  Vital Signs Last 24 Hrs  T(C): 36.6 (20 Oct 2023 14:40), Max: 36.7 (19 Oct 2023 21:08)  T(F): 97.8 (20 Oct 2023 14:40), Max: 98 (19 Oct 2023 21:08)  HR: 67 (20 Oct 2023 14:40) (67 - 77)  BP: 155/70 (20 Oct 2023 14:40) (138/65 - 171/70)  BP(mean): 108 (20 Oct 2023 10:35) (100 - 108)  RR: 18 (20 Oct 2023 14:40) (17 - 18)  SpO2: 97% (20 Oct 2023 14:40) (93% - 99%)    Parameters below as of 20 Oct 2023 14:40  Patient On (Oxygen Delivery Method): room air        CAPILLARY BLOOD GLUCOSE      POCT Blood Glucose.: 147 mg/dL (20 Oct 2023 12:42)  POCT Blood Glucose.: 128 mg/dL (20 Oct 2023 06:42)  POCT Blood Glucose.: 247 mg/dL (19 Oct 2023 21:42)  POCT Blood Glucose.: 243 mg/dL (19 Oct 2023 20:19)      PHYSICAL EXAM  General: NAD, A&Ox3      MEDICATIONS  (STANDING):  amLODIPine   Tablet 10 milliGRAM(s) Oral daily  aspirin enteric coated 81 milliGRAM(s) Oral daily  atorvastatin 80 milliGRAM(s) Oral at bedtime  carvedilol 25 milliGRAM(s) Oral every 12 hours  cinacalcet 60 milliGRAM(s) Oral daily  clopidogrel Tablet 75 milliGRAM(s) Oral daily  dextrose 5%. 1000 milliLiter(s) (50 mL/Hr) IV Continuous <Continuous>  dextrose 5%. 1000 milliLiter(s) (100 mL/Hr) IV Continuous <Continuous>  dextrose 50% Injectable 25 Gram(s) IV Push once  dextrose 50% Injectable 25 Gram(s) IV Push once  dextrose 50% Injectable 12.5 Gram(s) IV Push once  gabapentin 100 milliGRAM(s) Oral <User Schedule>  glucagon  Injectable 1 milliGRAM(s) IntraMuscular once  heparin   Injectable 5000 Unit(s) SubCutaneous every 8 hours  insulin glargine Injectable (LANTUS) 5 Unit(s) SubCutaneous at bedtime  insulin lispro (ADMELOG) corrective regimen sliding scale   SubCutaneous three times a day before meals  insulin lispro (ADMELOG) corrective regimen sliding scale   SubCutaneous at bedtime  montelukast 10 milliGRAM(s) Oral daily  nitroglycerin     SubLingual 0.4 milliGRAM(s) SubLingual once  pantoprazole    Tablet 40 milliGRAM(s) Oral before breakfast  polyethylene glycol 3350 17 Gram(s) Oral daily  senna 1 Tablet(s) Oral daily  simethicone 80 milliGRAM(s) Chew two times a day    MEDICATIONS  (PRN):  acetaminophen     Tablet .. 650 milliGRAM(s) Oral every 6 hours PRN Temp greater or equal to 38C (100.4F), Mild Pain (1 - 3), Moderate Pain (4 - 6)  benzocaine/menthol Lozenge 1 Lozenge Oral two times a day PRN Sore Throat  dextrose Oral Gel 15 Gram(s) Oral once PRN Blood Glucose LESS THAN 70 milliGRAM(s)/deciliter  guaiFENesin Oral Liquid (Sugar-Free) 100 milliGRAM(s) Oral every 6 hours PRN Cough      No Known Allergies      LABS                        9.9    5.10  )-----------( 283      ( 20 Oct 2023 07:22 )             34.2     10-20    138  |  98  |  70<H>  ----------------------------<  118<H>  4.3   |  23  |  10.20<H>    Ca    7.9<L>      20 Oct 2023 07:22  Mg     2.1     10-20    TPro  5.9<L>  /  Alb  2.7<L>  /  TBili  0.2  /  DBili  x   /  AST  25  /  ALT  20  /  AlkPhos  142<H>  10-20      Urinalysis Basic - ( 20 Oct 2023 07:22 )    Color: x / Appearance: x / SG: x / pH: x  Gluc: 118 mg/dL / Ketone: x  / Bili: x / Urobili: x   Blood: x / Protein: x / Nitrite: x   Leuk Esterase: x / RBC: x / WBC x   Sq Epi: x / Non Sq Epi: x / Bacteria: x              IMAGING/EKG/ETC

## 2023-10-20 NOTE — PROGRESS NOTE ADULT - ASSESSMENT
This is a 73F with PMHX of CAD s/p PCI (2017) and recent T1 NSTEMI (s/p recent cardiac cath), ESRD on HD (via LUE AVF), HTN, T2DM, gout, fibroids who presents with a chief complaint of NSTEMI. Medicine consulted for COVID.     #COVID-19   PCR+ on 10/12, reports sore throat. Afebrile. WBC WNL. On RA 96%. No reported history of asthma/copd. Vaccinated with "multiple boosters" per patient (J&J).  Unable to receive paxlovid given CrCl <30. Does not qualify for dexamethasone given not on mechanical ventilation.   - s/p Remdesivir x5 day course (ended 10/17)  - Supportive care:  - Standing tylenol 650mg q6hrs for fever  - C/w Robitussin-DM every 8 hours  - C/w Montelukast 10mg qd  - Cepacol lozenges for sore throat  - DVT PPX while inpatient  - Patient can receive influenza vaccination anytime prior to d/c.     #T2DM (A1c 5.1% 10/2023)  - increase lantus to 7U nightly   - FS q6hrs  - C/w mISS      Dispo: Pending shelter     Med consult will continue to follow.  Thank you for this consult. Please page [phone] for any questions.  Recommendations not final until attestation signed by attending.

## 2023-10-20 NOTE — PROGRESS NOTE ADULT - PROBLEM SELECTOR PLAN 7
- Fluids: not indicated   - Replete Lytes to K>4, Mg>2 PRN   - Diet: DASH   - GI ppx: PPI   - PT Eval: home PT  - Dispo/SW: Awaiting shelter placement w/dialysis acceptance; course delayed at present due to no accepting facilities i/s/o Covid+.  SW continuing to evaluate.   - CODE: FULL code, see [facility] 10/17/23    Case discussed w/ Dr Alatorre

## 2023-10-20 NOTE — PROGRESS NOTE ADULT - PROBLEM SELECTOR PLAN 4
- c/o sore throat and low grade temp on 10/12. Found to be COVID+ 10/12/23  - Continue Montelukast 10mg QD and supportive management  - s/p remdesivir taper- completed on 10/17/23  - As per epidemiology will require isolation until [place] 10/22/23.     ##Flat Affect  #RESOLVED  - Holistic RN ordered

## 2023-10-20 NOTE — PROGRESS NOTE ADULT - SUBJECTIVE AND OBJECTIVE BOX
seen and evaluated again while on dialysis to assess hemodynamic stability  feels okay  BP good- keeping UF target at 1L

## 2023-10-20 NOTE — PROGRESS NOTE ADULT - SUBJECTIVE AND OBJECTIVE BOX
**incomplete**   Interventional Cardiology PA Adult Progress Note    C.C.:     Subjective Assessment:      ROS Negative except as per Subjective and HPI  	  MEDICATIONS:  amLODIPine   Tablet 10 milliGRAM(s) Oral daily  carvedilol 12.5 milliGRAM(s) Oral every 12 hours  nitroglycerin     SubLingual 0.4 milliGRAM(s) SubLingual once  guaiFENesin Oral Liquid (Sugar-Free) 100 milliGRAM(s) Oral every 6 hours PRN  montelukast 10 milliGRAM(s) Oral daily  acetaminophen     Tablet .. 650 milliGRAM(s) Oral every 6 hours PRN  gabapentin 100 milliGRAM(s) Oral <User Schedule>  pantoprazole    Tablet 40 milliGRAM(s) Oral before breakfast  polyethylene glycol 3350 17 Gram(s) Oral daily  senna 1 Tablet(s) Oral daily  simethicone 80 milliGRAM(s) Chew two times a day  atorvastatin 80 milliGRAM(s) Oral at bedtime  cinacalcet 60 milliGRAM(s) Oral daily  dextrose 50% Injectable 25 Gram(s) IV Push once  dextrose 50% Injectable 25 Gram(s) IV Push once  dextrose 50% Injectable 12.5 Gram(s) IV Push once  dextrose Oral Gel 15 Gram(s) Oral once PRN  glucagon  Injectable 1 milliGRAM(s) IntraMuscular once  insulin glargine Injectable (LANTUS) 5 Unit(s) SubCutaneous at bedtime  insulin lispro (ADMELOG) corrective regimen sliding scale   SubCutaneous three times a day before meals  insulin lispro (ADMELOG) corrective regimen sliding scale   SubCutaneous at bedtime    aspirin enteric coated 81 milliGRAM(s) Oral daily  benzocaine/menthol Lozenge 1 Lozenge Oral two times a day PRN  clopidogrel Tablet 75 milliGRAM(s) Oral daily  dextrose 5%. 1000 milliLiter(s) IV Continuous <Continuous>  dextrose 5%. 1000 milliLiter(s) IV Continuous <Continuous>  heparin   Injectable 5000 Unit(s) SubCutaneous every 8 hours      	    [PHYSICAL EXAM:  TELEMETRY:  T(C): 36 (10-20-23 @ 05:25), Max: 36.9 (10-19-23 @ 11:39)  HR: 72 (10-20-23 @ 05:25) (68 - 75)  BP: 150/67 (10-20-23 @ 05:25) (113/57 - 150/70)  RR: 17 (10-20-23 @ 05:25) (17 - 17)  SpO2: 99% (10-20-23 @ 05:25) (93% - 99%)  Wt(kg): --  I&O's Summary    19 Oct 2023 07:01  -  20 Oct 2023 07:00  --------------------------------------------------------  IN: 0 mL / OUT: 0 mL / NET: 0 mL        Turcios:  Central/PICC/Mid Line:                                         Appearance: Normal	  HEENT:   Normal oral mucosa, PERRL, EOMI	  Neck: Supple, + JVD/ - JVD; Carotid Bruit   Cardiovascular: Normal S1 S2, No JVD, No murmurs,   Respiratory: Lungs clear to auscultation/Decreased Breath Sounds/No Rales, Rhonchi, Wheezing	  Gastrointestinal:  Soft, Non-tender, + BS	  Skin: No rashes, No ecchymoses, No cyanosis  Extremities: Normal range of motion, No clubbing, cyanosis or edema  Vascular: Peripheral pulses palpable 2+ bilaterally  Neurologic: Non-focal  Psychiatry: A & O x 3, Mood & affect appropriate      	    ECG:  	  RADIOLOGY:   DIAGNOSTIC TESTING:  [ ] Echocardiogram:  [ ]  Catheterization:  [ ] Stress Test:    [ ] SLADE  OTHER: 	    LABS:	 	  CARDIAC MARKERS:                      9.9    5.10  )-----------( 283      ( 20 Oct 2023 07:22 )             34.2     10-20    138  |  98  |  x   ----------------------------<  x   4.3   |  x   |  x     Ca    8.3<L>      19 Oct 2023 07:51  Mg     2.1     10-20    TPro  5.9<L>  /  Alb  2.8<L>  /  TBili  0.2  /  DBili  x   /  AST  22  /  ALT  15  /  AlkPhos  111  10-19    proBNP:   Lipid Profile:   HgA1c:   TSH:       ASSESSMENT/PLAN: 	        DVT ppx:  Dispo:     Interventional Cardiology PA Adult Progress Note      Subjective Assessment:  Patient seen and examined at bedside. Patient denies CP, SOB, palpitations, abdominal pain, N/V, LE edema, hematuria, melena, or BRBPR.   Patient medically stable awaiting dc to shelter after COVID isolation completed.     	  MEDICATIONS:  amLODIPine   Tablet 10 milliGRAM(s) Oral daily  carvedilol 12.5 milliGRAM(s) Oral every 12 hours  nitroglycerin     SubLingual 0.4 milliGRAM(s) SubLingual once  guaiFENesin Oral Liquid (Sugar-Free) 100 milliGRAM(s) Oral every 6 hours PRN  montelukast 10 milliGRAM(s) Oral daily  acetaminophen     Tablet .. 650 milliGRAM(s) Oral every 6 hours PRN  gabapentin 100 milliGRAM(s) Oral <User Schedule>  pantoprazole    Tablet 40 milliGRAM(s) Oral before breakfast  polyethylene glycol 3350 17 Gram(s) Oral daily  senna 1 Tablet(s) Oral daily  simethicone 80 milliGRAM(s) Chew two times a day  atorvastatin 80 milliGRAM(s) Oral at bedtime  cinacalcet 60 milliGRAM(s) Oral daily  dextrose 50% Injectable 25 Gram(s) IV Push once  dextrose 50% Injectable 25 Gram(s) IV Push once  dextrose 50% Injectable 12.5 Gram(s) IV Push once  dextrose Oral Gel 15 Gram(s) Oral once PRN  glucagon  Injectable 1 milliGRAM(s) IntraMuscular once  insulin glargine Injectable (LANTUS) 5 Unit(s) SubCutaneous at bedtime  insulin lispro (ADMELOG) corrective regimen sliding scale   SubCutaneous three times a day before meals  insulin lispro (ADMELOG) corrective regimen sliding scale   SubCutaneous at bedtime  aspirin enteric coated 81 milliGRAM(s) Oral daily  benzocaine/menthol Lozenge 1 Lozenge Oral two times a day PRN  clopidogrel Tablet 75 milliGRAM(s) Oral daily  dextrose 5%. 1000 milliLiter(s) IV Continuous <Continuous>  dextrose 5%. 1000 milliLiter(s) IV Continuous <Continuous>  heparin   Injectable 5000 Unit(s) SubCutaneous every 8 hours      	    [PHYSICAL EXAM:  TELEMETRY:  T(C): 36 (10-20-23 @ 05:25), Max: 36.9 (10-19-23 @ 11:39)  HR: 72 (10-20-23 @ 05:25) (68 - 75)  BP: 150/67 (10-20-23 @ 05:25) (113/57 - 150/70)  RR: 17 (10-20-23 @ 05:25) (17 - 17)  SpO2: 99% (10-20-23 @ 05:25) (93% - 99%)  Wt(kg): --  I&O's Summary    19 Oct 2023 07:01  -  20 Oct 2023 07:00  --------------------------------------------------------  IN: 0 mL / OUT: 0 mL / NET: 0 mL                                     Appearance: normal resting comfortably in bed 	  Cardiovascular: Normal S1 S2  Respiratory: Lungs clear to auscultation   Gastrointestinal:  Soft, Non-tender, + BS	  Skin: No rashes, No ecchymoses, No cyanosis  Extremities: Normal range of motion,   Vascular: Warm, LE edema   Neurologic: Non-focal  Psychiatry: A & O x 3, Mood & affect appropriate      	    ECG:  	  RADIOLOGY:   DIAGNOSTIC TESTING:  [ ] Echocardiogram:  [ ]  Catheterization:  [ ] Stress Test:    [ ] SLADE  OTHER: 	    LABS:	 	  CARDIAC MARKERS:                      9.9    5.10  )-----------( 283      ( 20 Oct 2023 07:22 )             34.2     10-20    138  |  98  |  x   ----------------------------<  x   4.3   |  x   |  x     Ca    8.3<L>      19 Oct 2023 07:51  Mg     2.1     10-20    TPro  5.9<L>  /  Alb  2.8<L>  /  TBili  0.2  /  DBili  x   /  AST  22  /  ALT  15  /  AlkPhos  111  10-19    proBNP:   Lipid Profile:   HgA1c:   TSH:       ASSESSMENT/PLAN: 	        DVT ppx:  Dispo:     Interventional Cardiology PA Adult Progress Note      Subjective Assessment:  Patient seen and examined at bedside. Patient denies CP, SOB, palpitations, abdominal pain, N/V, LE edema, hematuria, melena, or BRBPR.   Patient medically stable awaiting dc to shelter after COVID isolation complete on 10/22/23.     	  MEDICATIONS:  amLODIPine   Tablet 10 milliGRAM(s) Oral daily  carvedilol 12.5 milliGRAM(s) Oral every 12 hours  nitroglycerin     SubLingual 0.4 milliGRAM(s) SubLingual once  guaiFENesin Oral Liquid (Sugar-Free) 100 milliGRAM(s) Oral every 6 hours PRN  montelukast 10 milliGRAM(s) Oral daily  acetaminophen     Tablet .. 650 milliGRAM(s) Oral every 6 hours PRN  gabapentin 100 milliGRAM(s) Oral <User Schedule>  pantoprazole    Tablet 40 milliGRAM(s) Oral before breakfast  polyethylene glycol 3350 17 Gram(s) Oral daily  senna 1 Tablet(s) Oral daily  simethicone 80 milliGRAM(s) Chew two times a day  atorvastatin 80 milliGRAM(s) Oral at bedtime  cinacalcet 60 milliGRAM(s) Oral daily  dextrose 50% Injectable 25 Gram(s) IV Push once  dextrose 50% Injectable 25 Gram(s) IV Push once  dextrose 50% Injectable 12.5 Gram(s) IV Push once  dextrose Oral Gel 15 Gram(s) Oral once PRN  glucagon  Injectable 1 milliGRAM(s) IntraMuscular once  insulin glargine Injectable (LANTUS) 5 Unit(s) SubCutaneous at bedtime  insulin lispro (ADMELOG) corrective regimen sliding scale   SubCutaneous three times a day before meals  insulin lispro (ADMELOG) corrective regimen sliding scale   SubCutaneous at bedtime  aspirin enteric coated 81 milliGRAM(s) Oral daily  benzocaine/menthol Lozenge 1 Lozenge Oral two times a day PRN  clopidogrel Tablet 75 milliGRAM(s) Oral daily  dextrose 5%. 1000 milliLiter(s) IV Continuous <Continuous>  dextrose 5%. 1000 milliLiter(s) IV Continuous <Continuous>  heparin   Injectable 5000 Unit(s) SubCutaneous every 8 hours      	    [PHYSICAL EXAM:  TELEMETRY:  T(C): 36 (10-20-23 @ 05:25), Max: 36.9 (10-19-23 @ 11:39)  HR: 72 (10-20-23 @ 05:25) (68 - 75)  BP: 150/67 (10-20-23 @ 05:25) (113/57 - 150/70)  RR: 17 (10-20-23 @ 05:25) (17 - 17)  SpO2: 99% (10-20-23 @ 05:25) (93% - 99%)  Wt(kg): --  I&O's Summary    19 Oct 2023 07:01  -  20 Oct 2023 07:00  --------------------------------------------------------  IN: 0 mL / OUT: 0 mL / NET: 0 mL                                     Appearance: normal resting comfortably in bed 	  Cardiovascular: Normal S1 S2  Respiratory: Lungs clear to auscultation   Gastrointestinal:  Soft, Non-tender, + BS	  Skin: No rashes, No ecchymoses, No cyanosis  Extremities: Normal range of motion,   Vascular: Warm, no LE edema   Neurologic: Non-focal  Psychiatry: A & O x 3, Mood & affect appropriate      	    ECG:  	  RADIOLOGY:   DIAGNOSTIC TESTING:  [ ] Echocardiogram:  [ ]  Catheterization:  [ ] Stress Test:    [ ] SLADE  OTHER: 	    LABS:	 	  CARDIAC MARKERS:                      9.9    5.10  )-----------( 283      ( 20 Oct 2023 07:22 )             34.2     10-20    138  |  98  |  x   ----------------------------<  x   4.3   |  x   |  x     Ca    8.3<L>      19 Oct 2023 07:51  Mg     2.1     10-20    TPro  5.9<L>  /  Alb  2.8<L>  /  TBili  0.2  /  DBili  x   /  AST  22  /  ALT  15  /  AlkPhos  111  10-19    proBNP:   Lipid Profile:   HgA1c:   TSH:       ASSESSMENT/PLAN:

## 2023-10-20 NOTE — PROGRESS NOTE ADULT - PROBLEM SELECTOR PLAN 2
- HD via LUE AVF ([facility]). s/p HD 10/18, next plan for 10/20/23  - Continue Cinacalcet 60mg QD   - Renal following, appreciate recs

## 2023-10-20 NOTE — PROGRESS NOTE ADULT - ASSESSMENT
73-year-old female with hx of HTN, HLD, DM2, 3vCAD with mLAD 75%, pLCx 70%, and tortuous pRCA 90%, and ESRD on dialysis MWF presenting as a transfer from [facility] for NSTEMI and possible staged PCI. Now s/p LHC with p-mRCA 90% ISR s/p Rota/JAMIL x1 and residual LAD 75%. Plan for Staged PCI for residual LAD lesion in 5 weeks. Course complicated by COVID infection (day 0 10/12/2023); now pending completion of COVID isolation plan for discharge to shelter on 10/22/23.

## 2023-10-20 NOTE — PROGRESS NOTE ADULT - PROBLEM SELECTOR PLAN 1
- DAPT: ASA 81mg + Plavix 75mg   - Continue Atorvastatin 80mg QHS   - [facility] ([facility] [place]): 3vCAD with mLAD 75%, pLCx 70%, and tortous pRCA 90%; s/p Heparin gtt for ACS coverage   - [facility] (10/10/23): Rota/JAMIL x1 to p-mRCA (90% ISR), LAD 75%. Plan for Staged PCI of residual LAD lesion in 5 weeks   - [facility]TE (10/01/23): LV EF 60-65%, LVH, normal wall motion. G1DD. Trace MR. Aortic valve mildly calcified  - Patient experienced 1 episode of angina 10/13 while at rest, self resolved; EKG with TWI not seen previously on EKG from 10/11. Given known residual disease, will consider performing staged intervention during this admission if symptoms continue to occur--> No further chest pain reported

## 2023-10-21 LAB
ANION GAP SERPL CALC-SCNC: 14 MMOL/L — SIGNIFICANT CHANGE UP (ref 5–17)
ANION GAP SERPL CALC-SCNC: 14 MMOL/L — SIGNIFICANT CHANGE UP (ref 5–17)
BUN SERPL-MCNC: 42 MG/DL — HIGH (ref 7–23)
BUN SERPL-MCNC: 42 MG/DL — HIGH (ref 7–23)
CALCIUM SERPL-MCNC: 7.9 MG/DL — LOW (ref 8.4–10.5)
CALCIUM SERPL-MCNC: 7.9 MG/DL — LOW (ref 8.4–10.5)
CHLORIDE SERPL-SCNC: 96 MMOL/L — SIGNIFICANT CHANGE UP (ref 96–108)
CHLORIDE SERPL-SCNC: 96 MMOL/L — SIGNIFICANT CHANGE UP (ref 96–108)
CO2 SERPL-SCNC: 25 MMOL/L — SIGNIFICANT CHANGE UP (ref 22–31)
CO2 SERPL-SCNC: 25 MMOL/L — SIGNIFICANT CHANGE UP (ref 22–31)
CREAT SERPL-MCNC: 6.74 MG/DL — HIGH (ref 0.5–1.3)
CREAT SERPL-MCNC: 6.74 MG/DL — HIGH (ref 0.5–1.3)
EGFR: 6 ML/MIN/1.73M2 — LOW
EGFR: 6 ML/MIN/1.73M2 — LOW
GLUCOSE BLDC GLUCOMTR-MCNC: 137 MG/DL — HIGH (ref 70–99)
GLUCOSE BLDC GLUCOMTR-MCNC: 137 MG/DL — HIGH (ref 70–99)
GLUCOSE BLDC GLUCOMTR-MCNC: 161 MG/DL — HIGH (ref 70–99)
GLUCOSE BLDC GLUCOMTR-MCNC: 161 MG/DL — HIGH (ref 70–99)
GLUCOSE BLDC GLUCOMTR-MCNC: 260 MG/DL — HIGH (ref 70–99)
GLUCOSE BLDC GLUCOMTR-MCNC: 260 MG/DL — HIGH (ref 70–99)
GLUCOSE SERPL-MCNC: 200 MG/DL — HIGH (ref 70–99)
GLUCOSE SERPL-MCNC: 200 MG/DL — HIGH (ref 70–99)
HCT VFR BLD CALC: 33.7 % — LOW (ref 34.5–45)
HCT VFR BLD CALC: 33.7 % — LOW (ref 34.5–45)
HGB BLD-MCNC: 9.8 G/DL — LOW (ref 11.5–15.5)
HGB BLD-MCNC: 9.8 G/DL — LOW (ref 11.5–15.5)
MAGNESIUM SERPL-MCNC: 1.9 MG/DL — SIGNIFICANT CHANGE UP (ref 1.6–2.6)
MAGNESIUM SERPL-MCNC: 1.9 MG/DL — SIGNIFICANT CHANGE UP (ref 1.6–2.6)
MCHC RBC-ENTMCNC: 29.1 GM/DL — LOW (ref 32–36)
MCHC RBC-ENTMCNC: 29.1 GM/DL — LOW (ref 32–36)
MCHC RBC-ENTMCNC: 29.3 PG — SIGNIFICANT CHANGE UP (ref 27–34)
MCHC RBC-ENTMCNC: 29.3 PG — SIGNIFICANT CHANGE UP (ref 27–34)
MCV RBC AUTO: 100.9 FL — HIGH (ref 80–100)
MCV RBC AUTO: 100.9 FL — HIGH (ref 80–100)
NRBC # BLD: 0 /100 WBCS — SIGNIFICANT CHANGE UP (ref 0–0)
NRBC # BLD: 0 /100 WBCS — SIGNIFICANT CHANGE UP (ref 0–0)
PLATELET # BLD AUTO: 301 K/UL — SIGNIFICANT CHANGE UP (ref 150–400)
PLATELET # BLD AUTO: 301 K/UL — SIGNIFICANT CHANGE UP (ref 150–400)
POTASSIUM SERPL-MCNC: 4.2 MMOL/L — SIGNIFICANT CHANGE UP (ref 3.5–5.3)
POTASSIUM SERPL-MCNC: 4.2 MMOL/L — SIGNIFICANT CHANGE UP (ref 3.5–5.3)
POTASSIUM SERPL-SCNC: 4.2 MMOL/L — SIGNIFICANT CHANGE UP (ref 3.5–5.3)
POTASSIUM SERPL-SCNC: 4.2 MMOL/L — SIGNIFICANT CHANGE UP (ref 3.5–5.3)
RBC # BLD: 3.34 M/UL — LOW (ref 3.8–5.2)
RBC # BLD: 3.34 M/UL — LOW (ref 3.8–5.2)
RBC # FLD: 16.2 % — HIGH (ref 10.3–14.5)
RBC # FLD: 16.2 % — HIGH (ref 10.3–14.5)
SODIUM SERPL-SCNC: 135 MMOL/L — SIGNIFICANT CHANGE UP (ref 135–145)
SODIUM SERPL-SCNC: 135 MMOL/L — SIGNIFICANT CHANGE UP (ref 135–145)
WBC # BLD: 4.69 K/UL — SIGNIFICANT CHANGE UP (ref 3.8–10.5)
WBC # BLD: 4.69 K/UL — SIGNIFICANT CHANGE UP (ref 3.8–10.5)
WBC # FLD AUTO: 4.69 K/UL — SIGNIFICANT CHANGE UP (ref 3.8–10.5)
WBC # FLD AUTO: 4.69 K/UL — SIGNIFICANT CHANGE UP (ref 3.8–10.5)

## 2023-10-21 PROCEDURE — 99232 SBSQ HOSP IP/OBS MODERATE 35: CPT | Mod: GC

## 2023-10-21 PROCEDURE — 99233 SBSQ HOSP IP/OBS HIGH 50: CPT

## 2023-10-21 RX ORDER — CARVEDILOL PHOSPHATE 80 MG/1
1 CAPSULE, EXTENDED RELEASE ORAL
Qty: 60 | Refills: 0
Start: 2023-10-21 | End: 2023-11-19

## 2023-10-21 RX ORDER — HYDRALAZINE HCL 50 MG
50 TABLET ORAL THREE TIMES A DAY
Refills: 0 | Status: DISCONTINUED | OUTPATIENT
Start: 2023-10-21 | End: 2023-10-22

## 2023-10-21 RX ORDER — AMLODIPINE BESYLATE 2.5 MG/1
1 TABLET ORAL
Qty: 30 | Refills: 0
Start: 2023-10-21 | End: 2023-11-19

## 2023-10-21 RX ORDER — ATORVASTATIN CALCIUM 80 MG/1
1 TABLET, FILM COATED ORAL
Qty: 30 | Refills: 0
Start: 2023-10-21 | End: 2023-11-19

## 2023-10-21 RX ORDER — ASPIRIN/CALCIUM CARB/MAGNESIUM 324 MG
1 TABLET ORAL
Qty: 30 | Refills: 0
Start: 2023-10-21 | End: 2023-11-19

## 2023-10-21 RX ORDER — CLOPIDOGREL BISULFATE 75 MG/1
1 TABLET, FILM COATED ORAL
Qty: 30 | Refills: 0
Start: 2023-10-21 | End: 2023-11-19

## 2023-10-21 RX ADMIN — CARVEDILOL PHOSPHATE 25 MILLIGRAM(S): 80 CAPSULE, EXTENDED RELEASE ORAL at 06:33

## 2023-10-21 RX ADMIN — SIMETHICONE 80 MILLIGRAM(S): 80 TABLET, CHEWABLE ORAL at 06:33

## 2023-10-21 RX ADMIN — AMLODIPINE BESYLATE 10 MILLIGRAM(S): 2.5 TABLET ORAL at 06:33

## 2023-10-21 RX ADMIN — PANTOPRAZOLE SODIUM 40 MILLIGRAM(S): 20 TABLET, DELAYED RELEASE ORAL at 06:33

## 2023-10-21 RX ADMIN — CINACALCET 60 MILLIGRAM(S): 30 TABLET, FILM COATED ORAL at 13:11

## 2023-10-21 RX ADMIN — HEPARIN SODIUM 5000 UNIT(S): 5000 INJECTION INTRAVENOUS; SUBCUTANEOUS at 13:09

## 2023-10-21 RX ADMIN — HEPARIN SODIUM 5000 UNIT(S): 5000 INJECTION INTRAVENOUS; SUBCUTANEOUS at 22:02

## 2023-10-21 RX ADMIN — CLOPIDOGREL BISULFATE 75 MILLIGRAM(S): 75 TABLET, FILM COATED ORAL at 13:09

## 2023-10-21 RX ADMIN — MONTELUKAST 10 MILLIGRAM(S): 4 TABLET, CHEWABLE ORAL at 13:09

## 2023-10-21 RX ADMIN — Medication 81 MILLIGRAM(S): at 13:09

## 2023-10-21 RX ADMIN — Medication 1: at 22:01

## 2023-10-21 RX ADMIN — Medication 1: at 13:06

## 2023-10-21 RX ADMIN — ATORVASTATIN CALCIUM 80 MILLIGRAM(S): 80 TABLET, FILM COATED ORAL at 22:02

## 2023-10-21 RX ADMIN — HEPARIN SODIUM 5000 UNIT(S): 5000 INJECTION INTRAVENOUS; SUBCUTANEOUS at 06:33

## 2023-10-21 RX ADMIN — SENNA PLUS 1 TABLET(S): 8.6 TABLET ORAL at 13:11

## 2023-10-21 RX ADMIN — CARVEDILOL PHOSPHATE 25 MILLIGRAM(S): 80 CAPSULE, EXTENDED RELEASE ORAL at 18:01

## 2023-10-21 RX ADMIN — Medication 50 MILLIGRAM(S): at 22:02

## 2023-10-21 RX ADMIN — SIMETHICONE 80 MILLIGRAM(S): 80 TABLET, CHEWABLE ORAL at 18:00

## 2023-10-21 RX ADMIN — INSULIN GLARGINE 5 UNIT(S): 100 INJECTION, SOLUTION SUBCUTANEOUS at 22:01

## 2023-10-21 NOTE — PROGRESS NOTE ADULT - PROBLEM SELECTOR PLAN 1
- DAPT: ASA 81mg + Plavix 75mg   - Continue Atorvastatin 80mg QHS   - [facility] ([facility]): 3vCAD with mLAD 75%, pLCx 70%, and tortous pRCA 90%; s/p Heparin gtt for ACS coverage   - [facility] (10/10/23): Rota/JAMIL x1 to p-mRCA (90% ISR), LAD 75%. Plan for Staged PCI of residual LAD lesion in 5 weeks   - [facility]TE (10/01/23): LV EF 60-65%, LVH, normal wall motion. G1DD. Trace MR. Aortic valve mildly calcified  - Patient experienced 1 episode of angina 10/13 while at rest, self resolved; EKG with TWI not seen previously on EKG from 10/11. Given known residual disease, will consider performing staged intervention during this admission if symptoms continue to occur--> No further chest pain reported

## 2023-10-21 NOTE — PROGRESS NOTE ADULT - PROBLEM/PLAN-4
DISPLAY PLAN FREE TEXT
N/A

## 2023-10-21 NOTE — PROGRESS NOTE ADULT - SUBJECTIVE AND OBJECTIVE BOX
**incomplete**   Interventional Cardiology PA Adult Progress Note        Subjective Assessment:      	  MEDICATIONS:  amLODIPine   Tablet 10 milliGRAM(s) Oral daily  carvedilol 25 milliGRAM(s) Oral every 12 hours  nitroglycerin     SubLingual 0.4 milliGRAM(s) SubLingual once  guaiFENesin Oral Liquid (Sugar-Free) 100 milliGRAM(s) Oral every 6 hours PRN  montelukast 10 milliGRAM(s) Oral daily  acetaminophen     Tablet .. 650 milliGRAM(s) Oral every 6 hours PRN  gabapentin 100 milliGRAM(s) Oral <User Schedule>  pantoprazole    Tablet 40 milliGRAM(s) Oral before breakfast  polyethylene glycol 3350 17 Gram(s) Oral daily  senna 1 Tablet(s) Oral daily  simethicone 80 milliGRAM(s) Chew two times a day  atorvastatin 80 milliGRAM(s) Oral at bedtime  cinacalcet 60 milliGRAM(s) Oral daily  dextrose 50% Injectable 12.5 Gram(s) IV Push once  dextrose 50% Injectable 25 Gram(s) IV Push once  dextrose 50% Injectable 25 Gram(s) IV Push once  dextrose Oral Gel 15 Gram(s) Oral once PRN  glucagon  Injectable 1 milliGRAM(s) IntraMuscular once  insulin glargine Injectable (LANTUS) 5 Unit(s) SubCutaneous at bedtime  insulin lispro (ADMELOG) corrective regimen sliding scale   SubCutaneous three times a day before meals  insulin lispro (ADMELOG) corrective regimen sliding scale   SubCutaneous at bedtime  aspirin enteric coated 81 milliGRAM(s) Oral daily  benzocaine/menthol Lozenge 1 Lozenge Oral two times a day PRN  clopidogrel Tablet 75 milliGRAM(s) Oral daily  dextrose 5%. 1000 milliLiter(s) IV Continuous <Continuous>  dextrose 5%. 1000 milliLiter(s) IV Continuous <Continuous>  heparin   Injectable 5000 Unit(s) SubCutaneous every 8 hours      	    [PHYSICAL EXAM:TELEMETRY:  T(C): 36.4 (10-21-23 @ 06:28), Max: 36.7 (10-20-23 @ 21:00)  HR: 58 (10-21-23 @ 06:28) (58 - 90)  BP: 165/69 (10-21-23 @ 06:28) (44/63 - 171/70)  RR: 16 (10-21-23 @ 06:28) (16 - 18)  SpO2: 96% (10-21-23 @ 06:28) (96% - 97%)  Wt(kg): --  I&O's Summary    20 Oct 2023 07:01  -  21 Oct 2023 07:00  --------------------------------------------------------  IN: 180 mL / OUT: 1000 mL / NET: -820 mL                                        Appearance: Normal	  HEENT:   Normal oral mucosa, PERRL, EOMI	  Neck: Supple, + JVD/ - JVD; Carotid Bruit   Cardiovascular: Normal S1 S2, No JVD, No murmurs,   Respiratory: Lungs clear to auscultation/Decreased Breath Sounds/No Rales, Rhonchi, Wheezing	  Gastrointestinal:  Soft, Non-tender, + BS	  Skin: No rashes, No ecchymoses, No cyanosis  Extremities: Normal range of motion, No clubbing, cyanosis or edema  Vascular: Peripheral pulses palpable 2+ bilaterally  Neurologic: Non-focal  Psychiatry: A & O x 3, Mood & affect appropriate      	    ECG:  	  RADIOLOGY:   DIAGNOSTIC TESTING:  [ ] Echocardiogram:  [ ]  Catheterization:  [ ] Stress Test:    [ ] SLADE  OTHER: 	    LABS:	 	  CARDIAC MARKERS:                        9.8    4.69  )-----------( 301      ( 21 Oct 2023 05:30 )             33.7     10-21    135  |  96  |  42<H>  ----------------------------<  200<H>  4.2   |  25  |  6.74<H>    Ca    7.9<L>      21 Oct 2023 05:30  Mg     1.9     10-21    TPro  5.9<L>  /  Alb  2.7<L>  /  TBili  0.2  /  DBili  x   /  AST  25  /  ALT  20  /  AlkPhos  142<H>  10-20    proBNP:   Lipid Profile:   HgA1c:   TSH:       ASSESSMENT/PLAN: 	       Interventional Cardiology PA Adult Progress Note    Subjective Assessment:  Patient seen and examined at bedside this morning. Denies CP, SOB, palpitations, abd pain, N/V, melena, BRBPR, or LE edema.     MEDICATIONS:  amLODIPine   Tablet 10 milliGRAM(s) Oral daily  carvedilol 25 milliGRAM(s) Oral every 12 hours  nitroglycerin     SubLingual 0.4 milliGRAM(s) SubLingual once  guaiFENesin Oral Liquid (Sugar-Free) 100 milliGRAM(s) Oral every 6 hours PRN  montelukast 10 milliGRAM(s) Oral daily  acetaminophen     Tablet .. 650 milliGRAM(s) Oral every 6 hours PRN  gabapentin 100 milliGRAM(s) Oral <User Schedule>  pantoprazole    Tablet 40 milliGRAM(s) Oral before breakfast  polyethylene glycol 3350 17 Gram(s) Oral daily  senna 1 Tablet(s) Oral daily  simethicone 80 milliGRAM(s) Chew two times a day  atorvastatin 80 milliGRAM(s) Oral at bedtime  cinacalcet 60 milliGRAM(s) Oral daily  dextrose 50% Injectable 12.5 Gram(s) IV Push once  dextrose 50% Injectable 25 Gram(s) IV Push once  dextrose 50% Injectable 25 Gram(s) IV Push once  dextrose Oral Gel 15 Gram(s) Oral once PRN  glucagon  Injectable 1 milliGRAM(s) IntraMuscular once  insulin glargine Injectable (LANTUS) 5 Unit(s) SubCutaneous at bedtime  insulin lispro (ADMELOG) corrective regimen sliding scale   SubCutaneous three times a day before meals  insulin lispro (ADMELOG) corrective regimen sliding scale   SubCutaneous at bedtime  aspirin enteric coated 81 milliGRAM(s) Oral daily  benzocaine/menthol Lozenge 1 Lozenge Oral two times a day PRN  clopidogrel Tablet 75 milliGRAM(s) Oral daily  dextrose 5%. 1000 milliLiter(s) IV Continuous <Continuous>  dextrose 5%. 1000 milliLiter(s) IV Continuous <Continuous>  heparin   Injectable 5000 Unit(s) SubCutaneous every 8 hours      	    [PHYSICAL EXAM:TELEMETRY: T(C): 36.4 (10-21-23 @ 06:28), Max: 36.7 (10-20-23 @ 21:00)  HR: 58 (10-21-23 @ 06:28) (58 - 90)  BP: 165/69 (10-21-23 @ 06:28) (44/63 - 171/70)  RR: 16 (10-21-23 @ 06:28) (16 - 18)  SpO2: 96% (10-21-23 @ 06:28) (96% - 97%)  Wt(kg): --  I&O's Summary    20 Oct 2023 07:01  -  21 Oct 2023 07:00  --------------------------------------------------------  IN: 180 mL / OUT: 1000 mL / NET: -820 mL                                        Appearance: Normal	  Cardiovascular: Normal S1 S2   Respiratory: Lungs clear to auscultation  Gastrointestinal:  Soft, Non-tender, + BS	  Vascular: warm, no LE edema   Neurologic: Non-focal  Psychiatry: A & O x 3, Mood & affect appropriate        LABS:	 	  CARDIAC MARKERS:                        9.8    4.69  )-----------( 301      ( 21 Oct 2023 05:30 )             33.7     10-21    135  |  96  |  42<H>  ----------------------------<  200<H>  4.2   |  25  |  6.74<H>    Ca    7.9<L>      21 Oct 2023 05:30  Mg     1.9     10-21    TPro  5.9<L>  /  Alb  2.7<L>  /  TBili  0.2  /  DBili  x   /  AST  25  /  ALT  20  /  AlkPhos  142<H>  10-20    proBNP:   Lipid Profile:   HgA1c:   TSH:       ASSESSMENT/PLAN:

## 2023-10-21 NOTE — PROGRESS NOTE ADULT - PROBLEM SELECTOR PLAN 7
- Fluids: not indicated   - Replete Lytes to K>4, Mg>2 PRN   - Diet: DASH   - GI ppx: PPI   - PT Eval: home PT  - Dispo/SW: Awaiting shelter placement w/dialysis acceptance; course delayed at present due to no accepting facilities i/s/o Covid+.  SW continuing to evaluate.   - CODE: FULL code, see [facility] 10/17/23    Case discussed w/ Dr. Cr - Fluids: not indicated   - Replete Lytes to K>4, Mg>2 PRN   - Diet: DASH   - GI ppx: PPI   - PT Eval: home PT  - Dispo/SW: Awaiting shelter placement w/dialysis acceptance; plan for discharge on 10/22/23   - CODE: FULL code, see [facility] 10/17/23    Case discussed w/ Dr. Cr

## 2023-10-21 NOTE — PROGRESS NOTE ADULT - PROBLEM SELECTOR PLAN 3
- continue with amlodipine 10 mg QD; uptitrated coreg 6.25 mg BID to 12.5 mg BID  - SBP ranging 110s-150s  - hold BP prior to HD SBP 160s   - C/w Amlodipine 10 mg daily, Coreg 12.5mg BID  - Restarted home Hydralazine 50mg TID  - hold BP prior to HD

## 2023-10-21 NOTE — PROGRESS NOTE ADULT - PROBLEM SELECTOR PROBLEM 2
ESRD on dialysis

## 2023-10-21 NOTE — PROGRESS NOTE ADULT - ASSESSMENT
73F with PMHX of CAD s/p PCI (2017) and recent T1 NSTEMI (s/p recent cardiac cath), ESRD on HD (via LUE AVF), HTN, T2DM, gout, fibroids who presents with a chief complaint of NSTEMI. Medicine consulted for COVID.     #COVID-19   PCR+ on 10/12, reports sore throat. Afebrile. WBC WNL. On RA 96%. No reported history of asthma/copd. Vaccinated with "multiple boosters" per patient (J&J).  Unable to receive paxlovid given CrCl <30. Does not qualify for dexamethasone given not on mechanical ventilation.   - s/p Remdesivir x5 day course (ended 10/17)  - Supportive care:  - Standing tylenol 650mg q6hrs for fever  - C/w Robitussin-DM every 8 hours  - C/w Montelukast 10mg qd  - Cepacol lozenges for sore throat  - DVT PPX while inpatient  - Patient can receive influenza vaccination anytime prior to d/c.  - Airborne & Contact precautions, anticipate isolation until 10/22     #T2DM (A1c 5.1% 10/2023)  - on Lantus 5U nightly w/ FS mostly <190  - FS q6hrs  - C/w mISS      Dispo: Pending shelter     Med consult will continue to follow.  Thank you for this consult. Please page [phone] for any questions.  Recommendations not final until attestation signed by attending.   73F with PMHX of CAD s/p PCI (2017) and recent T1 NSTEMI (s/p recent cardiac cath), ESRD on HD (via LUE AVF), HTN, T2DM, gout, fibroids who presents with a chief complaint of NSTEMI. Medicine consulted for COVID.     #COVID-19   PCR+ on 10/12, reports sore throat. Afebrile. WBC WNL. On RA 96%. No reported history of asthma/copd. Vaccinated with "multiple boosters" per patient (J&J).  Unable to receive paxlovid given CrCl <30. Does not qualify for dexamethasone given not on mechanical ventilation.   - s/p Remdesivir x5 day course (ended 10/17)  - Supportive care:  - Standing tylenol 650mg q6hrs for fever  - C/w Robitussin-DM every 8 hours  - C/w Montelukast 10mg qd  - Cepacol lozenges for sore throat  - DVT PPX while inpatient  - Patient can receive influenza vaccination anytime prior to d/c.  - Airborne & Contact precautions, anticipate isolation until 10/22     #T2DM (A1c 5.1% 10/2023)  FS mostly <190 w/o needing ISS coverage  - c/w Lantus 5U qhs & mISS  - FS q6hrs        Dispo: Pending shelter     Med consult will continue to follow.  Thank you for this consult. Please page [phone] for any questions.  Recommendations not final until attestation signed by attending.

## 2023-10-21 NOTE — PROGRESS NOTE ADULT - PROBLEM SELECTOR PLAN 2
- HD via LUE AVF ([facility]). s/p HD 10/18, next plan for 10/20/23  - Continue Cinacalcet 60mg QD   - Renal following, appreciate recs - HD via LUE AVF (MWF). s/p HD 10/20   - Continue Cinacalcet 60mg QD   - Renal following, appreciate recs

## 2023-10-21 NOTE — PROGRESS NOTE ADULT - SUBJECTIVE AND OBJECTIVE BOX
OVERNIGHT EVENTS:    SUBJECTIVE:  Patient seen and examined at bedside.    Vital Signs Last 12 Hrs  T(F): 97.5 (10-21-23 @ 09:11), Max: 97.5 (10-21-23 @ 06:28)  HR: 68 (10-21-23 @ 09:11) (58 - 68)  BP: 143/64 (10-21-23 @ 09:11) (143/64 - 165/69)  BP(mean): 92 (10-21-23 @ 09:11) (92 - 101)  RR: 16 (10-21-23 @ 09:11) (16 - 16)  SpO2: 96% (10-21-23 @ 09:11) (96% - 96%)  I&O's Summary    20 Oct 2023 07:01  -  21 Oct 2023 07:00  --------------------------------------------------------  IN: 180 mL / OUT: 1000 mL / NET: -820 mL    21 Oct 2023 07:01  -  21 Oct 2023 10:58  --------------------------------------------------------  IN: 240 mL / OUT: 0 mL / NET: 240 mL        PHYSICAL EXAM:  Constitutional: NAD, comfortable in bed.  HEENT: NC/AT, PERRLA, EOMI, no conjunctival pallor or scleral icterus, MMM  Neck: Supple, no JVD  Respiratory: CTA B/L. No w/r/r.   Cardiovascular: RRR, normal S1 and S2, no m/r/g.   Gastrointestinal: +BS, soft NTND, no guarding or rebound tenderness, no palpable masses   Extremities: wwp; no cyanosis, clubbing or edema.   Vascular: Pulses equal and strong throughout.   Neurological: AAOx3, no CN deficits, strength and sensation intact throughout.   Skin: No gross skin abnormalities or rashes        LABS:                        9.8    4.69  )-----------( 301      ( 21 Oct 2023 05:30 )             33.7     10-21    135  |  96  |  42<H>  ----------------------------<  200<H>  4.2   |  25  |  6.74<H>    Ca    7.9<L>      21 Oct 2023 05:30  Mg     1.9     10-21    TPro  5.9<L>  /  Alb  2.7<L>  /  TBili  0.2  /  DBili  x   /  AST  25  /  ALT  20  /  AlkPhos  142<H>  10-20      Urinalysis Basic - ( 21 Oct 2023 05:30 )    Color: x / Appearance: x / SG: x / pH: x  Gluc: 200 mg/dL / Ketone: x  / Bili: x / Urobili: x   Blood: x / Protein: x / Nitrite: x   Leuk Esterase: x / RBC: x / WBC x   Sq Epi: x / Non Sq Epi: x / Bacteria: x          RADIOLOGY & ADDITIONAL TESTS:    MEDICATIONS  (STANDING):  amLODIPine   Tablet 10 milliGRAM(s) Oral daily  aspirin enteric coated 81 milliGRAM(s) Oral daily  atorvastatin 80 milliGRAM(s) Oral at bedtime  carvedilol 25 milliGRAM(s) Oral every 12 hours  cinacalcet 60 milliGRAM(s) Oral daily  clopidogrel Tablet 75 milliGRAM(s) Oral daily  dextrose 5%. 1000 milliLiter(s) (50 mL/Hr) IV Continuous <Continuous>  dextrose 5%. 1000 milliLiter(s) (100 mL/Hr) IV Continuous <Continuous>  dextrose 50% Injectable 25 Gram(s) IV Push once  dextrose 50% Injectable 25 Gram(s) IV Push once  dextrose 50% Injectable 12.5 Gram(s) IV Push once  gabapentin 100 milliGRAM(s) Oral <User Schedule>  glucagon  Injectable 1 milliGRAM(s) IntraMuscular once  heparin   Injectable 5000 Unit(s) SubCutaneous every 8 hours  insulin glargine Injectable (LANTUS) 5 Unit(s) SubCutaneous at bedtime  insulin lispro (ADMELOG) corrective regimen sliding scale   SubCutaneous at bedtime  insulin lispro (ADMELOG) corrective regimen sliding scale   SubCutaneous three times a day before meals  montelukast 10 milliGRAM(s) Oral daily  nitroglycerin     SubLingual 0.4 milliGRAM(s) SubLingual once  pantoprazole    Tablet 40 milliGRAM(s) Oral before breakfast  polyethylene glycol 3350 17 Gram(s) Oral daily  senna 1 Tablet(s) Oral daily  simethicone 80 milliGRAM(s) Chew two times a day    MEDICATIONS  (PRN):  acetaminophen     Tablet .. 650 milliGRAM(s) Oral every 6 hours PRN Temp greater or equal to 38C (100.4F), Mild Pain (1 - 3), Moderate Pain (4 - 6)  benzocaine/menthol Lozenge 1 Lozenge Oral two times a day PRN Sore Throat  dextrose Oral Gel 15 Gram(s) Oral once PRN Blood Glucose LESS THAN 70 milliGRAM(s)/deciliter  guaiFENesin Oral Liquid (Sugar-Free) 100 milliGRAM(s) Oral every 6 hours PRN Cough   OVERNIGHT EVENTS: None    SUBJECTIVE:  Patient seen and examined at bedside.  Endorsed feeling better., no complaints. Concerns about cardiac medications conveyed to primary team.    Vital Signs Last 12 Hrs  T(F): 97.5 (10-21-23 @ 09:11), Max: 97.5 (10-21-23 @ 06:28)  HR: 68 (10-21-23 @ 09:11) (58 - 68)  BP: 143/64 (10-21-23 @ 09:11) (143/64 - 165/69)  BP(mean): 92 (10-21-23 @ 09:11) (92 - 101)  RR: 16 (10-21-23 @ 09:11) (16 - 16)  SpO2: 96% (10-21-23 @ 09:11) (96% - 96%)  I&O's Summary    20 Oct 2023 07:01  -  21 Oct 2023 07:00  --------------------------------------------------------  IN: 180 mL / OUT: 1000 mL / NET: -820 mL    21 Oct 2023 07:01  -  21 Oct 2023 10:58  --------------------------------------------------------  IN: 240 mL / OUT: 0 mL / NET: 240 mL    PHYSICAL EXAM:  General: NAD, A&Ox3    LABS:                        9.8    4.69  )-----------( 301      ( 21 Oct 2023 05:30 )             33.7     10-21    135  |  96  |  42<H>  ----------------------------<  200<H>  4.2   |  25  |  6.74<H>    Ca    7.9<L>      21 Oct 2023 05:30  Mg     1.9     10-21    TPro  5.9<L>  /  Alb  2.7<L>  /  TBili  0.2  /  DBili  x   /  AST  25  /  ALT  20  /  AlkPhos  142<H>  10-20    Urinalysis Basic - ( 21 Oct 2023 05:30 )    Color: x / Appearance: x / SG: x / pH: x  Gluc: 200 mg/dL / Ketone: x  / Bili: x / Urobili: x   Blood: x / Protein: x / Nitrite: x   Leuk Esterase: x / RBC: x / WBC x   Sq Epi: x / Non Sq Epi: x / Bacteria: x    RADIOLOGY & ADDITIONAL TESTS:    MEDICATIONS  (STANDING):  amLODIPine   Tablet 10 milliGRAM(s) Oral daily  aspirin enteric coated 81 milliGRAM(s) Oral daily  atorvastatin 80 milliGRAM(s) Oral at bedtime  carvedilol 25 milliGRAM(s) Oral every 12 hours  cinacalcet 60 milliGRAM(s) Oral daily  clopidogrel Tablet 75 milliGRAM(s) Oral daily  dextrose 5%. 1000 milliLiter(s) (50 mL/Hr) IV Continuous <Continuous>  dextrose 5%. 1000 milliLiter(s) (100 mL/Hr) IV Continuous <Continuous>  dextrose 50% Injectable 25 Gram(s) IV Push once  dextrose 50% Injectable 25 Gram(s) IV Push once  dextrose 50% Injectable 12.5 Gram(s) IV Push once  gabapentin 100 milliGRAM(s) Oral <User Schedule>  glucagon  Injectable 1 milliGRAM(s) IntraMuscular once  heparin   Injectable 5000 Unit(s) SubCutaneous every 8 hours  insulin glargine Injectable (LANTUS) 5 Unit(s) SubCutaneous at bedtime  insulin lispro (ADMELOG) corrective regimen sliding scale   SubCutaneous at bedtime  insulin lispro (ADMELOG) corrective regimen sliding scale   SubCutaneous three times a day before meals  montelukast 10 milliGRAM(s) Oral daily  nitroglycerin     SubLingual 0.4 milliGRAM(s) SubLingual once  pantoprazole    Tablet 40 milliGRAM(s) Oral before breakfast  polyethylene glycol 3350 17 Gram(s) Oral daily  senna 1 Tablet(s) Oral daily  simethicone 80 milliGRAM(s) Chew two times a day    MEDICATIONS  (PRN):  acetaminophen     Tablet .. 650 milliGRAM(s) Oral every 6 hours PRN Temp greater or equal to 38C (100.4F), Mild Pain (1 - 3), Moderate Pain (4 - 6)  benzocaine/menthol Lozenge 1 Lozenge Oral two times a day PRN Sore Throat  dextrose Oral Gel 15 Gram(s) Oral once PRN Blood Glucose LESS THAN 70 milliGRAM(s)/deciliter  guaiFENesin Oral Liquid (Sugar-Free) 100 milliGRAM(s) Oral every 6 hours PRN Cough

## 2023-10-21 NOTE — PROGRESS NOTE ADULT - ATTENDING COMMENTS
74 yo woman with ESRD admitted for NSTEMI s/p cardiac cath. +COVID PNA  for HD today
Exam deferred due to Covid. Dialysis today. Cardiology followup for CAD.
I: HTN uncontrolled. Last dialyzed 3d ago.     A: ESRD. S/P Cath yesterday.   P: Continue BP meds. Dialysis today.
This is a 73F with PMHX of CAD s/p PCI (2017) and recent T1 NSTEMI (s/p recent cardiac cath), ESRD on HD (via LUE AVF), HTN, T2DM, gout, fibroids who presents with a chief complaint of NSTEMI- admitted 10/9-  Medicine consulted for COVID- positive on 10/12/23 ( cough )  event reviewed.    pt seen with Dr. Mackey.   no complaint , waiting to return to shelter   saturating well on room air.   RRR   moving good air on bilateral lung exam.     Covid -mild, dose not require oxygen- reported cough started on 10/12-     - [facility] Guideline for Covid treatment - for Remdesivir -" listed on Page 10 Under Contraindication--:• Not recommended in adult patients with eGFR less than 30 mL/min unless the potential benefit outweighs the potential risk." accessed  10/13/2023.    Covid 19 treatment guideline [facility]- "updated prescription information for Remdesivir to indicate that it can be used without dose adjustment in patient with an estimated glomerular filtration rate (eGFR) of <30 ml/min including those receiving dialysis"   https://www.aygaz53ayindrulpouuyajaugj.nih.gov/therapies/antivirals-including-antibody-products/remdesivir/-accessed 10/13/2023    follow up with renal counterpart for treatment with Remdesivir, given her risk from comorbidities-, symptom less than 7 days-   started Remdesivir treatemnt ( usually start with 200 mg iv on day one then 100 mg daily on day 2-5 or stop when pt is ready for discharge.   continue with monitoring for saturation  - team started Remdesivir 200 mg x 1 on 10/13- plan for 100 mg q daily 10/14 to 10/17 .- completed.  - supportive care.  - anti-tussive - Robitussin DM 10 ml po q 8 hourly -pt advised to ask as needed.  - montelukast 10 mg po q daily   - can reswab to day for covid- if negative to check with SW whether she can go back to shelter earlier than 10 days     - DM - with some hyperglycemia  currently on SS   lantus 5 units sq daily --> to increase to lantus 7 units daily .     renal follow up for ESRD on HD ( MWF )  CAD/NSTEMI - care by primary team appreciated-  SW help appreciated for discharge planning : [facility].  georgie Mackey.
This is a 73F with PMHX of CAD s/p PCI (2017) and recent T1 NSTEMI (s/p recent cardiac cath), ESRD on HD (via LUE AVF), HTN, T2DM, gout, fibroids who presents with a chief complaint of NSTEMI- admitted 10/9-  Medicine consulted for COVID- positive on 10/12/23 ( cough )  event reviewed.    pt seen with Dr. Mackey.  mild cough ( she doubt she has covid -since it is not as severe as covid she got in 2020.  remain saturating well on room air.  she talked about missing mammogram apt ( scheduled today -due to being in hospital ) need to see Gyn for fibroids, intermittent abdominal discomfort going on for months etc.  RRR   moving good air on bilateral lung exam.     Covid -mild, dose not require oxygen- reported cough started on 10/12-     - [facility] Guideline for Covid treatment - for Remdesivir -" listed on Page 10 Under Contraindication--:• Not recommended in adult patients with eGFR less than 30 mL/min unless the potential benefit outweighs the potential risk." accessed  10/13/2023.    Covid 19 treatment guideline [facility]- "updated prescription information for Remdesivir to indicate that it can be used without dose adjustment in patient with an estimated glomerular filtration rate (eGFR) of <30 ml/min including those receiving dialysis"   https://www.ighwa67drlucflypdiulxypjes.nih.gov/therapies/antivirals-including-antibody-products/remdesivir/-accessed 10/13/2023    follow up with renal counterpart for treatment with Remdesivir, given her risk from comorbidities-, symptom less than 7 days-   started Remdesivir treatemnt ( usually start with 200 mg iv on day one then 100 mg daily on day 2-5 or stop when pt is ready for discharge.   continue with monitoring for saturation  - team started Remdesivir 200 mg x 1 on 10/13- plan for 100 mg q daily 10/14 to 10/17 .- completed.  - supportive care.  - anti-tussive - Robitussin DM 10 ml po q 8 hourly -pt advised to ask as needed.  - montelukast 10 mg po q daily     - DM - with some hyperglycemia  currently on SS   lantus 5 units sq daily     renal follow up for ESRD on HD ( MWF )  CAD/NSTEMI - care by primary team appreciated-  SW help appreciated for discharge planning : [facility].  georgie Mackey.
This is a 73F with PMHX of CAD s/p PCI (2017) and recent T1 NSTEMI (s/p recent cardiac cath), ESRD on HD (via LUE AVF), HTN, T2DM, gout, fibroids who presents with a chief complaint of NSTEMI- admitted 10/9-  Medicine consulted for COVID- positive on 10/12/23 ( cough )  event reviewed.  pt seen and examined with Dr. Remy.    remain on room air, still reporting some cough  Remdesivir started, tolerating   reported chest discomfort yesterday during HD.  no shortness of breath at rest.   on exam - awake, alert, comfortable. speaking full sentence, no tachypnea.  lungs clear on exam     Covid -mild, dose not require oxygen- reported cough started on 10/12-     - [facility] Guideline for Covid treatment - for Remdesivir -" listed on Page 10 Under Contraindication--:• Not recommended in adult patients with eGFR less than 30 mL/min unless the potential benefit outweighs the potential risk." accessed  10/13/2023.    Covid 19 treatment guideline [facility]- "updated prescription information for Remdesivir to indicate that it can be used without dose adjustment in patient with an estimated glomerular filtration rate (eGFR) of <30 ml/min including those receiving dialysis"   https://www.fkfuz45cmamniriiqrfjyuhuuc.nih.gov/therapies/antivirals-including-antibody-products/remdesivir/-accessed 10/13/2023    follow up with renal counterpart for treatment with Remdesivir, given her risk from comorbidities-, symptom less than 7 days- may start Remdesivir treatemnt ( usually start with 200 mg iv on day one then 100 mg daily on day 2-5 or stop when pt is ready for discharge.   continue with monitoring for saturation  - team started Remdesivir 200 mg x 1 on 10/13- plan for 100 mg q daily 10/14-   - supportive care.  - anti-tussive - Robitussin DM 10 ml po q 8 hourly standing please  - montelukast 10 mg po q daily   - if sign of bronchospasm - would add symbicort nebs bid ( currently not )  - monitor saturation - if hypoxic - saturation less than 92- to start oxygen supplement and Dexa 6 mg per daily along with Protonix   - thank you for allowing medicine to participate in the care, will follow.     renal follow up for ESRD on HD  CAD/NSTEMI - care by primary team appreciated.   SW help appreciated for discharge planning : Shelter.
This is a 73F with PMHX of CAD s/p PCI (2017) and recent T1 NSTEMI (s/p recent cardiac cath), ESRD on HD (via LUE AVF), HTN, T2DM, gout, fibroids who presents with a chief complaint of NSTEMI- admitted 10/9-  Medicine consulted for COVID- positive on 10/12/23 ( cough )  pt seen with Dr. Barbosa  labs/imaging reviewed    stated she felt much better today -, was coughing yesterday, stated not coughing today.  stated she had covid in 2020 ( then was very sick, this time she has cough only - do not endorse muscle ache, less cough today.  saturating well on room air , no tachypnea.   no shortness of breath.  on exam - awake, alert, comfortable. speaking full sentence, no tachypnea.  lungs clear on exam     Covid -mild, dose not require oxygen- reported cough started on 10/12-     - [facility] Guideline for Covid treatment - for Remdesivir -" listed on Page 10 Under Contraindication--:• Not recommended in adult patients with eGFR less than 30 mL/min unless the potential benefit outweighs the potential risk." accessed  10/13/2023.    Covid 19 treatment guideline [facility]- "updated prescription information for Remdesivir to indicate that it can be used without dose adjustment in patient with an estimated glomerular filtration rate (eGFR) of <30 ml/min including those receiving dialysis"   https://www.mytbb98seznbsyyqczbptyjfcv.nih.gov/therapies/antivirals-including-antibody-products/remdesivir/-accessed 10/13/2023    follow up with renal counterpart for treatment with Remdesivir, given her risk from comorbidities-, symptom less than 7 days- may start Remdesivir treatemnt ( usually start with 200 mg iv on day one then 100 mg daily on day 2-5 or stop when pt is ready for discharge.   continue with monitoring for saturation  - supportive care.  - anti-tussive - Robitussin DM 10 ml po q 8 hourly standing please  - montelukast 10 mg po q daily   - if sign of bronchospasm - would add symbicort nebs bid ( currently not )  - monitor saturation - if hypoxic - saturation less than 92- to start oxygen supplement and Dexa 6 mg per daily along with Protonix   - thank you for allowing medicine to participate in the care, will follow.   dw Dr. Barbosa and cardiology PA.
seen and evaluated while on dialysis  tolerating the procedure well  continue full treatment as outlined above
tolerating HD treatment adequately  target 1L UF
This is a 73F with PMHX of CAD s/p PCI (2017) and recent T1 NSTEMI (s/p recent cardiac cath), ESRD on HD (via LUE AVF), HTN, T2DM, gout, fibroids who presents with a chief complaint of NSTEMI- admitted 10/9-  Medicine consulted for COVID- positive on 10/12/23 ( cough )  event reviewed.    pt seen with Dr. Barbosa.  tired, mild cough  saturating well on room air.   leg cramps.  on exam - no tachypnea, saturating well on room air.  RRR, good air entry bilaterally.     Covid -mild, dose not require oxygen- reported cough started on 10/12-     - [facility] Guideline for Covid treatment - for Remdesivir -" listed on Page 10 Under Contraindication--:• Not recommended in adult patients with eGFR less than 30 mL/min unless the potential benefit outweighs the potential risk." accessed  10/13/2023.    Covid 19 treatment guideline [facility]- "updated prescription information for Remdesivir to indicate that it can be used without dose adjustment in patient with an estimated glomerular filtration rate (eGFR) of <30 ml/min including those receiving dialysis"   https://www.xvsvb04jacbrjmakvxrvjnhcsp.nih.gov/therapies/antivirals-including-antibody-products/remdesivir/-accessed 10/13/2023    follow up with renal counterpart for treatment with Remdesivir, given her risk from comorbidities-, symptom less than 7 days-   started Remdesivir treatemnt ( usually start with 200 mg iv on day one then 100 mg daily on day 2-5 or stop when pt is ready for discharge.   continue with monitoring for saturation  - team started Remdesivir 200 mg x 1 on 10/13- plan for 100 mg q daily 10/14 to 10/17 .   - supportive care.  - anti-tussive - Robitussin DM 10 ml po q 8 hourly -pt advised to ask as needed.  - montelukast 10 mg po q daily   - if sign of bronchospasm - would add symbicort nebs bid ( currently not )  - monitor saturation - if hypoxic - saturation less than 92- to start oxygen supplement and Dexa 6 mg per daily along with Protonix     - DM - with some hyperglycemia  currently on SS   lantus 5 units sq daily     renal follow up for ESRD on HD ( MWF )  CAD/NSTEMI - care by primary team appreciated-  SW help appreciated for discharge planning : Estefania Barbosa.
This is a 73F with PMHX of CAD s/p PCI (2017) and recent T1 NSTEMI (s/p recent cardiac cath), ESRD on HD (via LUE AVF), HTN, T2DM, gout, fibroids who presents with a chief complaint of NSTEMI. Medicine consulted for COVID.     #COVID-19   PCR+ on 10/12, reports sore throat. Afebrile. WBC WNL. On RA 96%. No reported history of asthma/copd. Vaccinated with "multiple boosters" per patient (J&J).  Unable to receive paxlovid given CrCl <30. Does not qualify for dexamethasone given not on oxygen  - s/p Remdesivir x5 day course (ended 10/17)  - Supportive care:  - Standing tylenol 650mg q6hrs for fever  - C/w Robitussin-DM every 8 hours  - C/w Montelukast 10mg qd  - Cepacol lozenges for sore throat  - DVT PPX while inpatient  - Patient can receive influenza vaccination anytime prior to d/c.   - cw special droplets and contact precaution for 10 days , unless repeat COVID swab negative after day 5 and may then d/c contacts by day 8   ( COVID tested positive on 10/12 which is day 0, day 8 today, repeat COVID positive 10/19)   #T2DM (A1c 5.1% 10/2023)  - increase lantus to 5U nightly   - FS q6hrs  - C/w mISS    # ESRD  - Renal f/u appreciated, RRT per Renal     med consult team continues to follow pt with you .
Exam deferred due to Covid.    I: Covid+.  A: ESRD.   P:Continue dialysis. Remdesevir ok to use in ESRD per FDA.
This is a 73F with PMHX of CAD s/p PCI (2017) and recent T1 NSTEMI (s/p recent cardiac cath), ESRD on HD (via LUE AVF), HTN, T2DM, gout, fibroids who presents with a chief complaint of NSTEMI. Medicine consulted for COVID.     #COVID-19   PCR+ on 10/12, reports sore throat. Afebrile. WBC WNL. On RA 96%. No reported history of asthma/copd. Vaccinated with "multiple boosters" per patient (J&J).  Unable to receive paxlovid given CrCl <30. Does not qualify for dexamethasone given not on oxygen  - s/p Remdesivir x5 day course (ended 10/17)  - Supportive care:  - Standing tylenol 650mg q6hrs for fever  - C/w Robitussin-DM every 8 hours  - C/w Montelukast 10mg qd  - Cepacol lozenges for sore throat  - DVT PPX while inpatient  - Patient can receive influenza vaccination anytime prior to d/c.   - cw special droplets and contact precaution for 10 days , unless repeat COVID swab negative after day 5 and may then d/c contacts by day 8     #T2DM (A1c 5.1% 10/2023)  - increase lantus to 7U nightly   - FS q6hrs  - C/w mISS    med consult team continues to follow pt with you
This is a 73F with PMHX of CAD s/p PCI (2017) and recent T1 NSTEMI (s/p recent cardiac cath), ESRD on HD (via LUE AVF), HTN, T2DM, gout, fibroids who presents with a chief complaint of NSTEMI. Medicine consulted for COVID.     #COVID-19   PCR+ on 10/12, reports sore throat. Afebrile. WBC WNL. On RA 96%. No reported history of asthma/copd. Vaccinated with "multiple boosters" per patient (J&J).  Unable to receive paxlovid given CrCl <30. Does not qualify for dexamethasone given not on oxygen  - s/p Remdesivir x5 day course (ended 10/17)  - Supportive care:  - Standing tylenol 650mg q6hrs for fever  - C/w Robitussin-DM every 8 hours  - C/w Montelukast 10mg qd  - Cepacol lozenges for sore throat  - DVT PPX while inpatient  - Patient can receive influenza vaccination anytime prior to d/c.   - cw special droplets and contact precaution for 10 days , unless repeat COVID swab negative after day 5 and may then d/c contacts by day 8   ( COVID tested positive on 10/12 which is day 0, day 9 today, repeat COVID positive 10/19)     #T2DM (A1c 5.1% 10/2023)  - increase lantus to 5U nightly   - FS q6hrs  - C/w mISS    # ESRD  - Renal f/u appreciated, RRT per Renal     med consult team continues to follow pt with you .

## 2023-10-21 NOTE — PROGRESS NOTE ADULT - PROBLEM SELECTOR PROBLEM 1
NSTEMI (non-ST elevation myocardial infarction)

## 2023-10-21 NOTE — PROGRESS NOTE ADULT - ASSESSMENT
73-year-old female with hx of HTN, HLD, DM2, 3vCAD with mLAD 75%, pLCx 70%, and tortuous pRCA 90%, and ESRD on dialysis MWF presenting as a transfer from [facility] for NSTEMI and possible staged PCI. Now s/p LHC with p-mRCA 90% ISR s/p Rota/JAMIL x1 and residual LAD 75%. Plan for Staged PCI for residual LAD lesion in 5 weeks. Course complicated by COVID infection (day 0 10/12/2023); now pending completion of COVID isolation plan for discharge to shelter on 10/22/23.

## 2023-10-21 NOTE — PROGRESS NOTE ADULT - SUBJECTIVE AND OBJECTIVE BOX
Nephrology progress note    No complaints. No HD today    Allergies:  No Known Allergies    REVIEW OF SYSTEMS:  All other review of systems is negative unless indicated above.    PHYSICAL EXAM:  GENERAL: Alert, awake, NAD   CHEST/LUNG: Normal respiratory effort  HEART: Regular rate  ABDOMEN: Soft, nontender, non distended  EXTREMITIES: no pedal edema  Neurology: Awake, alert, interactive  ACCESS: JEMMA STEIN accessed   RADIOLOGY & ADDITIONAL STUDIES:  Reviewed

## 2023-10-21 NOTE — PROGRESS NOTE ADULT - ASSESSMENT
73F ESRD admitted for NSTEMI s/p cardiac cath, now h/c c/b COVID PNA.     Last HD on 10/20      ESRD  EDW: 74kg     Plan:  Continue HD today as  per schedule   Daily BMP   Next HD plan for 10/23  Renal diet     Access:  LUE AVF functional     HTN:  BP stable  Hold meds on HD days for optimal UF, can resume post HD if BP still elevated for better control   UF with HD as tolerated     Anemia:  Hgb at goal 9.9  EPO with HD - last given 10/18    MBD:  Calcium: 7.9  Phos: 7.9, consistently above goal  Consider start sevelamer 800 TID with meals

## 2023-10-21 NOTE — PROGRESS NOTE ADULT - ATTENDING SUPERVISION STATEMENT
Fellow
Fellow
Resident
Fellow
Resident
Fellow

## 2023-10-22 VITALS
DIASTOLIC BLOOD PRESSURE: 84 MMHG | HEART RATE: 70 BPM | OXYGEN SATURATION: 99 % | RESPIRATION RATE: 18 BRPM | TEMPERATURE: 98 F | SYSTOLIC BLOOD PRESSURE: 154 MMHG

## 2023-10-22 LAB
ALBUMIN SERPL ELPH-MCNC: 2.9 G/DL — LOW (ref 3.3–5)
ALBUMIN SERPL ELPH-MCNC: 2.9 G/DL — LOW (ref 3.3–5)
ALP SERPL-CCNC: 160 U/L — HIGH (ref 40–120)
ALP SERPL-CCNC: 160 U/L — HIGH (ref 40–120)
ALT FLD-CCNC: 26 U/L — SIGNIFICANT CHANGE UP (ref 10–45)
ALT FLD-CCNC: 26 U/L — SIGNIFICANT CHANGE UP (ref 10–45)
ANION GAP SERPL CALC-SCNC: 16 MMOL/L — SIGNIFICANT CHANGE UP (ref 5–17)
ANION GAP SERPL CALC-SCNC: 16 MMOL/L — SIGNIFICANT CHANGE UP (ref 5–17)
AST SERPL-CCNC: 27 U/L — SIGNIFICANT CHANGE UP (ref 10–40)
AST SERPL-CCNC: 27 U/L — SIGNIFICANT CHANGE UP (ref 10–40)
BILIRUB SERPL-MCNC: 0.2 MG/DL — SIGNIFICANT CHANGE UP (ref 0.2–1.2)
BILIRUB SERPL-MCNC: 0.2 MG/DL — SIGNIFICANT CHANGE UP (ref 0.2–1.2)
BUN SERPL-MCNC: 63 MG/DL — HIGH (ref 7–23)
BUN SERPL-MCNC: 63 MG/DL — HIGH (ref 7–23)
CALCIUM SERPL-MCNC: 8 MG/DL — LOW (ref 8.4–10.5)
CALCIUM SERPL-MCNC: 8 MG/DL — LOW (ref 8.4–10.5)
CHLORIDE SERPL-SCNC: 96 MMOL/L — SIGNIFICANT CHANGE UP (ref 96–108)
CHLORIDE SERPL-SCNC: 96 MMOL/L — SIGNIFICANT CHANGE UP (ref 96–108)
CO2 SERPL-SCNC: 23 MMOL/L — SIGNIFICANT CHANGE UP (ref 22–31)
CO2 SERPL-SCNC: 23 MMOL/L — SIGNIFICANT CHANGE UP (ref 22–31)
CREAT SERPL-MCNC: 9.54 MG/DL — HIGH (ref 0.5–1.3)
CREAT SERPL-MCNC: 9.54 MG/DL — HIGH (ref 0.5–1.3)
EGFR: 4 ML/MIN/1.73M2 — LOW
EGFR: 4 ML/MIN/1.73M2 — LOW
GLUCOSE BLDC GLUCOMTR-MCNC: 150 MG/DL — HIGH (ref 70–99)
GLUCOSE BLDC GLUCOMTR-MCNC: 150 MG/DL — HIGH (ref 70–99)
GLUCOSE BLDC GLUCOMTR-MCNC: 181 MG/DL — HIGH (ref 70–99)
GLUCOSE BLDC GLUCOMTR-MCNC: 181 MG/DL — HIGH (ref 70–99)
GLUCOSE SERPL-MCNC: 167 MG/DL — HIGH (ref 70–99)
GLUCOSE SERPL-MCNC: 167 MG/DL — HIGH (ref 70–99)
HCT VFR BLD CALC: 32 % — LOW (ref 34.5–45)
HCT VFR BLD CALC: 32 % — LOW (ref 34.5–45)
HGB BLD-MCNC: 9.5 G/DL — LOW (ref 11.5–15.5)
HGB BLD-MCNC: 9.5 G/DL — LOW (ref 11.5–15.5)
MAGNESIUM SERPL-MCNC: 1.9 MG/DL — SIGNIFICANT CHANGE UP (ref 1.6–2.6)
MAGNESIUM SERPL-MCNC: 1.9 MG/DL — SIGNIFICANT CHANGE UP (ref 1.6–2.6)
MCHC RBC-ENTMCNC: 29.1 PG — SIGNIFICANT CHANGE UP (ref 27–34)
MCHC RBC-ENTMCNC: 29.1 PG — SIGNIFICANT CHANGE UP (ref 27–34)
MCHC RBC-ENTMCNC: 29.7 GM/DL — LOW (ref 32–36)
MCHC RBC-ENTMCNC: 29.7 GM/DL — LOW (ref 32–36)
MCV RBC AUTO: 98.2 FL — SIGNIFICANT CHANGE UP (ref 80–100)
MCV RBC AUTO: 98.2 FL — SIGNIFICANT CHANGE UP (ref 80–100)
NRBC # BLD: 0 /100 WBCS — SIGNIFICANT CHANGE UP (ref 0–0)
NRBC # BLD: 0 /100 WBCS — SIGNIFICANT CHANGE UP (ref 0–0)
PHOSPHATE SERPL-MCNC: 5.8 MG/DL — HIGH (ref 2.5–4.5)
PHOSPHATE SERPL-MCNC: 5.8 MG/DL — HIGH (ref 2.5–4.5)
PLATELET # BLD AUTO: 300 K/UL — SIGNIFICANT CHANGE UP (ref 150–400)
PLATELET # BLD AUTO: 300 K/UL — SIGNIFICANT CHANGE UP (ref 150–400)
POTASSIUM SERPL-MCNC: 5.1 MMOL/L — SIGNIFICANT CHANGE UP (ref 3.5–5.3)
POTASSIUM SERPL-MCNC: 5.1 MMOL/L — SIGNIFICANT CHANGE UP (ref 3.5–5.3)
POTASSIUM SERPL-SCNC: 5.1 MMOL/L — SIGNIFICANT CHANGE UP (ref 3.5–5.3)
POTASSIUM SERPL-SCNC: 5.1 MMOL/L — SIGNIFICANT CHANGE UP (ref 3.5–5.3)
PROT SERPL-MCNC: 6.1 G/DL — SIGNIFICANT CHANGE UP (ref 6–8.3)
PROT SERPL-MCNC: 6.1 G/DL — SIGNIFICANT CHANGE UP (ref 6–8.3)
RBC # BLD: 3.26 M/UL — LOW (ref 3.8–5.2)
RBC # BLD: 3.26 M/UL — LOW (ref 3.8–5.2)
RBC # FLD: 16.3 % — HIGH (ref 10.3–14.5)
RBC # FLD: 16.3 % — HIGH (ref 10.3–14.5)
SODIUM SERPL-SCNC: 135 MMOL/L — SIGNIFICANT CHANGE UP (ref 135–145)
SODIUM SERPL-SCNC: 135 MMOL/L — SIGNIFICANT CHANGE UP (ref 135–145)
WBC # BLD: 5.18 K/UL — SIGNIFICANT CHANGE UP (ref 3.8–10.5)
WBC # BLD: 5.18 K/UL — SIGNIFICANT CHANGE UP (ref 3.8–10.5)
WBC # FLD AUTO: 5.18 K/UL — SIGNIFICANT CHANGE UP (ref 3.8–10.5)
WBC # FLD AUTO: 5.18 K/UL — SIGNIFICANT CHANGE UP (ref 3.8–10.5)

## 2023-10-22 PROCEDURE — 80048 BASIC METABOLIC PNL TOTAL CA: CPT

## 2023-10-22 PROCEDURE — 84484 ASSAY OF TROPONIN QUANT: CPT

## 2023-10-22 PROCEDURE — 86803 HEPATITIS C AB TEST: CPT

## 2023-10-22 PROCEDURE — C1725: CPT

## 2023-10-22 PROCEDURE — 82962 GLUCOSE BLOOD TEST: CPT

## 2023-10-22 PROCEDURE — 83605 ASSAY OF LACTIC ACID: CPT

## 2023-10-22 PROCEDURE — 80053 COMPREHEN METABOLIC PANEL: CPT

## 2023-10-22 PROCEDURE — 36415 COLL VENOUS BLD VENIPUNCTURE: CPT

## 2023-10-22 PROCEDURE — 82607 VITAMIN B-12: CPT

## 2023-10-22 PROCEDURE — 82550 ASSAY OF CK (CPK): CPT

## 2023-10-22 PROCEDURE — 0225U NFCT DS DNA&RNA 21 SARSCOV2: CPT

## 2023-10-22 PROCEDURE — 97162 PT EVAL MOD COMPLEX 30 MIN: CPT

## 2023-10-22 PROCEDURE — 83921 ORGANIC ACID SINGLE QUANT: CPT

## 2023-10-22 PROCEDURE — 87635 SARS-COV-2 COVID-19 AMP PRB: CPT

## 2023-10-22 PROCEDURE — 86900 BLOOD TYPING SEROLOGIC ABO: CPT

## 2023-10-22 PROCEDURE — C1889: CPT

## 2023-10-22 PROCEDURE — 84436 ASSAY OF TOTAL THYROXINE: CPT

## 2023-10-22 PROCEDURE — C1753: CPT

## 2023-10-22 PROCEDURE — 82746 ASSAY OF FOLIC ACID SERUM: CPT

## 2023-10-22 PROCEDURE — 85027 COMPLETE CBC AUTOMATED: CPT

## 2023-10-22 PROCEDURE — 97116 GAIT TRAINING THERAPY: CPT

## 2023-10-22 PROCEDURE — 90935 HEMODIALYSIS ONE EVALUATION: CPT

## 2023-10-22 PROCEDURE — C1874: CPT

## 2023-10-22 PROCEDURE — 85025 COMPLETE CBC W/AUTO DIFF WBC: CPT

## 2023-10-22 PROCEDURE — 93005 ELECTROCARDIOGRAM TRACING: CPT

## 2023-10-22 PROCEDURE — 86850 RBC ANTIBODY SCREEN: CPT

## 2023-10-22 PROCEDURE — 82553 CREATINE MB FRACTION: CPT

## 2023-10-22 PROCEDURE — 86901 BLOOD TYPING SEROLOGIC RH(D): CPT

## 2023-10-22 PROCEDURE — 84443 ASSAY THYROID STIM HORMONE: CPT

## 2023-10-22 PROCEDURE — 83735 ASSAY OF MAGNESIUM: CPT

## 2023-10-22 PROCEDURE — C1760: CPT

## 2023-10-22 PROCEDURE — C1769: CPT

## 2023-10-22 PROCEDURE — C1887: CPT

## 2023-10-22 PROCEDURE — 99239 HOSP IP/OBS DSCHRG MGMT >30: CPT

## 2023-10-22 PROCEDURE — 85610 PROTHROMBIN TIME: CPT

## 2023-10-22 PROCEDURE — 80076 HEPATIC FUNCTION PANEL: CPT

## 2023-10-22 PROCEDURE — 99232 SBSQ HOSP IP/OBS MODERATE 35: CPT

## 2023-10-22 PROCEDURE — 71045 X-RAY EXAM CHEST 1 VIEW: CPT

## 2023-10-22 PROCEDURE — 83036 HEMOGLOBIN GLYCOSYLATED A1C: CPT

## 2023-10-22 PROCEDURE — 85730 THROMBOPLASTIN TIME PARTIAL: CPT

## 2023-10-22 PROCEDURE — 84100 ASSAY OF PHOSPHORUS: CPT

## 2023-10-22 PROCEDURE — C1724: CPT

## 2023-10-22 PROCEDURE — C1894: CPT

## 2023-10-22 RX ORDER — SIMETHICONE 80 MG/1
1 TABLET, CHEWABLE ORAL
Qty: 14 | Refills: 0
Start: 2023-10-22 | End: 2023-10-28

## 2023-10-22 RX ORDER — ATORVASTATIN CALCIUM 80 MG/1
1 TABLET, FILM COATED ORAL
Refills: 0 | DISCHARGE

## 2023-10-22 RX ORDER — HYDRALAZINE HCL 50 MG
1 TABLET ORAL
Refills: 0 | DISCHARGE

## 2023-10-22 RX ORDER — GABAPENTIN 400 MG/1
1 CAPSULE ORAL
Refills: 0 | DISCHARGE

## 2023-10-22 RX ORDER — ALLOPURINOL 300 MG
1 TABLET ORAL
Refills: 0 | DISCHARGE

## 2023-10-22 RX ORDER — PANTOPRAZOLE SODIUM 20 MG/1
1 TABLET, DELAYED RELEASE ORAL
Refills: 0 | DISCHARGE

## 2023-10-22 RX ADMIN — Medication 50 MILLIGRAM(S): at 13:22

## 2023-10-22 RX ADMIN — Medication 50 MILLIGRAM(S): at 06:15

## 2023-10-22 RX ADMIN — CARVEDILOL PHOSPHATE 25 MILLIGRAM(S): 80 CAPSULE, EXTENDED RELEASE ORAL at 06:15

## 2023-10-22 RX ADMIN — Medication 81 MILLIGRAM(S): at 13:21

## 2023-10-22 RX ADMIN — CINACALCET 60 MILLIGRAM(S): 30 TABLET, FILM COATED ORAL at 13:22

## 2023-10-22 RX ADMIN — PANTOPRAZOLE SODIUM 40 MILLIGRAM(S): 20 TABLET, DELAYED RELEASE ORAL at 06:15

## 2023-10-22 RX ADMIN — SENNA PLUS 1 TABLET(S): 8.6 TABLET ORAL at 13:22

## 2023-10-22 RX ADMIN — Medication 1: at 08:57

## 2023-10-22 RX ADMIN — MONTELUKAST 10 MILLIGRAM(S): 4 TABLET, CHEWABLE ORAL at 13:21

## 2023-10-22 RX ADMIN — SIMETHICONE 80 MILLIGRAM(S): 80 TABLET, CHEWABLE ORAL at 05:43

## 2023-10-22 RX ADMIN — AMLODIPINE BESYLATE 10 MILLIGRAM(S): 2.5 TABLET ORAL at 06:15

## 2023-10-22 RX ADMIN — CLOPIDOGREL BISULFATE 75 MILLIGRAM(S): 75 TABLET, FILM COATED ORAL at 13:22

## 2023-10-22 NOTE — PROGRESS NOTE ADULT - SUBJECTIVE AND OBJECTIVE BOX
Patient is a 73y old  Female who presents with a chief complaint of NSTEMI (17 Oct 2023 10:19)    INTERVAL EVENTS: NAEON    SUBJECTIVE:  Patient was seen and examined at bedside. No complaints.     Review of systems: No fever, chills, dizziness, HA, Changes in vision, CP, dyspnea, nausea or vomiting, dysuria, changes in bowel movements, LE edema. Rest of 12 point Review of systems negative unless otherwise documented elsewhere in note.     Diet, Renal Restrictions:   For patients receiving Renal Replacement - No Protein Restr, No Conc K, No Conc Phos, Low Sodium  Consistent Carbohydrate No Snacks (CSTCHO)  DASH/TLC Sodium & Cholesterol Restricted (DASH)  Supplement Feeding Modality:  Oral  Nepro Cans or Servings Per Day:  1       Frequency:  Daily (10-10-23 @ 16:42) [Active]      MEDICATIONS:  MEDICATIONS  (STANDING):  amLODIPine   Tablet 10 milliGRAM(s) Oral daily  aspirin enteric coated 81 milliGRAM(s) Oral daily  atorvastatin 80 milliGRAM(s) Oral at bedtime  carvedilol 25 milliGRAM(s) Oral every 12 hours  cinacalcet 60 milliGRAM(s) Oral daily  clopidogrel Tablet 75 milliGRAM(s) Oral daily  dextrose 5%. 1000 milliLiter(s) (50 mL/Hr) IV Continuous <Continuous>  dextrose 5%. 1000 milliLiter(s) (100 mL/Hr) IV Continuous <Continuous>  dextrose 50% Injectable 25 Gram(s) IV Push once  dextrose 50% Injectable 25 Gram(s) IV Push once  dextrose 50% Injectable 12.5 Gram(s) IV Push once  gabapentin 100 milliGRAM(s) Oral <User Schedule>  glucagon  Injectable 1 milliGRAM(s) IntraMuscular once  heparin   Injectable 5000 Unit(s) SubCutaneous every 8 hours  hydrALAZINE 50 milliGRAM(s) Oral three times a day  insulin glargine Injectable (LANTUS) 5 Unit(s) SubCutaneous at bedtime  insulin lispro (ADMELOG) corrective regimen sliding scale   SubCutaneous three times a day before meals  insulin lispro (ADMELOG) corrective regimen sliding scale   SubCutaneous at bedtime  montelukast 10 milliGRAM(s) Oral daily  nitroglycerin     SubLingual 0.4 milliGRAM(s) SubLingual once  pantoprazole    Tablet 40 milliGRAM(s) Oral before breakfast  polyethylene glycol 3350 17 Gram(s) Oral daily  senna 1 Tablet(s) Oral daily  simethicone 80 milliGRAM(s) Chew two times a day    MEDICATIONS  (PRN):  acetaminophen     Tablet .. 650 milliGRAM(s) Oral every 6 hours PRN Temp greater or equal to 38C (100.4F), Mild Pain (1 - 3), Moderate Pain (4 - 6)  benzocaine/menthol Lozenge 1 Lozenge Oral two times a day PRN Sore Throat  dextrose Oral Gel 15 Gram(s) Oral once PRN Blood Glucose LESS THAN 70 milliGRAM(s)/deciliter  guaiFENesin Oral Liquid (Sugar-Free) 100 milliGRAM(s) Oral every 6 hours PRN Cough      Allergies    No Known Allergies    Intolerances        OBJECTIVE:  Vital Signs Last 24 Hrs  T(C): 36.4 (22 Oct 2023 13:20), Max: 36.4 (22 Oct 2023 06:09)  T(F): 97.5 (22 Oct 2023 13:20), Max: 97.5 (22 Oct 2023 06:09)  HR: 70 (22 Oct 2023 13:20) (61 - 78)  BP: 154/84 (22 Oct 2023 13:20) (113/56 - 154/84)  BP(mean): 92 (22 Oct 2023 06:09) (79 - 92)  RR: 18 (22 Oct 2023 13:20) (16 - 18)  SpO2: 99% (22 Oct 2023 13:20) (97% - 100%)    Parameters below as of 22 Oct 2023 13:20  Patient On (Oxygen Delivery Method): room air      I&O's Summary    21 Oct 2023 07:01  -  22 Oct 2023 07:00  --------------------------------------------------------  IN: 480 mL / OUT: 0 mL / NET: 480 mL        PHYSICAL EXAM:  Gen: Reclining in bed at time of exam, appears stated age  HEENT: NCAT, MMM, clear OP  Neck: supple, trachea at midline  CV: RRR, +S1/S2  Pulm: adequate respiratory effort, no increase in work of breathing  Abd: soft, NTND  Skin: warm and dry,   Ext: WWP, no LE edema  Neuro: AOx3, no gross focal neurological deficits  Psych: affect and behavior appropriate, pleasant at time of interview  :     LABS:                        9.5    5.18  )-----------( 300      ( 22 Oct 2023 05:30 )             32.0     10-22    135  |  96  |  63<H>  ----------------------------<  167<H>  5.1   |  23  |  9.54<H>    Ca    8.0<L>      22 Oct 2023 05:30  Phos  5.8     10-22  Mg     1.9     10-22    TPro  6.1  /  Alb  2.9<L>  /  TBili  0.2  /  DBili  x   /  AST  27  /  ALT  26  /  AlkPhos  160<H>  10-22    LIVER FUNCTIONS - ( 22 Oct 2023 05:30 )  Alb: 2.9 g/dL / Pro: 6.1 g/dL / ALK PHOS: 160 U/L / ALT: 26 U/L / AST: 27 U/L / GGT: x             CAPILLARY BLOOD GLUCOSE      POCT Blood Glucose.: 150 mg/dL (22 Oct 2023 12:11)  POCT Blood Glucose.: 181 mg/dL (22 Oct 2023 08:09)  POCT Blood Glucose.: 260 mg/dL (21 Oct 2023 21:46)  POCT Blood Glucose.: 137 mg/dL (21 Oct 2023 17:01)    Urinalysis Basic - ( 22 Oct 2023 05:30 )    Color: x / Appearance: x / SG: x / pH: x  Gluc: 167 mg/dL / Ketone: x  / Bili: x / Urobili: x   Blood: x / Protein: x / Nitrite: x   Leuk Esterase: x / RBC: x / WBC x   Sq Epi: x / Non Sq Epi: x / Bacteria: x        MICRODATA:      RADIOLOGY/OTHER STUDIES:    PCP  Pharmacy:   Emergency contact:

## 2023-10-22 NOTE — PROGRESS NOTE ADULT - ASSESSMENT
This is a 73F with PMHX of CAD s/p PCI (2017) and recent T1 NSTEMI (s/p recent cardiac cath), ESRD on HD (via LUE AVF), HTN, T2DM, gout, fibroids who presents with a chief complaint of NSTEMI. Medicine consulted for COVID.     #COVID-19   PCR+ on 10/12, reports sore throat. Afebrile. WBC WNL. On RA 96%. No reported history of asthma/copd. Vaccinated with "multiple boosters" per patient (J&J).  Unable to receive paxlovid given CrCl <30. Does not qualify for dexamethasone given not on oxygen  - s/p Remdesivir x5 day course (ended 10/17)  - Supportive care:  - Standing tylenol 650mg q6hrs for fever  - C/w Robitussin-DM every 8 hours  - C/w Montelukast 10mg qd  - Cepacol lozenges for sore throat  - DVT PPX while inpatient  - Patient can receive influenza vaccination anytime prior to d/c.   - cw special droplets and contact precaution for 10 days ( COVID tested positive on 10/12 which is day 0, day 10 today, repeat COVID positive 10/19)     #T2DM (A1c 5.1% 10/2023)  - increase lantus to 5U nightly   - FS q6hrs  - C/w mISS    # ESRD  - Renal f/u appreciated, RRT per Renal     Disposition: d/c to Shelter today as d/w Cardiology ROSA ISELA esparza consult team continues to follow pt with you .

## 2023-10-22 NOTE — PROGRESS NOTE ADULT - PROVIDER SPECIALTY LIST ADULT
Cardiology
Internal Medicine
Intervent Cardiology
Intervent Cardiology
Nephrology
Cardiology
Hospitalist
Internal Medicine
Nephrology
Hospitalist
Internal Medicine
Nephrology
Nephrology
Cardiology

## 2023-11-03 DIAGNOSIS — E78.00 PURE HYPERCHOLESTEROLEMIA, UNSPECIFIED: ICD-10-CM

## 2023-11-03 DIAGNOSIS — T82.855A STENOSIS OF CORONARY ARTERY STENT, INITIAL ENCOUNTER: ICD-10-CM

## 2023-11-03 DIAGNOSIS — N18.6 END STAGE RENAL DISEASE: ICD-10-CM

## 2023-11-03 DIAGNOSIS — I12.0 HYPERTENSIVE CHRONIC KIDNEY DISEASE WITH STAGE 5 CHRONIC KIDNEY DISEASE OR END STAGE RENAL DISEASE: ICD-10-CM

## 2023-11-03 DIAGNOSIS — E11.40 TYPE 2 DIABETES MELLITUS WITH DIABETIC NEUROPATHY, UNSPECIFIED: ICD-10-CM

## 2023-11-03 DIAGNOSIS — E11.22 TYPE 2 DIABETES MELLITUS WITH DIABETIC CHRONIC KIDNEY DISEASE: ICD-10-CM

## 2023-11-03 DIAGNOSIS — Z79.82 LONG TERM (CURRENT) USE OF ASPIRIN: ICD-10-CM

## 2023-11-03 DIAGNOSIS — I21.A9 OTHER MYOCARDIAL INFARCTION TYPE: ICD-10-CM

## 2023-11-03 DIAGNOSIS — U07.1 COVID-19: ICD-10-CM

## 2023-11-03 DIAGNOSIS — M10.9 GOUT, UNSPECIFIED: ICD-10-CM

## 2023-11-03 DIAGNOSIS — I25.110 ATHEROSCLEROTIC HEART DISEASE OF NATIVE CORONARY ARTERY WITH UNSTABLE ANGINA PECTORIS: ICD-10-CM

## 2023-11-03 DIAGNOSIS — D64.9 ANEMIA, UNSPECIFIED: ICD-10-CM

## 2023-11-03 DIAGNOSIS — Z79.4 LONG TERM (CURRENT) USE OF INSULIN: ICD-10-CM

## 2023-11-03 DIAGNOSIS — Z95.5 PRESENCE OF CORONARY ANGIOPLASTY IMPLANT AND GRAFT: ICD-10-CM

## 2023-11-03 DIAGNOSIS — L76.32 POSTPROCEDURAL HEMATOMA OF SKIN AND SUBCUTANEOUS TISSUE FOLLOWING OTHER PROCEDURE: ICD-10-CM
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
## 2023-11-03 DIAGNOSIS — Z99.2 DEPENDENCE ON RENAL DIALYSIS: ICD-10-CM

## 2023-11-03 DIAGNOSIS — K52.9 NONINFECTIVE GASTROENTERITIS AND COLITIS, UNSPECIFIED: ICD-10-CM
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
## 2023-11-03 DIAGNOSIS — I77.1 STRICTURE OF ARTERY: ICD-10-CM

## 2023-11-03 DIAGNOSIS — I25.84 CORONARY ATHEROSCLEROSIS DUE TO CALCIFIED CORONARY LESION: ICD-10-CM

## 2023-11-15 ENCOUNTER — APPOINTMENT (OUTPATIENT)
Dept: HEART AND VASCULAR | Facility: CLINIC | Age: 73
End: 2023-11-15

## 2023-12-13 NOTE — PROGRESS NOTE ADULT - ASSESSMENT
This is a 73F with PMHX of CAD s/p PCI (2017) and recent T1 NSTEMI (s/p recent cardiac cath), ESRD on HD (via LUE AVF), HTN, T2DM, gout, fibroids who presents with a chief complaint of NSTEMI. Medicine consulted for COVID.     #COVID-19   PCR+ on 10/12, reports sore throat. Afebrile. WBC WNL. On RA 96%. No reported history of asthma/copd. Vaccinated with "multiple boosters" per patient (J&J).  Unable to receive paxlovid given CrCl <30. Does not qualify for dexamethasone given not on mechanical ventilation.   - s/p Remdesivir x5 day course (ending 10/17)  - Supportive care:  - Standing tylenol 650mg q6hrs for fever  - C/w Robitussin-DM every 8 hours  - C/w Montelukast 10mg qd  - Cepacol lozenges for sore throat  - DVT PPX while inpatient    #T2DM (A1c 5.1% 10/2023)  - increase lantus to 7U nightly   - FS q6hrs  - C/w mISS    Med consult will continue to follow.  Thank you for this consult. Please page [phone] for any questions.  Recommendations not final until attestation signed by attending. (2) cough or sneeze

## 2024-05-15 ENCOUNTER — INPATIENT (INPATIENT)
Facility: HOSPITAL | Age: 74
LOS: 1 days | Discharge: HOME | End: 2024-05-17
Attending: HOSPITALIST | Admitting: HOSPITALIST
Payer: MEDICAID

## 2024-05-15 VITALS
OXYGEN SATURATION: 100 % | HEART RATE: 71 BPM | SYSTOLIC BLOOD PRESSURE: 121 MMHG | DIASTOLIC BLOOD PRESSURE: 77 MMHG | HEIGHT: 65 IN | WEIGHT: 160.06 LBS | TEMPERATURE: 98 F | RESPIRATION RATE: 18 BRPM

## 2024-05-15 DIAGNOSIS — M10.9 GOUT, UNSPECIFIED: ICD-10-CM

## 2024-05-15 DIAGNOSIS — Z95.5 PRESENCE OF CORONARY ANGIOPLASTY IMPLANT AND GRAFT: Chronic | ICD-10-CM

## 2024-05-15 DIAGNOSIS — I25.10 ATHEROSCLEROTIC HEART DISEASE OF NATIVE CORONARY ARTERY WITHOUT ANGINA PECTORIS: ICD-10-CM

## 2024-05-15 DIAGNOSIS — E11.65 TYPE 2 DIABETES MELLITUS WITH HYPERGLYCEMIA: ICD-10-CM

## 2024-05-15 DIAGNOSIS — E87.5 HYPERKALEMIA: ICD-10-CM

## 2024-05-15 DIAGNOSIS — N18.6 END STAGE RENAL DISEASE: ICD-10-CM

## 2024-05-15 DIAGNOSIS — D69.6 THROMBOCYTOPENIA, UNSPECIFIED: ICD-10-CM

## 2024-05-15 DIAGNOSIS — M25.511 PAIN IN RIGHT SHOULDER: ICD-10-CM

## 2024-05-15 DIAGNOSIS — E78.5 HYPERLIPIDEMIA, UNSPECIFIED: ICD-10-CM

## 2024-05-15 DIAGNOSIS — I10 ESSENTIAL (PRIMARY) HYPERTENSION: ICD-10-CM

## 2024-05-15 LAB
ALBUMIN SERPL ELPH-MCNC: 3 G/DL — LOW (ref 3.3–5)
ALP SERPL-CCNC: 295 U/L — HIGH (ref 40–120)
ALT FLD-CCNC: 19 U/L — SIGNIFICANT CHANGE UP (ref 12–78)
ANION GAP SERPL CALC-SCNC: 10 MMOL/L — SIGNIFICANT CHANGE UP (ref 5–17)
AST SERPL-CCNC: 15 U/L — SIGNIFICANT CHANGE UP (ref 15–37)
BASOPHILS # BLD AUTO: 0.04 K/UL — SIGNIFICANT CHANGE UP (ref 0–0.2)
BASOPHILS NFR BLD AUTO: 0.6 % — SIGNIFICANT CHANGE UP (ref 0–2)
BILIRUB SERPL-MCNC: 0.3 MG/DL — SIGNIFICANT CHANGE UP (ref 0.2–1.2)
BUN SERPL-MCNC: 84 MG/DL — HIGH (ref 7–23)
CALCIUM SERPL-MCNC: 8.4 MG/DL — LOW (ref 8.5–10.1)
CHLORIDE SERPL-SCNC: 97 MMOL/L — SIGNIFICANT CHANGE UP (ref 96–108)
CO2 SERPL-SCNC: 31 MMOL/L — SIGNIFICANT CHANGE UP (ref 22–31)
CREAT SERPL-MCNC: 11.3 MG/DL — HIGH (ref 0.5–1.3)
EGFR: 3 ML/MIN/1.73M2 — LOW
EOSINOPHIL # BLD AUTO: 0.11 K/UL — SIGNIFICANT CHANGE UP (ref 0–0.5)
EOSINOPHIL NFR BLD AUTO: 1.8 % — SIGNIFICANT CHANGE UP (ref 0–6)
GLUCOSE BLDC GLUCOMTR-MCNC: 236 MG/DL — HIGH (ref 70–99)
GLUCOSE SERPL-MCNC: 210 MG/DL — HIGH (ref 70–99)
HCT VFR BLD CALC: 36.9 % — SIGNIFICANT CHANGE UP (ref 34.5–45)
HGB BLD-MCNC: 11.7 G/DL — SIGNIFICANT CHANGE UP (ref 11.5–15.5)
IMM GRANULOCYTES NFR BLD AUTO: 0.8 % — SIGNIFICANT CHANGE UP (ref 0–0.9)
LYMPHOCYTES # BLD AUTO: 1.72 K/UL — SIGNIFICANT CHANGE UP (ref 1–3.3)
LYMPHOCYTES # BLD AUTO: 27.8 % — SIGNIFICANT CHANGE UP (ref 13–44)
MCHC RBC-ENTMCNC: 29.6 PG — SIGNIFICANT CHANGE UP (ref 27–34)
MCHC RBC-ENTMCNC: 31.7 G/DL — LOW (ref 32–36)
MCV RBC AUTO: 93.4 FL — SIGNIFICANT CHANGE UP (ref 80–100)
MONOCYTES # BLD AUTO: 0.52 K/UL — SIGNIFICANT CHANGE UP (ref 0–0.9)
MONOCYTES NFR BLD AUTO: 8.4 % — SIGNIFICANT CHANGE UP (ref 2–14)
NEUTROPHILS # BLD AUTO: 3.74 K/UL — SIGNIFICANT CHANGE UP (ref 1.8–7.4)
NEUTROPHILS NFR BLD AUTO: 60.6 % — SIGNIFICANT CHANGE UP (ref 43–77)
NRBC # BLD: 0 /100 WBCS — SIGNIFICANT CHANGE UP (ref 0–0)
PLATELET # BLD AUTO: 131 K/UL — LOW (ref 150–400)
POTASSIUM SERPL-MCNC: 5.5 MMOL/L — HIGH (ref 3.5–5.3)
POTASSIUM SERPL-SCNC: 5.5 MMOL/L — HIGH (ref 3.5–5.3)
PROT SERPL-MCNC: 6.9 GM/DL — SIGNIFICANT CHANGE UP (ref 6–8.3)
RBC # BLD: 3.95 M/UL — SIGNIFICANT CHANGE UP (ref 3.8–5.2)
RBC # FLD: 15.6 % — HIGH (ref 10.3–14.5)
SODIUM SERPL-SCNC: 138 MMOL/L — SIGNIFICANT CHANGE UP (ref 135–145)
WBC # BLD: 6.18 K/UL — SIGNIFICANT CHANGE UP (ref 3.8–10.5)
WBC # FLD AUTO: 6.18 K/UL — SIGNIFICANT CHANGE UP (ref 3.8–10.5)

## 2024-05-15 PROCEDURE — 99285 EMERGENCY DEPT VISIT HI MDM: CPT

## 2024-05-15 PROCEDURE — 99222 1ST HOSP IP/OBS MODERATE 55: CPT

## 2024-05-15 PROCEDURE — 73030 X-RAY EXAM OF SHOULDER: CPT | Mod: 26,RT

## 2024-05-15 RX ORDER — PANTOPRAZOLE SODIUM 20 MG/1
40 TABLET, DELAYED RELEASE ORAL
Refills: 0 | Status: DISCONTINUED | OUTPATIENT
Start: 2024-05-15 | End: 2024-05-17

## 2024-05-15 RX ORDER — SODIUM ZIRCONIUM CYCLOSILICATE 10 G/10G
10 POWDER, FOR SUSPENSION ORAL ONCE
Refills: 0 | Status: COMPLETED | OUTPATIENT
Start: 2024-05-15 | End: 2024-05-15

## 2024-05-15 RX ORDER — DEXTROSE 50 % IN WATER 50 %
15 SYRINGE (ML) INTRAVENOUS ONCE
Refills: 0 | Status: DISCONTINUED | OUTPATIENT
Start: 2024-05-15 | End: 2024-05-17

## 2024-05-15 RX ORDER — LANOLIN ALCOHOL/MO/W.PET/CERES
3 CREAM (GRAM) TOPICAL AT BEDTIME
Refills: 0 | Status: DISCONTINUED | OUTPATIENT
Start: 2024-05-15 | End: 2024-05-17

## 2024-05-15 RX ORDER — SODIUM ZIRCONIUM CYCLOSILICATE 10 G/10G
10 POWDER, FOR SUSPENSION ORAL
Refills: 0 | DISCHARGE

## 2024-05-15 RX ORDER — CINACALCET 30 MG/1
0 TABLET, FILM COATED ORAL
Refills: 0 | DISCHARGE

## 2024-05-15 RX ORDER — CARVEDILOL PHOSPHATE 80 MG/1
1 CAPSULE, EXTENDED RELEASE ORAL
Refills: 0 | DISCHARGE

## 2024-05-15 RX ORDER — ONDANSETRON 8 MG/1
4 TABLET, FILM COATED ORAL EVERY 8 HOURS
Refills: 0 | Status: DISCONTINUED | OUTPATIENT
Start: 2024-05-15 | End: 2024-05-17

## 2024-05-15 RX ORDER — ALLOPURINOL 300 MG
1 TABLET ORAL
Refills: 0 | DISCHARGE

## 2024-05-15 RX ORDER — INSULIN LISPRO 100/ML
1 VIAL (ML) SUBCUTANEOUS
Refills: 0 | Status: DISCONTINUED | OUTPATIENT
Start: 2024-05-15 | End: 2024-05-17

## 2024-05-15 RX ORDER — HYDRALAZINE HCL 50 MG
1 TABLET ORAL
Refills: 0 | DISCHARGE

## 2024-05-15 RX ORDER — ACETAMINOPHEN 500 MG
650 TABLET ORAL EVERY 6 HOURS
Refills: 0 | Status: DISCONTINUED | OUTPATIENT
Start: 2024-05-15 | End: 2024-05-17

## 2024-05-15 RX ORDER — CARVEDILOL PHOSPHATE 80 MG/1
6.25 CAPSULE, EXTENDED RELEASE ORAL EVERY 12 HOURS
Refills: 0 | Status: DISCONTINUED | OUTPATIENT
Start: 2024-05-15 | End: 2024-05-17

## 2024-05-15 RX ORDER — DOCUSATE SODIUM 100 MG
1 CAPSULE ORAL
Refills: 0 | DISCHARGE

## 2024-05-15 RX ORDER — PANTOPRAZOLE SODIUM 20 MG/1
1 TABLET, DELAYED RELEASE ORAL
Refills: 0 | DISCHARGE

## 2024-05-15 RX ORDER — ASPIRIN/CALCIUM CARB/MAGNESIUM 324 MG
81 TABLET ORAL DAILY
Refills: 0 | Status: DISCONTINUED | OUTPATIENT
Start: 2024-05-15 | End: 2024-05-17

## 2024-05-15 RX ORDER — EZETIMIBE 10 MG/1
1 TABLET ORAL
Refills: 0 | DISCHARGE

## 2024-05-15 RX ORDER — CINACALCET 30 MG/1
60 TABLET, FILM COATED ORAL DAILY
Refills: 0 | Status: DISCONTINUED | OUTPATIENT
Start: 2024-05-15 | End: 2024-05-17

## 2024-05-15 RX ORDER — ALLOPURINOL 300 MG
100 TABLET ORAL
Refills: 0 | Status: DISCONTINUED | OUTPATIENT
Start: 2024-05-15 | End: 2024-05-17

## 2024-05-15 RX ORDER — SEVELAMER CARBONATE 2400 MG/1
1 POWDER, FOR SUSPENSION ORAL
Refills: 0 | DISCHARGE

## 2024-05-15 RX ORDER — DEXTROSE 10 % IN WATER 10 %
125 INTRAVENOUS SOLUTION INTRAVENOUS ONCE
Refills: 0 | Status: DISCONTINUED | OUTPATIENT
Start: 2024-05-15 | End: 2024-05-17

## 2024-05-15 RX ORDER — GLUCAGON INJECTION, SOLUTION 0.5 MG/.1ML
1 INJECTION, SOLUTION SUBCUTANEOUS ONCE
Refills: 0 | Status: DISCONTINUED | OUTPATIENT
Start: 2024-05-15 | End: 2024-05-17

## 2024-05-15 RX ORDER — AMLODIPINE BESYLATE 2.5 MG/1
1 TABLET ORAL
Refills: 0 | DISCHARGE

## 2024-05-15 RX ORDER — GABAPENTIN 400 MG/1
100 CAPSULE ORAL THREE TIMES A DAY
Refills: 0 | Status: DISCONTINUED | OUTPATIENT
Start: 2024-05-15 | End: 2024-05-17

## 2024-05-15 RX ORDER — SEMAGLUTIDE 0.68 MG/ML
0.25 INJECTION, SOLUTION SUBCUTANEOUS
Refills: 0 | DISCHARGE

## 2024-05-15 RX ORDER — INSULIN GLARGINE 100 [IU]/ML
10 INJECTION, SOLUTION SUBCUTANEOUS AT BEDTIME
Refills: 0 | Status: DISCONTINUED | OUTPATIENT
Start: 2024-05-15 | End: 2024-05-17

## 2024-05-15 RX ORDER — DEXTROSE 50 % IN WATER 50 %
25 SYRINGE (ML) INTRAVENOUS ONCE
Refills: 0 | Status: DISCONTINUED | OUTPATIENT
Start: 2024-05-15 | End: 2024-05-17

## 2024-05-15 RX ORDER — INSULIN LISPRO 100/ML
VIAL (ML) SUBCUTANEOUS
Refills: 0 | Status: DISCONTINUED | OUTPATIENT
Start: 2024-05-15 | End: 2024-05-17

## 2024-05-15 RX ORDER — SODIUM CHLORIDE 9 MG/ML
1000 INJECTION, SOLUTION INTRAVENOUS
Refills: 0 | Status: DISCONTINUED | OUTPATIENT
Start: 2024-05-15 | End: 2024-05-17

## 2024-05-15 RX ORDER — DEXTROSE 50 % IN WATER 50 %
12.5 SYRINGE (ML) INTRAVENOUS ONCE
Refills: 0 | Status: DISCONTINUED | OUTPATIENT
Start: 2024-05-15 | End: 2024-05-17

## 2024-05-15 RX ORDER — CLOPIDOGREL BISULFATE 75 MG/1
75 TABLET, FILM COATED ORAL DAILY
Refills: 0 | Status: DISCONTINUED | OUTPATIENT
Start: 2024-05-15 | End: 2024-05-17

## 2024-05-15 RX ORDER — ATORVASTATIN CALCIUM 80 MG/1
80 TABLET, FILM COATED ORAL AT BEDTIME
Refills: 0 | Status: DISCONTINUED | OUTPATIENT
Start: 2024-05-15 | End: 2024-05-17

## 2024-05-15 RX ADMIN — SODIUM ZIRCONIUM CYCLOSILICATE 10 GRAM(S): 10 POWDER, FOR SUSPENSION ORAL at 19:46

## 2024-05-15 RX ADMIN — GABAPENTIN 100 MILLIGRAM(S): 400 CAPSULE ORAL at 21:45

## 2024-05-15 RX ADMIN — INSULIN GLARGINE 10 UNIT(S): 100 INJECTION, SOLUTION SUBCUTANEOUS at 21:48

## 2024-05-15 RX ADMIN — Medication 650 MILLIGRAM(S): at 21:44

## 2024-05-15 RX ADMIN — ATORVASTATIN CALCIUM 80 MILLIGRAM(S): 80 TABLET, FILM COATED ORAL at 21:44

## 2024-05-15 RX ADMIN — Medication 650 MILLIGRAM(S): at 22:44

## 2024-05-15 RX ADMIN — Medication 2: at 21:46

## 2024-05-15 NOTE — ED PROVIDER NOTE - NSICDXPASTMEDICALHX_GEN_ALL_CORE_FT
PAST MEDICAL HISTORY:  CAD (coronary artery disease)     CAD (coronary artery disease)     Colitis     Diabetes mellitus     DM (diabetes mellitus)     ESRD on dialysis     ESRD on dialysis     Gout     Hypertension     Hypertension     Mild hypercholesterolemia

## 2024-05-15 NOTE — CHART NOTE - NSCHARTNOTEFT_GEN_A_CORE
CARON contacted pt's outpatient dialysis provider, [facility] ([phone], [address] [facility] [place] [address]) in order to see if they would be able to fit pt in for a dialysis slot this afternoon or tomorrow. CARON spoke with  staff who reported that they would not be able to accommodate pt as the last dialysis slot that they offer is 3:30 in the afternoon and that the center is closed on Thursdays.

## 2024-05-15 NOTE — PATIENT PROFILE ADULT - FALL HARM RISK - HARM RISK INTERVENTIONS

## 2024-05-15 NOTE — ED ADULT TRIAGE NOTE - CHIEF COMPLAINT QUOTE
BIB EMS c/o R shoulder pain x4 days ago and back today, states began on Saturday when she began to lose balance but someone helped her. Denies LOC, SOB, chest pain. Last fall last month where she fell over her walker and remembers hitting R arm. L AV fistula (M,W,F) DM, HTN BIB EMS c/o R shoulder pain x4 days ago and back today, states began on Saturday when she began to lose balance but someone helped her. Denies LOC, SOB, chest pain. Last fall last month ago. L AV fistula (M,W,F) DM, HTN BIB EMS from shelter c/o R shoulder pain x4 days ago and back today, states began on Saturday when she began to lose balance but someone helped her, denies fall. Denies LOC, SOB, chest pain. Last fall one month ago, denies injury. L AV fistula (M,W,F) DM, HTN

## 2024-05-15 NOTE — H&P ADULT - ASSESSMENT
Marina Reyes is a 74 year old female with PMHx of HTN, HLD, IDDM2, CAD (s/p PCI), and ESRD on HD (M/W/F, through LUE AV fistula) who presented to the ED on 5/15/24 for complaints of right shoulder pain and admitted for ESRD needing HD. Marina Reyes is a 74 year old female with PMHx of HTN, HLD, IDDM2, CAD (s/p PCI), ESRD on HD (M/W/F, through LUE AV fistula), and gout who presented to the ED on 5/15/24 for complaints of right shoulder pain and admitted for ESRD needing HD.    ESRD needing HD  Complaints of missing HD today due to R. shoulder pain  PTA cinacalcet 60 mg  Plan for HD in AM as per nephrology  Nephrology recommendations appreciated    Hyperkalemia secondary to missed HD session  Potassium 5.5 on admission  S/p Lokelma 10 g PO in the ED  F/u repeat K in AM    Right shoulder pain, improving  Complaints of fall onto R. shoulder one month ago, denies pain at that time  Sudden onset severe R. shoulder pain this AM  R. shoulder x-ray with minimal degenerative change  S/p Percocet 5 / 325 mg PO in the ED  F/u MRI R. shoulder given severity of pain to r/o ligamentous injury  Will hold off on PT consult until MRI results    Thrombocytopenia, unclear etiology  Plts 131K  No signs of active bleeding  Monitor plts      Chronic medical conditions:  HTN, uncontrolled possibly secondary to missed HD session: BP as elevated as 184/73 on admission, PTA carvedilol 6.25 mg BID  HLD: PTA atorvastatin 80 mg, ezetimibe 10 mg held given not on formulary  IDDM2 with hyperglycemia: last known A1c 5.1 (on 10/10/23), blood glucose 210 on admission, POC qac and qhs, PTA Lantus 10 U qhs and Humalog 1 U qac, SSI qac started, blood glucose goal < 180, f/u A1c  CAD (s/p PCI): PTA ASA 81 mg and clopidogrel 75 mg  Gout: PTA allopurinol 100 mg M/W/F post-HD    Medication reconciliation completed using med list provided by patient.

## 2024-05-15 NOTE — H&P ADULT - HISTORY OF PRESENT ILLNESS
Marinaquiana Reyes is a 74 year old female with PMHx of HTN, HLD, IDDM2, CAD (s/p PCI Marina Reyes is a 74 year old female with PMHx of HTN, HLD, IDDM2, CAD (s/p PCI), and ESRD on HD (M/W/F, through LUE AV fistula) who presented to the ED on 5/15/24 for complaints of right shoulder pain.    Patient reports she fell one month ago and landed on her right shoulder. At that time, she did not have any pain so she did not seek medical care. Therefore, no imaging was performed. Today, she had worsening sharp right shoulder pain to the point where she felt she needed to come to the ER. Intermittent in nature and severity was 9/10. States she never really had shoulder pain prior to today despite falling a month ago. Baseline functional status is ambulates with walker and cane and independent with all ADLs. Lives at a shelter [place] ago.    In the ED, VSS except BP as elevated as 184/73. Plts 131K, potassium 5.5, BUN 84, Cr 11.30, glucose 210. Right shoulder x-ray without radiographic findings. Received Percocet 5 / 325 mg and Lokelma 10 g PO. Marina Reyes is a 74 year old female with PMHx of HTN, HLD, IDDM2, CAD (s/p PCI), ESRD on HD (M/W/F, through LUE AV fistula), and gout who presented to the ED on 5/15/24 for complaints of right shoulder pain.    Patient reports she fell one month ago and landed on her right shoulder. At that time, she did not have any pain so she did not seek medical care. Therefore, no imaging was performed. Today, she had worsening sharp right shoulder pain to the point where she felt she needed to come to the ER. Intermittent in nature and severity was 9/10. States she never really had shoulder pain prior to today despite falling a month ago. Baseline functional status is ambulates with walker and cane and independent with all ADLs. Lives at a shelter since [place] the [place] ago.    In the ED, VSS except BP as elevated as 184/73. Plts 131K, potassium 5.5, BUN 84, Cr 11.30, glucose 210. Right shoulder x-ray without radiographic findings. Received Percocet 5 / 325 mg and Lokelma 10 g PO. Marina Reyes is a 74 year old female with PMHx of HTN, HLD, IDDM2, CAD (s/p PCI), ESRD on HD (M/W/F, through LUE AV fistula), and gout who presented to the ED on 5/15/24 for complaints of right shoulder pain.    Patient reports she fell one month ago and landed on her right shoulder. At that time, she did not have any pain so she did not seek medical care. Therefore, no imaging was performed. Today, she had worsening sharp right shoulder pain to the point where she felt she needed to come to the ER. Intermittent in nature and severity was 9/10. States she never really had shoulder pain prior to today despite falling a month ago. Baseline functional status is ambulates with walker and cane and independent with all ADLs. Lives at a shelter since [place] the [place] ago.    In the ED, VSS except BP as elevated as 184/73. Plts 131K, potassium 5.5, BUN 84, Cr 11.30, glucose 210. Right shoulder x-ray without radiographic findings. Received Percocet 5 / 325 mg and Lokelma 10 g PO. Evaluated by nephrology who is planning for HD in AM.

## 2024-05-15 NOTE — ED ADULT NURSE NOTE - ED STAT RN HANDOFF DETAILS
Report given to CORIN max on ed hold . Rn made aware of pt pmh and hpi, lab and imagign results, IV access, fistula, missed dialysis, medications given, and POC. VSS, nad noted. Pt A&Ox4.

## 2024-05-15 NOTE — H&P ADULT - TIME BILLING
coordination of care with ER physician and ER RN, obtaining history, performing a physical examination, reviewing and interpreting labs and imaging, ordering further studies and tests, explaining the diagnosis and treatment plan to patient, completing medication reconciliation, and documentation as above.

## 2024-05-15 NOTE — H&P ADULT - NSHPPHYSICALEXAM_GEN_ALL_CORE
T(C): 36.7 (05-15-24 @ 21:12), Max: 36.7 (05-15-24 @ 20:00)  HR: 69 (05-15-24 @ 21:12) (69 - 90)  BP: 171/73 (05-15-24 @ 21:12) (121/77 - 184/73)  RR: 18 (05-15-24 @ 21:12) (18 - 20)  SpO2: 96% (05-15-24 @ 21:12) (96% - 100%)    CONSTITUTIONAL: Well groomed  EYES: PERRLA and symmetric, EOMI  ENMT: Oral mucosa with moist membranes  RESP: No respiratory distress, no use of accessory muscles; CTA b/l  CV: RRR,  LUE AV fistula with audible and palpable thrill  GI: Soft, NT, ND  MSK. R. shoulder with limited AROM and PROM

## 2024-05-15 NOTE — ED PROVIDER NOTE - PROGRESS NOTE DETAILS
patients pain controlled but she missed HD so will consult sw to see if she can get HD today or tomorrow if not will need obs for HD appreciate sw consult from mr garcia unfortunately not possible to obtain HD until friday so will order labs and plan to obs for HD Joanne: Pt care signed out to me at change of shift, pending labR. K+ 5.5, BUN/Cr 84/11.3. Will admit to medicine (d/w Dr Mims), Nephro (Dr Jean) consulted. Pt updated to results, admission. She understands / agrees w/ this plan. Joanne: Pt care signed out to me at change of shift, pending labR. K+ 5.5, BUN/Cr 84/11.3. Will admit to medicine (d/w Dr Mims), Nephro (Dr Jean) consulted. On re-eval, resting comfortably. Pt updated to results, admission. She understands / agrees w/ this plan.

## 2024-05-15 NOTE — ED PROVIDER NOTE - OBJECTIVE STATEMENT
The patient had sudden onset R shoulder pain today that is shooting down to her hand. She reports a fall ~4wks ago and hit the shoulder but did not seek medical attention at that time. This never happened before.

## 2024-05-15 NOTE — ED PROVIDER NOTE - NEUROLOGICAL, MLM
Alert and oriented, no focal deficits, no motor or sensory deficits. Sensation and strength in both arms equal and normal.

## 2024-05-15 NOTE — ED ADULT NURSE NOTE - NSFALLRISKINTERV_ED_ALL_ED

## 2024-05-15 NOTE — ED ADULT NURSE NOTE - OBJECTIVE STATEMENT
patient alert and oriented x4, came in for shoulder pain. pt states that over a month ago she fell and injured her right shoulder denies hitting head or losing consciousness. right shoulder pain is worsening the past 4x days and back today. pt also lost balance on saturday where shoulder pain was worsening, pt did not fall at that time. pt denies SOB, chest pain. pt pmh of dialysis L AV Fistula MWF missed dialysis this morning, DM, HTN. upon assessment, no redness, swelling, inflammation or deformity noted on extremity.

## 2024-05-15 NOTE — PATIENT PROFILE ADULT - SURGICAL SITE INCISION
He presents today with 2 weeks of needing stool within 5-7 mins of most meals.  No specific triggers are noted, no diet restrictions. Stooling 2-3x/day, bristol type 7, no blood in toilet or wiping.  Urgency is noted, no nighttime stooling. No recent issues with constipaton.  Appetite remains normal. Drinks water well, occasionally milk and juice.  Restricting cheese has not helped.  No weight loss noted. Notes 6/10 sharp epigastric pain without radiation, usually after eating, which leads to urge to defecate.  This does not relieve his pain.  Notes nausea with the pain, no vomiting.  Notes infrequent heartburn, no dysphagia.  Flatulence is noted, no belching or bloating.  No current issues with anxiety since he is out of school.
no

## 2024-05-15 NOTE — H&P ADULT - NSHPLABSRESULTS_GEN_ALL_CORE
11.7   6.18  )-----------( 131      ( 15 May 2024 17:00 )             36.9     138  |  97  |  84<H>  ----------------------------<  210<H>     05-15  5.5<H>   |  31  |  11.30<H>    Ca    8.4<L>      15 May 2024 18:02    TPro  6.9  /  Alb  3.0<L>  /  TBili  0.3  /  DBili  x   /  AST  15  /  ALT  19  /  AlkPhos  295<H>  05-15    R. shoulder x-ray 5/15/24  FINDINGS:  Minimal degenerative change with no fracture, dislocation or radiographic soft tissue abnormality.    IMPRESSION:  No acute radiographic findings, if there is continued clinical concern follow-up MRI can be ordered.

## 2024-05-15 NOTE — ED PROVIDER NOTE - CCCP TRG CHIEF CMPLNT
[facility]    PATIENT'S NAME: Bharat PACKER   ATTENDING PHYSICIAN: Clau Canada.  Loreto Ivan MD   PATIENT ACCOUNT#:   [id number]    LOCATION:  [address] [place] [address] RECORD #:   [id number]       YOB: 1940  ADMISSION DATE:       11/22/2017 shoulder pain/injury

## 2024-05-15 NOTE — ED ADULT NURSE NOTE - CHIEF COMPLAINT QUOTE
BIB EMS from shelter c/o R shoulder pain x4 days ago and back today, states began on Saturday when she began to lose balance but someone helped her, denies fall. Denies LOC, SOB, chest pain. Last fall one month ago, denies injury. L AV fistula (M,W,F) DM, HTN

## 2024-05-15 NOTE — H&P ADULT - NSICDXPASTMEDICALHX_GEN_ALL_CORE_FT
PAST MEDICAL HISTORY:  CAD S/P percutaneous coronary angioplasty     ESRD on dialysis     Gout     HLD (hyperlipidemia)     Hypertension     Insulin dependent type 2 diabetes mellitus

## 2024-05-15 NOTE — CONSULT NOTE ADULT - SUBJECTIVE AND OBJECTIVE BOX
Patient chart reviewed, full consult to follow.     Will arrange for HD tomorrow     Thank you for the courtesy of this consultation. [facility], [facility]  NEPHROLOGY AND HYPERTENSION  [address]  [address]  [place] [address]  [phone]    MD GINA GIBSON MD YELENA ROSENBERG, MD BINNY KOSHY, MD CHRISTOPHER CAPUTO, MD EDWARD BOVER, MD      Information from chart:  "Patient is a 74y old  Female who presents with a chief complaint of ESRD needing HD (15 May 2024 22:15)    HPI:  Marina Reyes is a 74 year old female with PMHx of HTN, HLD, IDDM2, CAD (s/p PCI), and ESRD on HD (M/W/F, through LUE AV fistula) who presented to the ED on 5/15/24 for complaints of right shoulder pain.    Patient reports she fell one month ago and landed on her right shoulder. At that time, she did not have any pain so she did not seek medical care. Therefore, no imaging was performed. Today, she had worsening sharp right shoulder pain to the point where she felt she needed to come to the ER. Intermittent in nature and severity was 9/10. States she never really had shoulder pain prior to today despite falling a month ago. Baseline functional status is ambulates with walker and cane and independent with all ADLs. Lives at a shelter [place] the [place] ago.    In the ED, VSS except BP as elevated as 184/73. Plts 131K, potassium 5.5, BUN 84, Cr 11.30, glucose 210. Right shoulder x-ray without radiographic findings. Received Percocet 5 / 325 mg and Lokelma 10 g PO. (15 May 2024 22:15)   "  Weak   Missed HD for today    PAST MEDICAL & SURGICAL HISTORY:  ESRD on dialysis      DM (diabetes mellitus)      Hypertension      CAD (coronary artery disease)      Gout      Diabetes mellitus      Hypertension      Mild hypercholesterolemia      ESRD on dialysis      CAD (coronary artery disease)      Colitis      H/O heart artery stent      No significant past surgical history        FAMILY HISTORY:  No pertinent family history in first degree relatives      Allergies    No Known Allergies    Intolerances      Home Medications:  allopurinol 100 mg oral tablet: 1 tab(s) orally Monday, Wednesday, and Friday (15 May 2024 20:30)  aspirin 81 mg oral delayed release tablet: 1 tab(s) orally once a day (12 Oct 2023 15:34)  carvedilol 6.25 mg oral tablet: 1 tab(s) orally 2 times a day (15 May 2024 20:38)  cinacalcet 60 mg oral tablet: 1 tab(s) orally once a day (12 Oct 2023 15:34)  docusate sodium 100 mg oral tablet: 1 tab(s) orally 2 times a day (15 May 2024 20:38)  ezetimibe 10 mg oral tablet: 1 tab(s) orally once a day (15 May 2024 20:36)  gabapentin 100 mg oral capsule: 1 cap(s) orally 3 times a day (12 Oct 2023 15:34)  insulin detemir 100 units/mL subcutaneous solution: 10 unit(s) subcutaneous once a day (at bedtime) (12 Oct 2023 15:34)  insulin lispro 100 units/mL injectable solution: 1 unit(s) injectable 3 times a day (12 Oct 2023 15:34)  Lipitor 80 mg oral tablet: 1 tab(s) orally once a day (at bedtime) (12 Oct 2023 15:34)  pantoprazole 20 mg oral delayed release tablet: 1 tab(s) orally once a day (15 May 2024 20:37)  Vitamin B Complex with C oral tablet: 1 tab(s) orally once a day (15 May 2024 20:33)    MEDICATIONS  (STANDING):  allopurinol 100 milliGRAM(s) Oral <User Schedule>  aspirin enteric coated 81 milliGRAM(s) Oral daily  atorvastatin 80 milliGRAM(s) Oral at bedtime  bisacodyl 5 milliGRAM(s) Oral every 12 hours  carvedilol 6.25 milliGRAM(s) Oral every 12 hours  cinacalcet 60 milliGRAM(s) Oral daily  clopidogrel Tablet 75 milliGRAM(s) Oral daily  dextrose 10% Bolus 125 milliLiter(s) IV Bolus once  dextrose 5%. 1000 milliLiter(s) (50 mL/Hr) IV Continuous <Continuous>  dextrose 5%. 1000 milliLiter(s) (100 mL/Hr) IV Continuous <Continuous>  dextrose 50% Injectable 12.5 Gram(s) IV Push once  dextrose 50% Injectable 25 Gram(s) IV Push once  gabapentin 100 milliGRAM(s) Oral three times a day  glucagon  Injectable 1 milliGRAM(s) IntraMuscular once  insulin glargine Injectable (LANTUS) 10 Unit(s) SubCutaneous at bedtime  insulin lispro (ADMELOG) corrective regimen sliding scale   SubCutaneous three times a day before meals  insulin lispro Injectable (ADMELOG) 1 Unit(s) SubCutaneous three times a day before meals  pantoprazole    Tablet 40 milliGRAM(s) Oral before breakfast  vitamin B complex with vitamin C 1 Tablet(s) Oral daily    MEDICATIONS  (PRN):  acetaminophen     Tablet .. 650 milliGRAM(s) Oral every 6 hours PRN Temp greater or equal to 38C (100.4F), Mild Pain (1 - 3)  aluminum hydroxide/magnesium hydroxide/simethicone Suspension 30 milliLiter(s) Oral every 4 hours PRN Dyspepsia  dextrose Oral Gel 15 Gram(s) Oral once PRN Blood Glucose LESS THAN 70 milliGRAM(s)/deciliter  melatonin 3 milliGRAM(s) Oral at bedtime PRN Insomnia  ondansetron Injectable 4 milliGRAM(s) IV Push every 8 hours PRN Nausea and/or Vomiting    Vital Signs Last 24 Hrs  T(C): 36.7 (15 May 2024 21:12), Max: 36.7 (15 May 2024 20:00)  T(F): 98.1 (15 May 2024 21:12), Max: 98.1 (15 May 2024 21:12)  HR: 69 (15 May 2024 21:12) (69 - 90)  BP: 171/73 (15 May 2024 21:12) (121/77 - 184/73)  BP(mean): --  RR: 18 (15 May 2024 21:12) (18 - 20)  SpO2: 96% (15 May 2024 21:12) (96% - 100%)    Parameters below as of 15 May 2024 20:00  Patient On (Oxygen Delivery Method): room air        Daily Height in cm: 165.1 (15 May 2024 07:36)    Daily     CAPILLARY BLOOD GLUCOSE      POCT Blood Glucose.: 236 mg/dL (15 May 2024 21:15)    PHYSICAL EXAM:      T(C): 36.7 (05-15-24 @ 21:12), Max: 36.7 (05-15-24 @ 20:00)  HR: 69 (05-15-24 @ 21:12) (69 - 90)  BP: 171/73 (05-15-24 @ 21:12) (121/77 - 184/73)  RR: 18 (05-15-24 @ 21:12) (18 - 20)  SpO2: 96% (05-15-24 @ 21:12) (96% - 100%)  Wt(kg): --  Lungs clear  Heart S1S2  Abd soft NT ND  Extremities:   tr edema  AVF +              05-15    138  |  97  |  84<H>  ----------------------------<  210<H>  5.5<H>   |  31  |  11.30<H>    Ca    8.4<L>      15 May 2024 18:02    TPro  6.9  /  Alb  3.0<L>  /  TBili  0.3  /  DBili  x   /  AST  15  /  ALT  19  /  AlkPhos  295<H>  05-15                          11.7   6.18  )-----------( 131      ( 15 May 2024 17:00 )             36.9     Creatinine Trend: 11.30<--  Urinalysis Basic - ( 15 May 2024 18:02 )    Color: x / Appearance: x / SG: x / pH: x  Gluc: 210 mg/dL / Ketone: x  / Bili: x / Urobili: x   Blood: x / Protein: x / Nitrite: x   Leuk Esterase: x / RBC: x / WBC x   Sq Epi: x / Non Sq Epi: x / Bacteria: x            Assessment   ESRD, right shoulder pain   Weakness    Plan  Will arrange for HD tomorrow  PT evaluation     Nicko Jean MD          Thank you for the courtesy of this consultation.

## 2024-05-16 LAB
A1C WITH ESTIMATED AVERAGE GLUCOSE RESULT: 8.2 % — HIGH (ref 4–5.6)
ANION GAP SERPL CALC-SCNC: 13 MMOL/L — SIGNIFICANT CHANGE UP (ref 5–17)
BUN SERPL-MCNC: 100 MG/DL — HIGH (ref 7–23)
CALCIUM SERPL-MCNC: 8.1 MG/DL — LOW (ref 8.5–10.1)
CHLORIDE SERPL-SCNC: 96 MMOL/L — SIGNIFICANT CHANGE UP (ref 96–108)
CO2 SERPL-SCNC: 25 MMOL/L — SIGNIFICANT CHANGE UP (ref 22–31)
CREAT SERPL-MCNC: 12.2 MG/DL — HIGH (ref 0.5–1.3)
EGFR: 3 ML/MIN/1.73M2 — LOW
ESTIMATED AVERAGE GLUCOSE: 189 MG/DL — HIGH (ref 68–114)
GLUCOSE BLDC GLUCOMTR-MCNC: 203 MG/DL — HIGH (ref 70–99)
GLUCOSE BLDC GLUCOMTR-MCNC: 226 MG/DL — HIGH (ref 70–99)
GLUCOSE BLDC GLUCOMTR-MCNC: 329 MG/DL — HIGH (ref 70–99)
GLUCOSE BLDC GLUCOMTR-MCNC: 76 MG/DL — SIGNIFICANT CHANGE UP (ref 70–99)
GLUCOSE SERPL-MCNC: 175 MG/DL — HIGH (ref 70–99)
PHOSPHATE SERPL-MCNC: 3.5 MG/DL — SIGNIFICANT CHANGE UP (ref 2.5–4.5)
POTASSIUM SERPL-MCNC: 5.7 MMOL/L — HIGH (ref 3.5–5.3)
POTASSIUM SERPL-SCNC: 5.7 MMOL/L — HIGH (ref 3.5–5.3)
SODIUM SERPL-SCNC: 134 MMOL/L — LOW (ref 135–145)

## 2024-05-16 PROCEDURE — 99232 SBSQ HOSP IP/OBS MODERATE 35: CPT

## 2024-05-16 PROCEDURE — 72141 MRI NECK SPINE W/O DYE: CPT | Mod: 26

## 2024-05-16 PROCEDURE — 73221 MRI JOINT UPR EXTREM W/O DYE: CPT | Mod: 26,RT

## 2024-05-16 RX ORDER — CHLORHEXIDINE GLUCONATE 213 G/1000ML
1 SOLUTION TOPICAL DAILY
Refills: 0 | Status: DISCONTINUED | OUTPATIENT
Start: 2024-05-16 | End: 2024-05-17

## 2024-05-16 RX ADMIN — GABAPENTIN 100 MILLIGRAM(S): 400 CAPSULE ORAL at 21:10

## 2024-05-16 RX ADMIN — ATORVASTATIN CALCIUM 80 MILLIGRAM(S): 80 TABLET, FILM COATED ORAL at 21:10

## 2024-05-16 RX ADMIN — Medication 81 MILLIGRAM(S): at 12:36

## 2024-05-16 RX ADMIN — Medication 5 MILLIGRAM(S): at 06:02

## 2024-05-16 RX ADMIN — GABAPENTIN 100 MILLIGRAM(S): 400 CAPSULE ORAL at 06:02

## 2024-05-16 RX ADMIN — Medication 1 UNIT(S): at 08:22

## 2024-05-16 RX ADMIN — CINACALCET 60 MILLIGRAM(S): 30 TABLET, FILM COATED ORAL at 12:36

## 2024-05-16 RX ADMIN — CARVEDILOL PHOSPHATE 6.25 MILLIGRAM(S): 80 CAPSULE, EXTENDED RELEASE ORAL at 06:02

## 2024-05-16 RX ADMIN — INSULIN GLARGINE 10 UNIT(S): 100 INJECTION, SOLUTION SUBCUTANEOUS at 21:11

## 2024-05-16 RX ADMIN — Medication 2: at 07:59

## 2024-05-16 RX ADMIN — Medication 2: at 11:37

## 2024-05-16 RX ADMIN — PANTOPRAZOLE SODIUM 40 MILLIGRAM(S): 20 TABLET, DELAYED RELEASE ORAL at 08:00

## 2024-05-16 RX ADMIN — Medication 1 TABLET(S): at 12:36

## 2024-05-16 RX ADMIN — Medication 1 UNIT(S): at 11:37

## 2024-05-16 RX ADMIN — CLOPIDOGREL BISULFATE 75 MILLIGRAM(S): 75 TABLET, FILM COATED ORAL at 12:37

## 2024-05-16 NOTE — PROGRESS NOTE ADULT - ASSESSMENT
Marina Reyes is a 74 year old female with PMHx of HTN, HLD, IDDM2, CAD (s/p PCI), ESRD on HD (M/W/F, through LUE AV fistula), and gout who presented to the ED on 5/15/24 for complaints of right shoulder pain and admitted for ESRD needing HD.    ESRD needing HD  Complaints of missing HD today due to R. shoulder pain  PTA cinacalcet 60 mg  Plan for HD   Nephrology recommendations appreciated    Hyperkalemia secondary to missed HD session  Potassium 5.5 on admission  S/p Lokelma 10 g PO in the ED      Right shoulder pain, improving  Complaints of fall onto R. shoulder one month ago, denies pain at that time  has good ROM with out pain. now c/o of nubness on the arm and palm.   R. shoulder x-ray with minimal degenerative change  S/p Percocet 5 / 325 mg PO in the ED  F/u MRI R. shoulder given severity of pain to r/o ligamentous injury and mr cspine       Thrombocytopenia, unclear etiology  Plts 131K  No signs of active bleeding  Monitor plts      Chronic medical conditions:  HTN, uncontrolled possibly secondary to missed HD session: BP as elevated as 184/73 on admission, PTA carvedilol 6.25 mg BID  HLD: PTA atorvastatin 80 mg, ezetimibe 10 mg held given not on formulary  IDDM2 with hyperglycemia: last known A1c 5.1 (on 10/10/23), blood glucose 210 on admission, POC qac and qhs, PTA Lantus 10 U qhs and Humalog 1 U qac, SSI qac started, blood glucose goal < 180, f/u A1c  CAD (s/p PCI): PTA ASA 81 mg and clopidogrel 75 mg  Gout: PTA allopurinol 100 mg M/W/F post-HD    Medication reconciliation completed using med list provided by patient. dc planning after dialysis and

## 2024-05-17 ENCOUNTER — TRANSCRIPTION ENCOUNTER (OUTPATIENT)
Age: 74
End: 2024-05-17

## 2024-05-17 VITALS
SYSTOLIC BLOOD PRESSURE: 122 MMHG | DIASTOLIC BLOOD PRESSURE: 77 MMHG | RESPIRATION RATE: 17 BRPM | HEART RATE: 85 BPM | OXYGEN SATURATION: 98 % | TEMPERATURE: 99 F

## 2024-05-17 LAB
ANION GAP SERPL CALC-SCNC: 11 MMOL/L — SIGNIFICANT CHANGE UP (ref 5–17)
BUN SERPL-MCNC: 58 MG/DL — HIGH (ref 7–23)
CALCIUM SERPL-MCNC: 8.6 MG/DL — SIGNIFICANT CHANGE UP (ref 8.5–10.1)
CHLORIDE SERPL-SCNC: 97 MMOL/L — SIGNIFICANT CHANGE UP (ref 96–108)
CO2 SERPL-SCNC: 27 MMOL/L — SIGNIFICANT CHANGE UP (ref 22–31)
CREAT SERPL-MCNC: 8.67 MG/DL — HIGH (ref 0.5–1.3)
EGFR: 4 ML/MIN/1.73M2 — LOW
GLUCOSE BLDC GLUCOMTR-MCNC: 131 MG/DL — HIGH (ref 70–99)
GLUCOSE BLDC GLUCOMTR-MCNC: 227 MG/DL — HIGH (ref 70–99)
GLUCOSE BLDC GLUCOMTR-MCNC: 229 MG/DL — HIGH (ref 70–99)
GLUCOSE BLDC GLUCOMTR-MCNC: 274 MG/DL — HIGH (ref 70–99)
GLUCOSE SERPL-MCNC: 137 MG/DL — HIGH (ref 70–99)
POTASSIUM SERPL-MCNC: 5.3 MMOL/L — SIGNIFICANT CHANGE UP (ref 3.5–5.3)
POTASSIUM SERPL-SCNC: 5.3 MMOL/L — SIGNIFICANT CHANGE UP (ref 3.5–5.3)
SODIUM SERPL-SCNC: 135 MMOL/L — SIGNIFICANT CHANGE UP (ref 135–145)

## 2024-05-17 PROCEDURE — 99239 HOSP IP/OBS DSCHRG MGMT >30: CPT

## 2024-05-17 RX ORDER — INSULIN LISPRO 100/ML
2 VIAL (ML) SUBCUTANEOUS ONCE
Refills: 0 | Status: DISCONTINUED | OUTPATIENT
Start: 2024-05-17 | End: 2024-05-17

## 2024-05-17 RX ORDER — INSULIN LISPRO 100/ML
1 VIAL (ML) SUBCUTANEOUS ONCE
Refills: 0 | Status: DISCONTINUED | OUTPATIENT
Start: 2024-05-17 | End: 2024-05-17

## 2024-05-17 RX ADMIN — Medication 1 UNIT(S): at 16:49

## 2024-05-17 RX ADMIN — CARVEDILOL PHOSPHATE 6.25 MILLIGRAM(S): 80 CAPSULE, EXTENDED RELEASE ORAL at 06:30

## 2024-05-17 RX ADMIN — CHLORHEXIDINE GLUCONATE 1 APPLICATION(S): 213 SOLUTION TOPICAL at 14:46

## 2024-05-17 RX ADMIN — GABAPENTIN 100 MILLIGRAM(S): 400 CAPSULE ORAL at 14:45

## 2024-05-17 RX ADMIN — PANTOPRAZOLE SODIUM 40 MILLIGRAM(S): 20 TABLET, DELAYED RELEASE ORAL at 09:38

## 2024-05-17 RX ADMIN — Medication 5 MILLIGRAM(S): at 06:30

## 2024-05-17 RX ADMIN — Medication 650 MILLIGRAM(S): at 18:38

## 2024-05-17 RX ADMIN — CLOPIDOGREL BISULFATE 75 MILLIGRAM(S): 75 TABLET, FILM COATED ORAL at 14:45

## 2024-05-17 RX ADMIN — Medication 1 UNIT(S): at 08:10

## 2024-05-17 RX ADMIN — Medication 5 MILLIGRAM(S): at 17:29

## 2024-05-17 RX ADMIN — INSULIN GLARGINE 10 UNIT(S): 100 INJECTION, SOLUTION SUBCUTANEOUS at 21:32

## 2024-05-17 RX ADMIN — Medication 81 MILLIGRAM(S): at 14:46

## 2024-05-17 RX ADMIN — GABAPENTIN 100 MILLIGRAM(S): 400 CAPSULE ORAL at 06:29

## 2024-05-17 RX ADMIN — ATORVASTATIN CALCIUM 80 MILLIGRAM(S): 80 TABLET, FILM COATED ORAL at 21:32

## 2024-05-17 RX ADMIN — GABAPENTIN 100 MILLIGRAM(S): 400 CAPSULE ORAL at 21:52

## 2024-05-17 RX ADMIN — Medication 100 MILLIGRAM(S): at 09:37

## 2024-05-17 RX ADMIN — CINACALCET 60 MILLIGRAM(S): 30 TABLET, FILM COATED ORAL at 14:45

## 2024-05-17 RX ADMIN — Medication 1 TABLET(S): at 14:45

## 2024-05-17 RX ADMIN — CARVEDILOL PHOSPHATE 6.25 MILLIGRAM(S): 80 CAPSULE, EXTENDED RELEASE ORAL at 17:29

## 2024-05-17 RX ADMIN — Medication 3: at 16:48

## 2024-05-17 NOTE — DISCHARGE NOTE PROVIDER - NSDCMRMEDTOKEN_GEN_ALL_CORE_FT
allopurinol 100 mg oral tablet: 1 tab(s) orally Monday, Wednesday, and Friday  aspirin 81 mg oral delayed release tablet: 1 tab(s) orally once a day  carvedilol 6.25 mg oral tablet: 1 tab(s) orally 2 times a day  cinacalcet 60 mg oral tablet: 1 tab(s) orally once a day  clopidogrel 75 mg oral tablet: 1 tab(s) orally once a day  docusate sodium 100 mg oral tablet: 1 tab(s) orally 2 times a day  ezetimibe 10 mg oral tablet: 1 tab(s) orally once a day  gabapentin 100 mg oral capsule: 1 cap(s) orally 3 times a day  insulin detemir 100 units/mL subcutaneous solution: 10 unit(s) subcutaneous once a day (at bedtime)  insulin lispro 100 units/mL injectable solution: 1 unit(s) injectable 3 times a day  Lipitor 80 mg oral tablet: 1 tab(s) orally once a day (at bedtime)  pantoprazole 20 mg oral delayed release tablet: 1 tab(s) orally once a day  Vitamin B Complex with C oral tablet: 1 tab(s) orally once a day

## 2024-05-17 NOTE — DIETITIAN INITIAL EVALUATION ADULT - OTHER INFO
Pt seen during HD and reports she is hungry. Reports mostly good appetite and PO intake PTA, however sometimes does not have a big appetite. Tries to follow renal diet, and monitor her sugar intake. A1c=8.2%; takes insulin to control her BG levels at home.  Consuming % of meals during admission. Amenable to Nepro supplement daily for additional protein/nutrition.  Denies any chew/swallowing difficulty or any current N/V/D/C.  Wt appears mostly stable; per chart review, pt weighed 73.9 kg in 10/2023 and current wt 72.5 kg. Changes/fluctuations in wt likely fluid-related, as pt is chronic HD pt.

## 2024-05-17 NOTE — DIETITIAN INITIAL EVALUATION ADULT - PERTINENT LABORATORY DATA
05-17    135  |  97  |  58<H>  ----------------------------<  137<H>  5.3   |  27  |  8.67<H>    Ca    8.6      17 May 2024 05:30  Phos  3.5     05-16    TPro  6.9  /  Alb  3.0<L>  /  TBili  0.3  /  DBili  x   /  AST  15  /  ALT  19  /  AlkPhos  295<H>  05-15  POCT Blood Glucose.: 131 mg/dL (05-17-24 @ 07:33)  A1C with Estimated Average Glucose Result: 8.2 % (05-16-24 @ 08:20)  A1C with Estimated Average Glucose Result: 5.1 % (10-10-23 @ 05:30)  A1C with Estimated Average Glucose Result: 7.8 % (08-22-23 @ 06:20)

## 2024-05-17 NOTE — DISCHARGE NOTE PROVIDER - CARE PROVIDER_API CALL
Marlin Mar  Orthopaedic Surgery  [facility], [address] [address]  Phone: [phone]  Fax: [phone]  Follow Up Time:

## 2024-05-17 NOTE — DISCHARGE NOTE PROVIDER - HOSPITAL COURSE
Marina Reyes is a 74 year old female with PMHx of HTN, HLD, IDDM2, CAD (s/p PCI), ESRD on HD (M/W/F, through LUE AV fistula), and gout who presented to the ED on 5/15/24 for complaints of right shoulder pain and admitted for ESRD needing HD.    ESRD needing HD  s/p dialysis     Hyperkalemia secondary to missed HD session      Right shoulder pain, improving  Complaints of fall onto R. shoulder one month ago, denies pain at that time  has good ROM with out pain. now c/o of numbness on the arm and palm.   R. shoulder x-ray with minimal degenerative change  S/p Percocet 5 / 325 mg PO in the ED   MRI R. shoulder unrevealing. MR c spine shows degenerative changes and disc dz. Will refer to orthopedic spine surgeon outpatient    Thrombocytopenia, unclear etiology  Plts 131K  No signs of active bleeding  Monitor plts      Chronic medical conditions:  HTN, uncontrolled possibly secondary to missed HD session: BP as elevated as 184/73 on admission, PTA carvedilol 6.25 mg BID  HLD: PTA atorvastatin 80 mg, ezetimibe 10 mg held given not on formulary  IDDM2 with hyperglycemia: last known A1c 5.1 (on 10/10/23), blood glucose 210 on admission, POC qac and qhs, PTA Lantus 10 U qhs and Humalog 1 U qac, SSI qac started, blood glucose goal < 180, f/u A1c  CAD (s/p PCI): PTA ASA 81 mg and clopidogrel 75 mg  Gout: PTA allopurinol 100 mg M/W/F post-HD    Medication reconciliation completed using med list provided by patient. dc planning after dialysis and

## 2024-05-17 NOTE — PROGRESS NOTE ADULT - SUBJECTIVE AND OBJECTIVE BOX
[facility] NEPHROLOGY SERVICES, [facility]  NEPHROLOGY AND HYPERTENSION  [address]  [address]  [place] [address]  [phone]    MD GINA GIBSON MD YELENA ROSENBERG, MD BINNY KOSHY, MD CHRISTOPHER CAPUTO, MD EDWARD BOVER, MD          Patient events noted    MEDICATIONS  (STANDING):  allopurinol 100 milliGRAM(s) Oral <User Schedule>  aspirin enteric coated 81 milliGRAM(s) Oral daily  atorvastatin 80 milliGRAM(s) Oral at bedtime  bisacodyl 5 milliGRAM(s) Oral every 12 hours  carvedilol 6.25 milliGRAM(s) Oral every 12 hours  chlorhexidine 2% Cloths 1 Application(s) Topical daily  cinacalcet 60 milliGRAM(s) Oral daily  clopidogrel Tablet 75 milliGRAM(s) Oral daily  dextrose 10% Bolus 125 milliLiter(s) IV Bolus once  dextrose 5%. 1000 milliLiter(s) (50 mL/Hr) IV Continuous <Continuous>  dextrose 5%. 1000 milliLiter(s) (100 mL/Hr) IV Continuous <Continuous>  dextrose 50% Injectable 12.5 Gram(s) IV Push once  dextrose 50% Injectable 25 Gram(s) IV Push once  gabapentin 100 milliGRAM(s) Oral three times a day  glucagon  Injectable 1 milliGRAM(s) IntraMuscular once  insulin glargine Injectable (LANTUS) 10 Unit(s) SubCutaneous at bedtime  insulin lispro (ADMELOG) corrective regimen sliding scale   SubCutaneous three times a day before meals  insulin lispro Injectable (ADMELOG) 1 Unit(s) SubCutaneous three times a day before meals  pantoprazole    Tablet 40 milliGRAM(s) Oral before breakfast  vitamin B complex with vitamin C 1 Tablet(s) Oral daily    MEDICATIONS  (PRN):  acetaminophen     Tablet .. 650 milliGRAM(s) Oral every 6 hours PRN Temp greater or equal to 38C (100.4F), Mild Pain (1 - 3)  aluminum hydroxide/magnesium hydroxide/simethicone Suspension 30 milliLiter(s) Oral every 4 hours PRN Dyspepsia  dextrose Oral Gel 15 Gram(s) Oral once PRN Blood Glucose LESS THAN 70 milliGRAM(s)/deciliter  melatonin 3 milliGRAM(s) Oral at bedtime PRN Insomnia  ondansetron Injectable 4 milliGRAM(s) IV Push every 8 hours PRN Nausea and/or Vomiting      05-16-24 @ 07:01  -  05-17-24 @ 07:00  --------------------------------------------------------  IN: 240 mL / OUT: 2000 mL / NET: -1760 mL    05-17-24 @ 07:01  -  05-17-24 @ 22:13  --------------------------------------------------------  IN: 0 mL / OUT: 1000 mL / NET: -1000 mL      PHYSICAL EXAM:      T(C): 37.1 (05-17-24 @ 16:56), Max: 37.2 (05-17-24 @ 13:50)  HR: 85 (05-17-24 @ 16:56) (59 - 85)  BP: 122/77 (05-17-24 @ 16:56) (122/77 - 156/75)  RR: 17 (05-17-24 @ 16:56) (16 - 18)  SpO2: 98% (05-17-24 @ 16:56) (96% - 100%)  Wt(kg): --  Lungs clear  Heart S1S2  Abd soft NT ND  Extremities:   tr edema                05-17    135  |  97  |  58<H>  ----------------------------<  137<H>  5.3   |  27  |  8.67<H>    Ca    8.6      17 May 2024 05:30  Phos  3.5     05-16          Creatinine Trend: 8.67<--, 12.20<--, 11.30<--      Assessment   ESRD, Maintenance    Plan:  HD for today  UF as tolerated   Will follow       Nicko Jean MD
NEPHROLOGY PROGRESS NOTE    CHIEF COMPLAINT:  ESRD    HPI:  Patient currently at MRI    EXAM:  Vital Signs Last 24 Hrs  T(C): 36.7 (16 May 2024 10:51), Max: 36.8 (15 May 2024 22:15)  T(F): 98 (16 May 2024 10:51), Max: 98.2 (15 May 2024 22:15)  HR: 70 (16 May 2024 10:51) (69 - 90)  BP: 128/75 (16 May 2024 10:51) (128/75 - 184/73)  BP(mean): --  RR: 18 (16 May 2024 10:51) (18 - 20)  SpO2: 98% (16 May 2024 10:51) (96% - 100%)    Parameters below as of 16 May 2024 10:51  Patient On (Oxygen Delivery Method): room air      LABS                        11.7   6.18  )-----------( 131      ( 15 May 2024 17:00 )             36.9     05-16    134<L>  |  96  |  100<H>  ----------------------------<  175<H>  5.7<H>   |  25  |  12.20<H>    Ca    8.1<L>      16 May 2024 08:20  Phos  3.5     05-16    TPro  6.9  /  Alb  3.0<L>  /  TBili  0.3  /  DBili  x   /  AST  15  /  ALT  19  /  AlkPhos  295<H>  05-15      Assessment   ESRD, right shoulder pain   Mild hyperkalemia    Plan  HD later today after MRI is ordered  Low K diet      
Patient is a 74y old  Female who presents with a chief complaint of ESRD needing HD (15 May 2024 22:15)      INTERVAL HPI/OVERNIGHT EVENTS: none. improved shoulder pain     MEDICATIONS  (STANDING):  allopurinol 100 milliGRAM(s) Oral <User Schedule>  aspirin enteric coated 81 milliGRAM(s) Oral daily  atorvastatin 80 milliGRAM(s) Oral at bedtime  bisacodyl 5 milliGRAM(s) Oral every 12 hours  carvedilol 6.25 milliGRAM(s) Oral every 12 hours  cinacalcet 60 milliGRAM(s) Oral daily  clopidogrel Tablet 75 milliGRAM(s) Oral daily  dextrose 10% Bolus 125 milliLiter(s) IV Bolus once  dextrose 5%. 1000 milliLiter(s) (50 mL/Hr) IV Continuous <Continuous>  dextrose 5%. 1000 milliLiter(s) (100 mL/Hr) IV Continuous <Continuous>  dextrose 50% Injectable 12.5 Gram(s) IV Push once  dextrose 50% Injectable 25 Gram(s) IV Push once  gabapentin 100 milliGRAM(s) Oral three times a day  glucagon  Injectable 1 milliGRAM(s) IntraMuscular once  insulin glargine Injectable (LANTUS) 10 Unit(s) SubCutaneous at bedtime  insulin lispro (ADMELOG) corrective regimen sliding scale   SubCutaneous three times a day before meals  insulin lispro Injectable (ADMELOG) 1 Unit(s) SubCutaneous three times a day before meals  pantoprazole    Tablet 40 milliGRAM(s) Oral before breakfast  vitamin B complex with vitamin C 1 Tablet(s) Oral daily    MEDICATIONS  (PRN):  acetaminophen     Tablet .. 650 milliGRAM(s) Oral every 6 hours PRN Temp greater or equal to 38C (100.4F), Mild Pain (1 - 3)  aluminum hydroxide/magnesium hydroxide/simethicone Suspension 30 milliLiter(s) Oral every 4 hours PRN Dyspepsia  dextrose Oral Gel 15 Gram(s) Oral once PRN Blood Glucose LESS THAN 70 milliGRAM(s)/deciliter  melatonin 3 milliGRAM(s) Oral at bedtime PRN Insomnia  ondansetron Injectable 4 milliGRAM(s) IV Push every 8 hours PRN Nausea and/or Vomiting      Allergies    No Known Allergies    Intolerances        REVIEW OF SYSTEMS:  CONSTITUTIONAL: No fever, weight loss  EYES: No eye pain, visual disturbances, or discharge  ENMT:  No difficulty hearing, tinnitus, vertigo; No sinus or throat pain  RESPIRATORY: No cough, wheezing, chills or hemoptysis; No shortness of breath  CARDIOVASCULAR: No chest pain, palpitations, dizziness, or leg swelling  GASTROINTESTINAL: No abdominal or epigastric pain. No nausea, vomiting, or hematemesis; No diarrhea or constipation. No melena or hematochezia.  GENITOURINARY: No dysuria, frequency, hematuria, or incontinence  NEUROLOGICAL: No headaches, memory loss, loss of strength, numbness, or tremors  SKIN: No itching, burning, rashes, or lesions           Vital Signs Last 24 Hrs  T(C): 36.7 (16 May 2024 10:51), Max: 36.8 (15 May 2024 22:15)  T(F): 98 (16 May 2024 10:51), Max: 98.2 (15 May 2024 22:15)  HR: 70 (16 May 2024 10:51) (69 - 90)  BP: 128/75 (16 May 2024 10:51) (128/75 - 184/73)  BP(mean): --  RR: 18 (16 May 2024 10:51) (18 - 20)  SpO2: 98% (16 May 2024 10:51) (96% - 100%)    Parameters below as of 16 May 2024 10:51  Patient On (Oxygen Delivery Method): room air        PHYSICAL EXAM:  GENERAL: NAD  HEAD:  Atraumatic, Normocephalic  EYES: EOMI, PERRLA, conjunctiva and sclera clear  ENMT: No tonsillar erythema, exudates, or enlargement;   NECK: Supple, Normal thyroid  NERVOUS SYSTEM:  Alert & Oriented X3, Good concentration; Motor Strength 5/5 B/L upper and lower extremities; DTRs 2+ intact and symmetric  CHEST/LUNG: CTABL; No rales, rhonchi, wheezing, or rubs  HEART: Regular rate and rhythm; No murmurs, rubs, or gallops  ABDOMEN: Soft, Nontender, Nondistended; Bowel sounds present  EXTREMITIES:  2+ Peripheral Pulses, No clubbing, cyanosis, or edema  LYMPH: No lymphadenopathy noted  SKIN: No rashes or lesions  Right shoulder: normal cross arm. good  passive and active ROM. numbness of right hand     LABS:                        11.7   6.18  )-----------( 131      ( 15 May 2024 17:00 )             36.9     05-16    134<L>  |  96  |  100<H>  ----------------------------<  175<H>  5.7<H>   |  25  |  12.20<H>    Ca    8.1<L>      16 May 2024 08:20  Phos  3.5     05-16    TPro  6.9  /  Alb  3.0<L>  /  TBili  0.3  /  DBili  x   /  AST  15  /  ALT  19  /  AlkPhos  295<H>  05-15      Urinalysis Basic - ( 16 May 2024 08:20 )    Color: x / Appearance: x / SG: x / pH: x  Gluc: 175 mg/dL / Ketone: x  / Bili: x / Urobili: x   Blood: x / Protein: x / Nitrite: x   Leuk Esterase: x / RBC: x / WBC x   Sq Epi: x / Non Sq Epi: x / Bacteria: x      CAPILLARY BLOOD GLUCOSE      POCT Blood Glucose.: 226 mg/dL (16 May 2024 11:19)  POCT Blood Glucose.: 203 mg/dL (16 May 2024 07:52)  POCT Blood Glucose.: 76 mg/dL (16 May 2024 03:38)  POCT Blood Glucose.: 236 mg/dL (15 May 2024 21:15)      RADIOLOGY & ADDITIONAL TESTS:    Imaging Personally Reviewed:  [ ] YES  [ ] NO    Consultant(s) Notes Reviewed:  [ ] YES  [ ] NO    Care Discussed with Consultants/Other Providers [ ] YES  [ ] NO

## 2024-05-17 NOTE — DIETITIAN INITIAL EVALUATION ADULT - PERTINENT MEDS FT
MEDICATIONS  (STANDING):  allopurinol 100 milliGRAM(s) Oral <User Schedule>  aspirin enteric coated 81 milliGRAM(s) Oral daily  atorvastatin 80 milliGRAM(s) Oral at bedtime  bisacodyl 5 milliGRAM(s) Oral every 12 hours  carvedilol 6.25 milliGRAM(s) Oral every 12 hours  chlorhexidine 2% Cloths 1 Application(s) Topical daily  cinacalcet 60 milliGRAM(s) Oral daily  clopidogrel Tablet 75 milliGRAM(s) Oral daily  dextrose 10% Bolus 125 milliLiter(s) IV Bolus once  dextrose 5%. 1000 milliLiter(s) (50 mL/Hr) IV Continuous <Continuous>  dextrose 5%. 1000 milliLiter(s) (100 mL/Hr) IV Continuous <Continuous>  dextrose 50% Injectable 12.5 Gram(s) IV Push once  dextrose 50% Injectable 25 Gram(s) IV Push once  gabapentin 100 milliGRAM(s) Oral three times a day  glucagon  Injectable 1 milliGRAM(s) IntraMuscular once  insulin glargine Injectable (LANTUS) 10 Unit(s) SubCutaneous at bedtime  insulin lispro (ADMELOG) corrective regimen sliding scale   SubCutaneous three times a day before meals  insulin lispro Injectable (ADMELOG) 1 Unit(s) SubCutaneous three times a day before meals  pantoprazole    Tablet 40 milliGRAM(s) Oral before breakfast  vitamin B complex with vitamin C 1 Tablet(s) Oral daily    MEDICATIONS  (PRN):  acetaminophen     Tablet .. 650 milliGRAM(s) Oral every 6 hours PRN Temp greater or equal to 38C (100.4F), Mild Pain (1 - 3)  aluminum hydroxide/magnesium hydroxide/simethicone Suspension 30 milliLiter(s) Oral every 4 hours PRN Dyspepsia  dextrose Oral Gel 15 Gram(s) Oral once PRN Blood Glucose LESS THAN 70 milliGRAM(s)/deciliter  melatonin 3 milliGRAM(s) Oral at bedtime PRN Insomnia  ondansetron Injectable 4 milliGRAM(s) IV Push every 8 hours PRN Nausea and/or Vomiting

## 2024-05-17 NOTE — DISCHARGE NOTE PROVIDER - NSDCCPCAREPLAN_GEN_ALL_CORE_FT
PRINCIPAL DISCHARGE DIAGNOSIS  Diagnosis: Right shoulder pain  Assessment and Plan of Treatment: Marina Reyes is a 74 year old female with PMHx of HTN, HLD, IDDM2, CAD (s/p PCI), ESRD on HD (M/W/F, through LUE AV fistula), and gout who presented to the ED on 5/15/24 for complaints of right shoulder pain and admitted for ESRD needing HD.  ESRD needing HD  s/p dialysis   Hyperkalemia secondary to missed HD session  Right shoulder pain, improving  Complaints of fall onto R. shoulder one month ago, denies pain at that time  has good ROM with out pain. now c/o of numbness on the arm and palm.   R. shoulder x-ray with minimal degenerative change  S/p Percocet 5 / 325 mg PO in the ED   MRI R. shoulder unrevealing. MR c spine shows degenerative changes and disc dz. Will refer to orthopedic spine surgeon outpatient        SECONDARY DISCHARGE DIAGNOSES  Diagnosis: ESRD needing dialysis  Assessment and Plan of Treatment:

## 2024-05-17 NOTE — DIETITIAN INITIAL EVALUATION ADULT - NS FNS DIET ORDER
Diet, Regular:   Consistent Carbohydrate {Evening Snack}  For patients receiving Renal Replacement - No Protein Restr, No Conc K, No Conc Phos, Low Sodium (RENAL) (05-17-24 @ 03:39)

## 2024-05-17 NOTE — DISCHARGE NOTE NURSING/CASE MANAGEMENT/SOCIAL WORK - PATIENT PORTAL LINK FT
You can access the FollowMyHealth Patient Portal offered by [facility] by registering at the following website: [email or web address]. By joining [facility]’s FollowMyHealth portal, you will also be able to view your health information using other applications (apps) compatible with our system.

## 2024-05-24 DIAGNOSIS — E11.22 TYPE 2 DIABETES MELLITUS WITH DIABETIC CHRONIC KIDNEY DISEASE: ICD-10-CM

## 2024-05-24 DIAGNOSIS — Z91.81 HISTORY OF FALLING: ICD-10-CM

## 2024-05-24 DIAGNOSIS — E78.00 PURE HYPERCHOLESTEROLEMIA, UNSPECIFIED: ICD-10-CM

## 2024-05-24 DIAGNOSIS — Z79.899 OTHER LONG TERM (CURRENT) DRUG THERAPY: ICD-10-CM

## 2024-05-24 DIAGNOSIS — D69.6 THROMBOCYTOPENIA, UNSPECIFIED: ICD-10-CM

## 2024-05-24 DIAGNOSIS — N18.6 END STAGE RENAL DISEASE: ICD-10-CM

## 2024-05-24 DIAGNOSIS — I25.10 ATHEROSCLEROTIC HEART DISEASE OF NATIVE CORONARY ARTERY WITHOUT ANGINA PECTORIS: ICD-10-CM

## 2024-05-24 DIAGNOSIS — E11.65 TYPE 2 DIABETES MELLITUS WITH HYPERGLYCEMIA: ICD-10-CM

## 2024-05-24 DIAGNOSIS — Z79.02 LONG TERM (CURRENT) USE OF ANTITHROMBOTICS/ANTIPLATELETS: ICD-10-CM

## 2024-05-24 DIAGNOSIS — Z79.4 LONG TERM (CURRENT) USE OF INSULIN: ICD-10-CM

## 2024-05-24 DIAGNOSIS — Z95.5 PRESENCE OF CORONARY ANGIOPLASTY IMPLANT AND GRAFT: ICD-10-CM

## 2024-05-24 DIAGNOSIS — M10.9 GOUT, UNSPECIFIED: ICD-10-CM

## 2024-05-24 DIAGNOSIS — M47.812 SPONDYLOSIS WITHOUT MYELOPATHY OR RADICULOPATHY, CERVICAL REGION: ICD-10-CM

## 2024-05-24 DIAGNOSIS — E87.5 HYPERKALEMIA: ICD-10-CM

## 2024-05-24 DIAGNOSIS — M25.511 PAIN IN RIGHT SHOULDER: ICD-10-CM

## 2024-05-24 DIAGNOSIS — R20.0 ANESTHESIA OF SKIN: ICD-10-CM

## 2024-05-24 DIAGNOSIS — I12.0 HYPERTENSIVE CHRONIC KIDNEY DISEASE WITH STAGE 5 CHRONIC KIDNEY DISEASE OR END STAGE RENAL DISEASE: ICD-10-CM

## 2024-05-24 DIAGNOSIS — Z99.2 DEPENDENCE ON RENAL DIALYSIS: ICD-10-CM

## 2024-05-24 DIAGNOSIS — Z79.82 LONG TERM (CURRENT) USE OF ASPIRIN: ICD-10-CM
